# Patient Record
Sex: MALE | Race: WHITE | NOT HISPANIC OR LATINO | Employment: OTHER | URBAN - METROPOLITAN AREA
[De-identification: names, ages, dates, MRNs, and addresses within clinical notes are randomized per-mention and may not be internally consistent; named-entity substitution may affect disease eponyms.]

---

## 2019-12-30 ENCOUNTER — RECORDS - HEALTHEAST (OUTPATIENT)
Dept: LAB | Facility: CLINIC | Age: 84
End: 2019-12-30

## 2019-12-30 LAB
ALBUMIN SERPL-MCNC: 3 G/DL (ref 3.5–5)
ANION GAP SERPL CALCULATED.3IONS-SCNC: 6 MMOL/L (ref 5–18)
BUN SERPL-MCNC: 64 MG/DL (ref 8–28)
CALCIUM SERPL-MCNC: 9.3 MG/DL (ref 8.5–10.5)
CHLORIDE BLD-SCNC: 110 MMOL/L (ref 98–107)
CO2 SERPL-SCNC: 28 MMOL/L (ref 22–31)
CREAT SERPL-MCNC: 1.61 MG/DL (ref 0.7–1.3)
ERYTHROCYTE [DISTWIDTH] IN BLOOD BY AUTOMATED COUNT: 13.6 % (ref 11–14.5)
GFR SERPL CREATININE-BSD FRML MDRD: 40 ML/MIN/1.73M2
GLUCOSE BLD-MCNC: 78 MG/DL (ref 70–125)
HCT VFR BLD AUTO: 29.7 % (ref 40–54)
HGB BLD-MCNC: 9.1 G/DL (ref 14–18)
MCH RBC QN AUTO: 30.7 PG (ref 27–34)
MCHC RBC AUTO-ENTMCNC: 30.6 G/DL (ref 32–36)
MCV RBC AUTO: 100 FL (ref 80–100)
PLATELET # BLD AUTO: 192 THOU/UL (ref 140–440)
PMV BLD AUTO: 11.7 FL (ref 8.5–12.5)
POTASSIUM BLD-SCNC: 4.6 MMOL/L (ref 3.5–5)
RBC # BLD AUTO: 2.96 MILL/UL (ref 4.4–6.2)
SODIUM SERPL-SCNC: 144 MMOL/L (ref 136–145)
WBC: 6.7 THOU/UL (ref 4–11)

## 2020-01-06 ENCOUNTER — RECORDS - HEALTHEAST (OUTPATIENT)
Dept: LAB | Facility: CLINIC | Age: 85
End: 2020-01-06

## 2020-01-06 LAB
ANION GAP SERPL CALCULATED.3IONS-SCNC: 7 MMOL/L (ref 5–18)
BUN SERPL-MCNC: 62 MG/DL (ref 8–28)
CALCIUM SERPL-MCNC: 8.7 MG/DL (ref 8.5–10.5)
CHLORIDE BLD-SCNC: 109 MMOL/L (ref 98–107)
CO2 SERPL-SCNC: 25 MMOL/L (ref 22–31)
CREAT SERPL-MCNC: 1.56 MG/DL (ref 0.7–1.3)
GFR SERPL CREATININE-BSD FRML MDRD: 42 ML/MIN/1.73M2
GLUCOSE BLD-MCNC: 77 MG/DL (ref 70–125)
HGB BLD-MCNC: 9.5 G/DL (ref 14–18)
POTASSIUM BLD-SCNC: 4.5 MMOL/L (ref 3.5–5)
SODIUM SERPL-SCNC: 141 MMOL/L (ref 136–145)

## 2020-01-10 ENCOUNTER — RECORDS - HEALTHEAST (OUTPATIENT)
Dept: LAB | Facility: CLINIC | Age: 85
End: 2020-01-10

## 2020-01-13 LAB
ALBUMIN SERPL-MCNC: 3.5 G/DL (ref 3.5–5)
ANION GAP SERPL CALCULATED.3IONS-SCNC: 11 MMOL/L (ref 5–18)
BUN SERPL-MCNC: 66 MG/DL (ref 8–28)
CALCIUM SERPL-MCNC: 9.5 MG/DL (ref 8.5–10.5)
CHLORIDE BLD-SCNC: 109 MMOL/L (ref 98–107)
CO2 SERPL-SCNC: 25 MMOL/L (ref 22–31)
CREAT SERPL-MCNC: 1.71 MG/DL (ref 0.7–1.3)
ERYTHROCYTE [DISTWIDTH] IN BLOOD BY AUTOMATED COUNT: 14.2 % (ref 11–14.5)
GFR SERPL CREATININE-BSD FRML MDRD: 38 ML/MIN/1.73M2
GLUCOSE BLD-MCNC: 139 MG/DL (ref 70–125)
HCT VFR BLD AUTO: 34 % (ref 40–54)
HGB BLD-MCNC: 10.2 G/DL (ref 14–18)
MCH RBC QN AUTO: 30.5 PG (ref 27–34)
MCHC RBC AUTO-ENTMCNC: 30 G/DL (ref 32–36)
MCV RBC AUTO: 102 FL (ref 80–100)
PLATELET # BLD AUTO: 171 THOU/UL (ref 140–440)
PMV BLD AUTO: 11.7 FL (ref 8.5–12.5)
POTASSIUM BLD-SCNC: 4.5 MMOL/L (ref 3.5–5)
RBC # BLD AUTO: 3.34 MILL/UL (ref 4.4–6.2)
SODIUM SERPL-SCNC: 145 MMOL/L (ref 136–145)
WBC: 6.5 THOU/UL (ref 4–11)

## 2020-01-16 ENCOUNTER — RECORDS - HEALTHEAST (OUTPATIENT)
Dept: LAB | Facility: CLINIC | Age: 85
End: 2020-01-16

## 2020-01-16 LAB
FOLATE SERPL-MCNC: >20 NG/ML
TSH SERPL DL<=0.005 MIU/L-ACNC: 2.56 UIU/ML (ref 0.3–5)
VIT B12 SERPL-MCNC: 1043 PG/ML (ref 213–816)

## 2020-02-03 PROBLEM — E53.8 B12 DEFICIENCY: Status: ACTIVE | Noted: 2017-10-18

## 2020-02-03 PROBLEM — I25.10 CORONARY ATHEROSCLEROSIS: Status: ACTIVE | Noted: 2018-07-24

## 2020-02-03 NOTE — PROGRESS NOTES
[facility]  PRIMARY CARE PROVIDER AND CLINIC:  Trinidad Cobian, SYBIL CNP, [address] / [place] [address]  Chief Complaint   Patient presents with     Establish Care     [facility] Medical Record Number:  [id number]  Place of Service where encounter took place:  [facility] ([facility]) [[id number]]    HPI:    HPI information obtained from: facility chart records, facility staff, patient report and [facility] chart review.     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 He was previously living at home with his spouse where he had multiple falls, increased weakness and anxiety. His spouse had to call 911 to help him up and he was taken to [facility] ER for further evaluation. Work up was unremarkable and he was started on mirtazapine for anxietyattacks which he was previously on lorazepam. [facility] hospitalization 12/17-12/20/19. He then went to [facility] from 12/20-1/22/20. Mirtazapine was increased to 30 mg daily and anxiety noted to have improved. At discharge, he was ambulating 300 feet with FWW but had a fear of falling. Due to increased care needs for him and his spouse, it was recommended that they move to assisted living thus moved to the [facility].        Mr. Salvador was visited today while in his apartment, sitting in the wheelchair eating lunch. Wife is present of the visit as well, she asked that he be visited after he has eaten his lunch. Returned thirty minutes later and he was finishing up lunch but did not appear to eat much. He denies pain. States that he does not wish to go to the dining room for meals due to not wanting to ambulate or use the wheelchair. He has not walked with a walker  "for 2 weeks due to fear of falling. He becomes anxious and lower extremities become tremulous/shaky and he \"panics\" that lasts several seconds. This has happened on six different occasions since moving to the facility. He declined PT/OT services. The rubber door threads seem to trigger the attacks as the wheels on the chair have to go over the tread and he fears falling out head first. He is a \"slow\" eater, has not been eating well and has lost 12 pounds over the past several weeks, but gained some back while at the TCU. Sleeping well. Having regular bowel movements. Does not feel that he is completely emptying his bladder but states per past provider \"has a prostate of a 30 year old.\"    Spoke with daughter today who reports that she feels her dad is depressed. Described that the shades are drawn today and he appears highly anxious. Both of her parents generally kept to themselves and always had the shades drawn at their previous home which her dad has told her because the light bothers his eyes. She is concerned that her parents may not be able to live together in the future as they are irritable toward one another. She would like to see her dad use the walker as he was before. She too fears that he will fall out of his wheelchair during a panic episode. Spoke about the benefit of PT/OT which both of her parents have declined.      Functional Status at TCU Discharge  Mobility: Ambulating with a front-wheeled walker with minimal assistance up to 300 feet. Independent with transfers.   Balance: Tinetti Balance Assessment 18/28 (24-28 = low fall risk; 19-23 = medium fall risk; 0-18 = high fall risk)  ADLs: Upper Extremity Dressing: independent  Lower Extremity Dressing: independent  Toileting: independent  Hygiene and Grooming: moderate assistance with bathing  Kitchen: moderate assistance with meal preparation   Cognition: [employer] Mental Status ([facility]) 25/30 (27-30 within normal limits)    CODE " STATUS/ADVANCE DIRECTIVES DISCUSSION:   DNR / DNI  Patient's living condition: lives with spouse  ALLERGIES: Patient has no allergy information on record.  PAST MEDICAL HISTORY:  has a past medical history of CAD (coronary artery disease), Cataract, HTN (hypertension), and Nocturia.  PAST SURGICAL HISTORY:   has a past surgical history that includes cataract iol, rt/lt.  FAMILY HISTORY: family history includes Cataracts in his father.  SOCIAL HISTORY:   reports that he has never smoked. He has never used smokeless tobacco. He reports previous alcohol use.    Post Discharge Medication Reconciliation Status: discharge medications reconciled and changed, per note/orders (see AVS)    Current Outpatient Medications   Medication Sig Dispense Refill     allopurinol (ZYLOPRIM) 100 MG tablet Take 1 tablet (100 mg) by mouth daily       aspirin (ASA) 81 MG EC tablet Take 1 tablet (81 mg) by mouth daily       atorvastatin (LIPITOR) 40 MG tablet Take 1 tablet (40 mg) by mouth daily       citalopram (CELEXA) 10 MG tablet Take 1 tablet (10 mg) by mouth daily       cyanocobalamin (VITAMIN B-12) 500 MCG tablet Take 1 tablet (500 mcg) by mouth daily       losartan (COZAAR) 25 MG tablet Take 1 tablet (25 mg) by mouth daily       mirtazapine (REMERON) 15 MG tablet Take 1 tablet (15 mg) by mouth At Bedtime       nitroGLYcerin (NITROSTAT) 0.4 MG sublingual tablet For chest pain place 1 tablet under the tongue every 5 minutes for 3 doses. If symptoms persist 5 minutes after 1st dose call 911.       polyethylene glycol (MIRALAX/GLYCOLAX) packet Take 8.5 g by mouth daily as needed for constipation       triamcinolone (KENALOG) 0.1 % external ointment Apply topically daily as needed for irritation       vitamin D3 (CHOLECALCIFEROL) 2000 units (50 mcg) tablet Take 1 tablet (2,000 Units) by mouth daily       ROS:  10 point ROS of systems including Constitutional, Eyes, Respiratory, Cardiovascular, Gastroenterology, Genitourinary,  Integumentary, Musculoskeletal, Psychiatric were all negative except for pertinent positives noted in my HPI.    Vitals:  There were no vitals taken for this visit.  Exam:  GENERAL APPEARANCE:  Alert, in no distress, pleasant, cooperative, oriented x 4  EYES: no discharge or mattering on lids or lashes noted  ENT:  moist mucous membranes, hearing acuity intact  NECK: supple, symmetrical  RESP: no respiratory distress, Lung sounds clear, patient is on room air  CV:  rate and rhythm regular, no murmur. Edema none in bilateral lower extremities. VASCULAR: warm extremities without open areas.  ABDOMEN: normal bowel sounds, soft, nontender.  M/S:   Gait and station: wheelchair bound due to fear of falling, no tenderness or swelling of the joints; able to move all extremities   SKIN:  Inspection and palpation of skin and subcutaneous tissue: skin warm, dry and intact without rashes  NEURO: no facial asymmetry, no speech deficits and able to follow directions, moves all extremities symmetrically  PSYCH:  insight and judgement intact, memory intact, affect and mood flat    Lab/Diagnostic data:  Reviewed in care everywhere    ASSESSMENT/PLAN:  Uncontrolled Anxiety  Panic attacks  Due to fear of falling. Was started on mirtazapine during hospitalization which was increased to 30 mg at TCU and per the discharge summary was helpful. Since moving to the assisted living he has had 6 episodes of panic attack all while in the wheelchair and related to the fear of falling out of the wheelchair. The attacks are described as the lower legs starting to shake for several seconds. He was using a walker and ambulating up to 300 feet but he has used the wheelchair since three days prior to TCU discharge due to fear. Was previously using lorazepam which will be avoided as that likely contributed to the initial falls.   --will decrease mirtazapine to 15 mg at HS to get more sedative effect to target anxiety. If still anxious in 1-2 weeks,  will decrease dose further to 7.5 mg.   --start citalopram 10 mg daily and will increase up to 20 mg per day as tolerated.     Depression  Very flat affect. Shades drawn in the apartment but after chart review of him and spouse, there were SW visits to their previous home, the house was dark with all the shades drawn as well. He appears slightly paranoid today, doesn't want to leave apartment to meet other residents but this may also be due to uncontrolled anxiety. Hopeful that this will improve with the medication changes as noted above.   --citalopram and mirtazapine as noted above.     Chronic ischemic heart disease  Hypertension  Hyperlipidema  EF of 45%, stent placed in 1994. Previously on amlodipine but was stopped due to pedal edema and he was started on losartan this past summer 2019 which he appears to have tolerated well. Blood pressure is controlled today at 124/78 and HR 83.   --continue with losartan 25 mg daily  --asa 81 mg daily  --atorvastatin 40 mg daily  --Continue to monitor blood pressure and heart rate, adjust medications as needed.     CKD Stage 3  Baseline creatinine of 1.6-1.8. per chart review, he saw a nephrologist in 2017 but doesn't appear to have returned for the recommended 6 month follow up.   --Avoid nephrotoxic medications. Recheck BMP on next lab day.     Anemia  May be due to poor food intake. Baseline HGB appears to be around 10.  --continue with B12 supplement daily  --recheck HGB on next lab day    Weight loss  Was 125 lb which decreased to 113 while at the TCU then increased back to 118-120 lbs.   --encourage all meals and snacks.   --could see weight increase with mirtazapine.     Asthma  No wheezing on exam. He does not use an inhaler.     Gout  No s/sx of flare  --continue with allopurinol 100 mg daily    Gallstones  Hx of gallstone pancreatitis. No complaints of abdominal pain today.   --monitor for now. If develops abdominal pain, may need to have surgical intervention for  gallstones.     Total time spent with patient visit at the [facility] nursing facility was 65 minutes including patient visit, review of past records and phone call to patient contact. Greater than 50% of total time spent with counseling and coordinating care due to panic attacks, anxiety, depression, CKD III, need for follow up bloodwork.     Electronically signed by:  SYBIL Milton CNP

## 2020-02-04 ENCOUNTER — ASSISTED LIVING VISIT (OUTPATIENT)
Dept: GERIATRICS | Facility: CLINIC | Age: 85
End: 2020-02-04
Payer: MEDICARE

## 2020-02-04 DIAGNOSIS — I25.9 CHRONIC ISCHEMIC HEART DISEASE: ICD-10-CM

## 2020-02-04 DIAGNOSIS — J45.20 MILD INTERMITTENT ASTHMA WITHOUT COMPLICATION: ICD-10-CM

## 2020-02-04 DIAGNOSIS — N18.30 ANEMIA DUE TO STAGE 3 CHRONIC KIDNEY DISEASE (H): ICD-10-CM

## 2020-02-04 DIAGNOSIS — M10.9 GOUT, UNSPECIFIED CAUSE, UNSPECIFIED CHRONICITY, UNSPECIFIED SITE: ICD-10-CM

## 2020-02-04 DIAGNOSIS — R63.4 WEIGHT LOSS: ICD-10-CM

## 2020-02-04 DIAGNOSIS — F41.1 GENERALIZED ANXIETY DISORDER: ICD-10-CM

## 2020-02-04 DIAGNOSIS — I10 ESSENTIAL HYPERTENSION: ICD-10-CM

## 2020-02-04 DIAGNOSIS — F41.0 PANIC ATTACK: ICD-10-CM

## 2020-02-04 DIAGNOSIS — Z76.89 ENCOUNTER TO ESTABLISH CARE: Primary | ICD-10-CM

## 2020-02-04 DIAGNOSIS — K80.20 GALLSTONES: ICD-10-CM

## 2020-02-04 DIAGNOSIS — N18.30 CKD (CHRONIC KIDNEY DISEASE) STAGE 3, GFR 30-59 ML/MIN (H): ICD-10-CM

## 2020-02-04 DIAGNOSIS — E78.2 MIXED HYPERLIPIDEMIA: ICD-10-CM

## 2020-02-04 DIAGNOSIS — F33.1 MODERATE EPISODE OF RECURRENT MAJOR DEPRESSIVE DISORDER (H): ICD-10-CM

## 2020-02-04 DIAGNOSIS — D63.1 ANEMIA DUE TO STAGE 3 CHRONIC KIDNEY DISEASE (H): ICD-10-CM

## 2020-02-04 PROCEDURE — 99207 ZZC CDG-CODE INCORRECT PER BILLING BASED ON TIME: CPT | Performed by: NURSE PRACTITIONER

## 2020-02-04 NOTE — LETTER
2/4/2020        RE: Ben Salvador  [facility]  [address] [address] [place] [address]        [facility]  PRIMARY CARE PROVIDER AND CLINIC:  SYBIL Milton CNP, [address] [place] [address] / [place] [address]  Chief Complaint   Patient presents with     Establish Care     [facility] Medical Record Number:  [id number]  Place of Service where encounter took place:  [facility] ([facility]) [[id number]]    HPI:    HPI information obtained from: facility chart records, facility staff, patient report and [facility] chart review.     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 He was previously living at home with his spouse where he had multiple falls, increased weakness and anxiety. His spouse had to call 911 to help him up and he was taken to [facility] for further evaluation. Work up was unremarkable and he was started on mirtazapine for anxietyattacks which he was previously on lorazepam. [facility] hospitalization 12/17-12/20/19. He then went to [facility] from 12/20-1/22/20. Mirtazapine was increased to 30 mg daily and anxiety noted to have improved. At discharge, he was ambulating 300 feet with FWW but had a fear of falling. Due to increased care needs for him and his spouse, it was recommended that they move to assisted living thus moved to the [facility].        Mr. Salvador was visited today while in his apartment, sitting in the wheelchair eating lunch. Wife is present of the visit as well, she asked that he be visited after he has eaten his lunch. Returned thirty minutes later and he was finishing up lunch but did not appear to eat much. He denies pain. States that he does not wish to go to the  "dining room for meals due to not wanting to ambulate or use the wheelchair. He has not walked with a walker for 2 weeks due to fear of falling. He becomes anxious and lower extremities become tremulous/shaky and he \"panics\" that lasts several seconds. This has happened on six different occasions since moving to the facility. He declined PT/OT services. The rubber door threads seem to trigger the attacks as the wheels on the chair have to go over the tread and he fears falling out head first. He is a \"slow\" eater, has not been eating well and has lost 12 pounds over the past several weeks, but gained some back while at the TCU. Sleeping well. Having regular bowel movements. Does not feel that he is completely emptying his bladder but states per past provider \"has a prostate of a 30 year old.\"    Spoke with daughter today who reports that she feels her dad is depressed. Described that the shades are drawn today and he appears highly anxious. Both of her parents generally kept to themselves and always had the shades drawn at their previous home which her dad has told her because the light bothers his eyes. She is concerned that her parents may not be able to live together in the future as they are irritable toward one another. She would like to see her dad use the walker as he was before. She too fears that he will fall out of his wheelchair during a panic episode. Spoke about the benefit of PT/OT which both of her parents have declined.      Functional Status at TCU Discharge  Mobility: Ambulating with a front-wheeled walker with minimal assistance up to 300 feet. Independent with transfers.   Balance: Tinetti Balance Assessment 18/28 (24-28 = low fall risk; 19-23 = medium fall risk; 0-18 = high fall risk)  ADLs: Upper Extremity Dressing: independent  Lower Extremity Dressing: independent  Toileting: independent  Hygiene and Grooming: moderate assistance with bathing  Kitchen: moderate assistance with meal preparation "   Cognition: [employer] Mental Status ([facility]) 25/30 (27-30 within normal limits)    CODE STATUS/ADVANCE DIRECTIVES DISCUSSION:   DNR / DNI  Patient's living condition: lives with spouse  ALLERGIES: Patient has no allergy information on record.  PAST MEDICAL HISTORY:  has a past medical history of CAD (coronary artery disease), Cataract, HTN (hypertension), and Nocturia.  PAST SURGICAL HISTORY:   has a past surgical history that includes cataract iol, rt/lt.  FAMILY HISTORY: family history includes Cataracts in his father.  SOCIAL HISTORY:   reports that he has never smoked. He has never used smokeless tobacco. He reports previous alcohol use.    Post Discharge Medication Reconciliation Status: discharge medications reconciled and changed, per note/orders (see AVS)    Current Outpatient Medications   Medication Sig Dispense Refill     allopurinol (ZYLOPRIM) 100 MG tablet Take 1 tablet (100 mg) by mouth daily       aspirin (ASA) 81 MG EC tablet Take 1 tablet (81 mg) by mouth daily       atorvastatin (LIPITOR) 40 MG tablet Take 1 tablet (40 mg) by mouth daily       citalopram (CELEXA) 10 MG tablet Take 1 tablet (10 mg) by mouth daily       cyanocobalamin (VITAMIN B-12) 500 MCG tablet Take 1 tablet (500 mcg) by mouth daily       losartan (COZAAR) 25 MG tablet Take 1 tablet (25 mg) by mouth daily       mirtazapine (REMERON) 15 MG tablet Take 1 tablet (15 mg) by mouth At Bedtime       nitroGLYcerin (NITROSTAT) 0.4 MG sublingual tablet For chest pain place 1 tablet under the tongue every 5 minutes for 3 doses. If symptoms persist 5 minutes after 1st dose call 911.       polyethylene glycol (MIRALAX/GLYCOLAX) packet Take 8.5 g by mouth daily as needed for constipation       triamcinolone (KENALOG) 0.1 % external ointment Apply topically daily as needed for irritation       vitamin D3 (CHOLECALCIFEROL) 2000 units (50 mcg) tablet Take 1 tablet (2,000 Units) by mouth daily       ROS:  10 point ROS of systems  including Constitutional, Eyes, Respiratory, Cardiovascular, Gastroenterology, Genitourinary, Integumentary, Musculoskeletal, Psychiatric were all negative except for pertinent positives noted in my HPI.    Vitals:  There were no vitals taken for this visit.  Exam:  GENERAL APPEARANCE:  Alert, in no distress, pleasant, cooperative, oriented x 4  EYES: no discharge or mattering on lids or lashes noted  ENT:  moist mucous membranes, hearing acuity intact  NECK: supple, symmetrical  RESP: no respiratory distress, Lung sounds clear, patient is on room air  CV:  rate and rhythm regular, no murmur. Edema none in bilateral lower extremities. VASCULAR: warm extremities without open areas.  ABDOMEN: normal bowel sounds, soft, nontender.  M/S:   Gait and station: wheelchair bound due to fear of falling, no tenderness or swelling of the joints; able to move all extremities   SKIN:  Inspection and palpation of skin and subcutaneous tissue: skin warm, dry and intact without rashes  NEURO: no facial asymmetry, no speech deficits and able to follow directions, moves all extremities symmetrically  PSYCH:  insight and judgement intact, memory intact, affect and mood flat    Lab/Diagnostic data:  Reviewed in care everywhere    ASSESSMENT/PLAN:  Uncontrolled Anxiety  Panic attacks  Due to fear of falling. Was started on mirtazapine during hospitalization which was increased to 30 mg at TCU and per the discharge summary was helpful. Since moving to the assisted living he has had 6 episodes of panic attack all while in the wheelchair and related to the fear of falling out of the wheelchair. The attacks are described as the lower legs starting to shake for several seconds. He was using a walker and ambulating up to 300 feet but he has used the wheelchair since three days prior to TCU discharge due to fear. Was previously using lorazepam which will be avoided as that likely contributed to the initial falls.   --will decrease mirtazapine  to 15 mg at HS to get more sedative effect to target anxiety. If still anxious in 1-2 weeks, will decrease dose further to 7.5 mg.   --start citalopram 10 mg daily and will increase up to 20 mg per day as tolerated.     Depression  Very flat affect. Shades drawn in the apartment but after chart review of him and spouse, there were SW visits to their previous home, the house was dark with all the shades drawn as well. He appears slightly paranoid today, doesn't want to leave apartment to meet other residents but this may also be due to uncontrolled anxiety. Hopeful that this will improve with the medication changes as noted above.   --citalopram and mirtazapine as noted above.     Chronic ischemic heart disease  Hypertension  Hyperlipidema  EF of 45%, stent placed in 1994. Previously on amlodipine but was stopped due to pedal edema and he was started on losartan this past summer 2019 which he appears to have tolerated well. Blood pressure is controlled today at 124/78 and HR 83.   --continue with losartan 25 mg daily  --asa 81 mg daily  --atorvastatin 40 mg daily  --Continue to monitor blood pressure and heart rate, adjust medications as needed.     CKD Stage 3  Baseline creatinine of 1.6-1.8. per chart review, he saw a nephrologist in 2017 but doesn't appear to have returned for the recommended 6 month follow up.   --Avoid nephrotoxic medications. Recheck BMP on next lab day.     Anemia  May be due to poor food intake. Baseline HGB appears to be around 10.  --continue with B12 supplement daily  --recheck HGB on next lab day    Weight loss  Was 125 lb which decreased to 113 while at the TCU then increased back to 118-120 lbs.   --encourage all meals and snacks.   --could see weight increase with mirtazapine.     Asthma  No wheezing on exam. He does not use an inhaler.     Gout  No s/sx of flare  --continue with allopurinol 100 mg daily    Gallstones  Hx of gallstone pancreatitis. No complaints of abdominal pain  today.   --monitor for now. If develops abdominal pain, may need to have surgical intervention for gallstones.     Total time spent with patient visit at the skilled nursing facility was 65 minutes including patient visit, review of past records and phone call to patient contact. Greater than 50% of total time spent with counseling and coordinating care due to panic attacks, anxiety, depression, CKD III, need for follow up bloodwork.     Electronically signed by:  SYBIL Milton CNP                       Sincerely,        SYBIL Milton CNP

## 2020-02-06 RX ORDER — ATORVASTATIN CALCIUM 40 MG/1
40 TABLET, FILM COATED ORAL DAILY
COMMUNITY
Start: 2020-02-05 | End: 2020-02-16

## 2020-02-06 RX ORDER — LOSARTAN POTASSIUM 25 MG/1
25 TABLET ORAL DAILY
COMMUNITY
Start: 2020-02-05 | End: 2020-02-16

## 2020-02-06 RX ORDER — ALLOPURINOL 100 MG/1
100 TABLET ORAL DAILY
COMMUNITY
Start: 2020-02-05 | End: 2020-02-16

## 2020-02-06 RX ORDER — UREA 10 %
500 LOTION (ML) TOPICAL DAILY
COMMUNITY
Start: 2020-02-05 | End: 2020-02-16

## 2020-02-06 RX ORDER — CHOLECALCIFEROL (VITAMIN D3) 50 MCG
1 TABLET ORAL DAILY
COMMUNITY
Start: 2020-02-05 | End: 2020-03-03

## 2020-02-06 RX ORDER — NITROGLYCERIN 0.4 MG/1
TABLET SUBLINGUAL
COMMUNITY
Start: 2020-02-05 | End: 2021-01-28

## 2020-02-06 RX ORDER — CITALOPRAM HYDROBROMIDE 10 MG/1
10 TABLET ORAL DAILY
COMMUNITY
Start: 2020-02-05 | End: 2020-02-16

## 2020-02-06 RX ORDER — POLYETHYLENE GLYCOL 3350 17 G/17G
8.5 POWDER, FOR SOLUTION ORAL DAILY PRN
COMMUNITY
Start: 2020-02-05 | End: 2021-09-23

## 2020-02-06 RX ORDER — TRIAMCINOLONE ACETONIDE 1 MG/G
OINTMENT TOPICAL DAILY PRN
COMMUNITY
Start: 2020-02-05 | End: 2021-01-08

## 2020-02-06 RX ORDER — MIRTAZAPINE 15 MG/1
15 TABLET, FILM COATED ORAL AT BEDTIME
COMMUNITY
Start: 2020-02-05 | End: 2020-02-16

## 2020-02-14 DIAGNOSIS — I10 ESSENTIAL HYPERTENSION: ICD-10-CM

## 2020-02-14 DIAGNOSIS — R63.4 WEIGHT LOSS: ICD-10-CM

## 2020-02-14 DIAGNOSIS — F41.1 GENERALIZED ANXIETY DISORDER: Primary | ICD-10-CM

## 2020-02-14 DIAGNOSIS — M10.9 GOUT, UNSPECIFIED CAUSE, UNSPECIFIED CHRONICITY, UNSPECIFIED SITE: ICD-10-CM

## 2020-02-16 RX ORDER — ALLOPURINOL 100 MG/1
TABLET ORAL
Qty: 30 TABLET | Status: SHIPPED | OUTPATIENT
Start: 2020-02-16 | End: 2021-03-10

## 2020-02-16 RX ORDER — ATORVASTATIN CALCIUM 40 MG/1
TABLET, FILM COATED ORAL
Qty: 30 TABLET | Status: SHIPPED | OUTPATIENT
Start: 2020-02-16 | End: 2021-03-10

## 2020-02-16 RX ORDER — LOSARTAN POTASSIUM 25 MG/1
TABLET ORAL
Qty: 30 TABLET | Status: SHIPPED | OUTPATIENT
Start: 2020-02-16 | End: 2020-10-21

## 2020-02-16 RX ORDER — CITALOPRAM HYDROBROMIDE 10 MG/1
TABLET ORAL
Qty: 30 TABLET | Status: SHIPPED | OUTPATIENT
Start: 2020-02-16 | End: 2020-03-14

## 2020-02-16 RX ORDER — MIRTAZAPINE 15 MG/1
TABLET, FILM COATED ORAL
Qty: 30 TABLET | Status: SHIPPED | OUTPATIENT
Start: 2020-02-16 | End: 2020-04-02

## 2020-02-16 RX ORDER — MULTIVIT-MIN/FA/LYCOPEN/LUTEIN .4-300-25
TABLET ORAL
Qty: 30 TABLET | Status: SHIPPED | OUTPATIENT
Start: 2020-02-16 | End: 2021-03-10

## 2020-02-16 RX ORDER — CHOLECALCIFEROL (VITAMIN D3) 25 MCG
TABLET ORAL
Qty: 30 TABLET | Status: SHIPPED | OUTPATIENT
Start: 2020-02-16 | End: 2021-03-10

## 2020-02-16 RX ORDER — ASPIRIN 81 MG
TABLET,CHEWABLE ORAL
Qty: 30 TABLET | Status: SHIPPED | OUTPATIENT
Start: 2020-02-16 | End: 2021-03-10

## 2020-02-16 RX ORDER — UREA 10 %
LOTION (ML) TOPICAL
Qty: 30 TABLET | Status: SHIPPED | OUTPATIENT
Start: 2020-02-16 | End: 2021-02-05

## 2020-02-18 ENCOUNTER — ASSISTED LIVING VISIT (OUTPATIENT)
Dept: GERIATRICS | Facility: CLINIC | Age: 85
End: 2020-02-18
Payer: MEDICARE

## 2020-02-18 DIAGNOSIS — D51.9 ANEMIA DUE TO VITAMIN B12 DEFICIENCY, UNSPECIFIED B12 DEFICIENCY TYPE: ICD-10-CM

## 2020-02-18 DIAGNOSIS — M62.81 GENERALIZED MUSCLE WEAKNESS: ICD-10-CM

## 2020-02-18 DIAGNOSIS — R63.4 WEIGHT LOSS: ICD-10-CM

## 2020-02-18 DIAGNOSIS — I10 ESSENTIAL HYPERTENSION: ICD-10-CM

## 2020-02-18 DIAGNOSIS — I25.9 CHRONIC ISCHEMIC HEART DISEASE: ICD-10-CM

## 2020-02-18 DIAGNOSIS — R29.6 FALLS FREQUENTLY: ICD-10-CM

## 2020-02-18 DIAGNOSIS — N18.30 CKD (CHRONIC KIDNEY DISEASE) STAGE 3, GFR 30-59 ML/MIN (H): ICD-10-CM

## 2020-02-18 DIAGNOSIS — F33.1 MODERATE EPISODE OF RECURRENT MAJOR DEPRESSIVE DISORDER (H): ICD-10-CM

## 2020-02-18 DIAGNOSIS — F41.1 GENERALIZED ANXIETY DISORDER: Primary | ICD-10-CM

## 2020-02-18 DIAGNOSIS — F41.0 PANIC ATTACK: ICD-10-CM

## 2020-02-18 NOTE — LETTER
"    2/18/2020        RE: Ben Salvador  [facility]  [address]  [facility] [address]        Ben Salvador is a 89 year old male seen February 18, 2020 at [facility] where he was admitted this month from home.   Pt is seen in his apartment, up to  with his wife present.    His biggest concern is \"I still have anxiety and panic attacks\"  States during evening cares this week the MARIA GUADALUPE was rushing him while despite his attempts to slow the pace, and he developed uncontrolled shaking of his feet and lower extremities that lasted about 20 seconds. This is typical for his panic attacks.      By chart review, patient had a [facility] admission in December 2019 for recurrent falls and worsened anxiety.   He had been taking lorazepam at home for the anxiety, worsening the falls, and this was tapered off.  He transitioned to [facility] where his anxiety, panic attacks and fear of falling impaired his ability to progress with therapies.    Mirtazapine started and he was followed by the Psychologist.  By the end of his month-long stay he was able to ambulate 300' with FWW and min assist, independent with transfers.   Since AL admission he has not tried walking and has declined [facility] PHYSICAL THERAPY / OCCUPATIONAL THERAPY, retains disabling fear of falling.             Pt has been primary caregiver for his wife aMriela, but not eating well, losing weight and getting weaker.   After hospitalization their family determined they need more support and have moved them to [place] for permanent placement.       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  They have a son Torin and daughter Dixie.     Non " smoker    ROS:  Tinetti 18/28    SLUMS 25/30  Continued poor appetite and weight loss; weight 113-120 lbs.    Keeps shades drawn, does not go out of apartment.     EXAM: up to WC, NAD  BP (!) 144/78   Pulse 77   Temp 96.8  F (36  C)   SpO2 98%    Neck supple without adenopathy  Lungs clear bilaterally with fair air movement  Heart RRR s1s2  Abd soft, NT, no distention, +BS  Ext without edema  Neuro: accurate historian, LE weakness  Psych: affect and content depressed    LAB 1/7-1/16/20:  TSH 2.56  B12 1043   Folate >20  Albumin 3.5  hgb 10.2  , plts 171  Na 145, K 4.5  CO2 25 glucose 77 BUN/Cr 62/1.56  GFR 42      IMP/PLAN:   (F41.1) Generalized anxiety disorder   (F41.0) Panic attack  (F33.1) Moderate episode of recurrent major depressive disorder (H)  Comment: medications adjusted just earlier this week, so will not change again today.   Plan: mirtazapine 15 mg/HS and citalopram 10 mg/day     He has made attempts to control his environment and prevent panic, and he will talk with DON about MARIA GUADALUPE concerns    (I10) Essential hypertension  (N18.3) CKD (chronic kidney disease) stage 3, GFR 30-59 ml/min (H)  Comment: GFR 42, bp control is okay.     Plan: losartan 25 mg/day, follow bps and BMP       (I25.9) Chronic ischemic heart disease  Comment: h/o MI, last EF 45% with inferior and inferolateral hypokinesis  Plan: remains on daily ASA and statin for secondary prevention.        (R63.4) Weight loss  Comment: poor appetite  Plan: some improvement in AL, continue mirtazapine with hopes to boost appetite.   Would be helpful if he and Mariela would go to DR for meals, but they have declined to date.       (R29.6) Falls frequently  (M62.81) Generalized muscle weakness  Comment: currently only using WC and needs assist for all ADLs     Plan: talked with patient about reconsidering [facility] PHYSICAL THERAPY   He is still not interested.      Continue vit D, dietary calcium       (D51.9) Anemia due to vitamin B12  deficiency, unspecified B12 deficiency type  Comment: macrocytic anemia   Plan: continue B12 replacement        Yessenia Coffman MD       Sincerely,        Yessenia Coffman MD

## 2020-02-20 ENCOUNTER — DOCUMENTATION ONLY (OUTPATIENT)
Dept: OTHER | Facility: CLINIC | Age: 85
End: 2020-02-20

## 2020-02-22 VITALS
TEMPERATURE: 96.8 F | SYSTOLIC BLOOD PRESSURE: 144 MMHG | DIASTOLIC BLOOD PRESSURE: 78 MMHG | OXYGEN SATURATION: 98 % | HEART RATE: 77 BPM

## 2020-02-22 NOTE — PROGRESS NOTES
"Ben Salvador is a 89 year old male seen February 18, 2020 at [facility] where he was admitted this month from home.   Pt is seen in his apartment, up to  with his wife present.    His biggest concern is \"I still have anxiety and panic attacks\"  States during evening cares this week the MARIA GUADALUPE was rushing him while despite his attempts to slow the pace, and he developed uncontrolled shaking of his feet and lower extremities that lasted about 20 seconds. This is typical for his panic attacks.      By chart review, patient had a [facility] admission in December 2019 for recurrent falls and worsened anxiety.   He had been taking lorazepam at home for the anxiety, worsening the falls, and this was tapered off.  He transitioned to [facility] where his anxiety, panic attacks and fear of falling impaired his ability to progress with therapies.    Mirtazapine started and he was followed by the Psychologist.  By the end of his month-long stay he was able to ambulate 300' with FWW and min assist, independent with transfers.   Since AL admission he has not tried walking and has declined [facility] PHYSICAL THERAPY / OCCUPATIONAL THERAPY, retains disabling fear of falling.             Pt has been primary caregiver for his wife Mariela, but not eating well, losing weight and getting weaker.   After hospitalization their family determined they need more support and have moved them to AL for permanent placement.       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  They have a son Torin and daughter Dixie.     Non smoker    ROS:  Tinetti 18/28    SLUMS 25/30  Continued poor appetite and weight loss; weight 113-120 lbs.    Keeps " shades drawn, does not go out of apartment.     EXAM: up to WC, NAD  BP (!) 144/78   Pulse 77   Temp 96.8  F (36  C)   SpO2 98%    Neck supple without adenopathy  Lungs clear bilaterally with fair air movement  Heart RRR s1s2  Abd soft, NT, no distention, +BS  Ext without edema  Neuro: accurate historian, LE mor  Psych: affect and content depressed    LAB 1/7-1/16/20:  TSH 2.56  B12 1043   Folate >20  Albumin 3.5  hgb 10.2  , plts 171  Na 145, K 4.5  CO2 25 glucose 77 BUN/Cr 62/1.56  GFR 42      IMP/PLAN:   (F41.1) Generalized anxiety disorder   (F41.0) Panic attack  (F33.1) Moderate episode of recurrent major depressive disorder (H)  Comment: medications adjusted just earlier this week, so will not change again today.   Plan: mirtazapine 15 mg/HS and citalopram 10 mg/day     He has made attempts to control his environment and prevent panic, and he will talk with DON about MARIA GUADALUPE concerns    (I10) Essential hypertension  (N18.3) CKD (chronic kidney disease) stage 3, GFR 30-59 ml/min (H)  Comment: GFR 42, bp control is okay.     Plan: losartan 25 mg/day, follow bps and BMP       (I25.9) Chronic ischemic heart disease  Comment: h/o MI, last EF 45% with inferior and inferolateral hypokinesis  Plan: remains on daily ASA and statin for secondary prevention.        (R63.4) Weight loss  Comment: poor appetite  Plan: some improvement in AL, continue mirtazapine with hopes to boost appetite.   Would be helpful if he and Mariela would go to DR for meals, but they have declined to date.       (R29.6) Falls frequently  (M62.81) Generalized muscle weakness  Comment: currently only using WC and needs assist for all ADLs     Plan: talked with patient about reconsidering [facility] PHYSICAL THERAPY   He is still not interested.      Continue vit D, dietary calcium       (D51.9) Anemia due to vitamin B12 deficiency, unspecified B12 deficiency type  Comment: macrocytic anemia   Plan: continue B12 replacement        Yessenia Coffman  MD

## 2020-03-03 ENCOUNTER — ASSISTED LIVING VISIT (OUTPATIENT)
Dept: GERIATRICS | Facility: CLINIC | Age: 85
End: 2020-03-03
Payer: MEDICARE

## 2020-03-03 DIAGNOSIS — F41.1 GENERALIZED ANXIETY DISORDER: Primary | ICD-10-CM

## 2020-03-03 NOTE — LETTER
3/3/2020        RE: Ben Salvador  [facility]  [address] [facility] [address]        [place] [facility]  [place] Medical Record Number:  [id number]  Place of Service where encounter took place:  [facility] ([facility]) [[id number]]  Chief Complaint   Patient presents with     RECHECK       HPI:    Ben Salvador  is a 89 year old (1/14/1928), who is being seen today for an episodic care visit.  HPI information obtained from: facility chart records, facility staff and patient report     Mr. Salvador was visited today while in his room, sitting in the wheelchair. Reports that he has not had improvement in his anxiety. He is sleeping well at night. He has not been out of his room since he moved to the facility 6 weeks ago. He had loose stools 2 weeks ago for 2-3 days but that has since resolved. Currently receives all meals in his room.       Past Medical and Surgical History reviewed in Epic today.    MEDICATIONS:  Current Outpatient Medications   Medication Sig Dispense Refill     allopurinol (ZYLOPRIM) 100 MG tablet TAKE 1 TABLET BY MOUTH ONCE DAILY 30 tablet PRN     ASPIRIN LOW DOSE 81 MG chewable tablet CHEW AND SWALLOW ONE TABLET BY MOUTH ONCE DAILY 30 tablet PRN     atorvastatin (LIPITOR) 40 MG tablet TAKE 1 TABLET BY MOUTH ONCE DAILY 30 tablet PRN     citalopram (CELEXA) 10 MG tablet TAKE 1 TABLET BY MOUTH ONCE DAILY (DX: ANXIETY) 30 tablet PRN     cyanocobalamin (VITAMIN B-12) 500 MCG tablet TAKE 1 TABLET BY MOUTH ONCE DAILY 30 tablet PRN     losartan (COZAAR) 25 MG tablet TAKE 1 TABLET BY MOUTH ONCE DAILY 30 tablet PRN     mirtazapine (REMERON) 15 MG tablet TAKE 1 TABLET BY MOUTH AT BEDTIME (DX: ANXIETY) 30 tablet PRN     Multiple Vitamins-Minerals (CEROVITE SENIOR) TABS TAKE 1 TABLET BY MOUTH ONCE DAILY 30 tablet PRN     nitroGLYcerin (NITROSTAT) 0.4 MG sublingual tablet For chest pain place 1 tablet under the tongue every 5 minutes for 3 doses. If symptoms persist 5 minutes  after 1st dose call 911.       polyethylene glycol (MIRALAX/GLYCOLAX) packet Take 8.5 g by mouth daily as needed for constipation       triamcinolone (KENALOG) 0.1 % external ointment Apply topically daily as needed for irritation       VITAMIN D3 25 MCG (1000 UT) tablet TAKE TWO TABLETS (2000 UNITS) BY MOUTH ONCE DAILY NOTE DOSAGE/STRENGTH 30 tablet PRN     vitamin D3 (CHOLECALCIFEROL) 2000 units (50 mcg) tablet Take 1 tablet (2,000 Units) by mouth daily       REVIEW OF SYSTEMS:  4 point ROS including Respiratory, CV, GI and , other than that noted in the HPI,  is negative    Objective:  Exam:  GENERAL APPEARANCE:  Alert, in no distress, pleasant, cooperative, oriented x 4  EYES: no discharge or mattering on lids or lashes noted  ENT:  moist mucous membranes, hearing acuity intact  NECK: supple, symmetrical  RESP: no respiratory distress,  patient is on room air  CV:   Edema none in bilateral lower extremities.  M/S:   Gait and station: wheelchair bound due to fear of falling, no tenderness or swelling of the joints; able to move all extremities   NEURO: no facial asymmetry, no speech deficits and able to follow directions, moves all extremities symmetrically  PSYCH:  insight and judgement intact, memory intact, affect and mood flat/anxious    Labs:   Reviewed in care everywhere    ASSESSMENT/PLAN:  (F41.1) Generalized anxiety disorder  (primary encounter diagnosis)  Comment: does not feel his anxiety has improved although he appears more comfortable. He has not been out of his apartment since moving into the facility so there may be some underlying paranoia.   Plan: increase citalopram to 15 mg daily. Will increase to goal of 20 mg daily.   --continue with mirtazapine 15 mg at HS. Could consider decreasing to 7.5 mg at HS for a more sedative effect if needed.     Electronically signed by:  SYBIL Milton CNP               Sincerely,        SYBIL Milton CNP

## 2020-03-03 NOTE — PROGRESS NOTES
[place] [facility]  [place] Medical Record Number:  [id number]  Place of Service where encounter took place:  [facility] ([facility]) [[id number]]  Chief Complaint   Patient presents with     RECHECK       HPI:    Ben Salvador  is a 89 year old (1/14/1928), who is being seen today for an episodic care visit.  HPI information obtained from: facility chart records, facility staff and patient report     Mr. Salvador was visited today while in his room, sitting in the wheelchair. Reports that he has not had improvement in his anxiety. He is sleeping well at night. He has not been out of his room since he moved to the facility 6 weeks ago. He had loose stools 2 weeks ago for 2-3 days but that has since resolved. Currently receives all meals in his room.       Past Medical and Surgical History reviewed in Epic today.    MEDICATIONS:  Current Outpatient Medications   Medication Sig Dispense Refill     allopurinol (ZYLOPRIM) 100 MG tablet TAKE 1 TABLET BY MOUTH ONCE DAILY 30 tablet PRN     ASPIRIN LOW DOSE 81 MG chewable tablet CHEW AND SWALLOW ONE TABLET BY MOUTH ONCE DAILY 30 tablet PRN     atorvastatin (LIPITOR) 40 MG tablet TAKE 1 TABLET BY MOUTH ONCE DAILY 30 tablet PRN     citalopram (CELEXA) 10 MG tablet TAKE 1 TABLET BY MOUTH ONCE DAILY (DX: ANXIETY) 30 tablet PRN     cyanocobalamin (VITAMIN B-12) 500 MCG tablet TAKE 1 TABLET BY MOUTH ONCE DAILY 30 tablet PRN     losartan (COZAAR) 25 MG tablet TAKE 1 TABLET BY MOUTH ONCE DAILY 30 tablet PRN     mirtazapine (REMERON) 15 MG tablet TAKE 1 TABLET BY MOUTH AT BEDTIME (DX: ANXIETY) 30 tablet PRN     Multiple Vitamins-Minerals (CEROVITE SENIOR) TABS TAKE 1 TABLET BY MOUTH ONCE DAILY 30 tablet PRN     nitroGLYcerin (NITROSTAT) 0.4 MG sublingual tablet For chest pain place 1 tablet under the tongue every 5 minutes for 3 doses. If symptoms persist 5 minutes after 1st dose call 911.       polyethylene glycol (MIRALAX/GLYCOLAX) packet Take 8.5 g by mouth daily as  needed for constipation       triamcinolone (KENALOG) 0.1 % external ointment Apply topically daily as needed for irritation       VITAMIN D3 25 MCG (1000 UT) tablet TAKE TWO TABLETS (2000 UNITS) BY MOUTH ONCE DAILY NOTE DOSAGE/STRENGTH 30 tablet PRN     vitamin D3 (CHOLECALCIFEROL) 2000 units (50 mcg) tablet Take 1 tablet (2,000 Units) by mouth daily       REVIEW OF SYSTEMS:  4 point ROS including Respiratory, CV, GI and , other than that noted in the HPI,  is negative    Objective:  Exam:  GENERAL APPEARANCE:  Alert, in no distress, pleasant, cooperative, oriented x 4  EYES: no discharge or mattering on lids or lashes noted  ENT:  moist mucous membranes, hearing acuity intact  NECK: supple, symmetrical  RESP: no respiratory distress,  patient is on room air  CV:   Edema none in bilateral lower extremities.  M/S:   Gait and station: wheelchair bound due to fear of falling, no tenderness or swelling of the joints; able to move all extremities   NEURO: no facial asymmetry, no speech deficits and able to follow directions, moves all extremities symmetrically  PSYCH:  insight and judgement intact, memory intact, affect and mood flat/anxious    Labs:   Reviewed in care everywhere    ASSESSMENT/PLAN:  (F41.1) Generalized anxiety disorder  (primary encounter diagnosis)  Comment: does not feel his anxiety has improved although he appears more comfortable. He has not been out of his apartment since moving into the facility so there may be some underlying paranoia.   Plan: increase citalopram to 15 mg daily. Will increase to goal of 20 mg daily.   --continue with mirtazapine 15 mg at HS. Could consider decreasing to 7.5 mg at HS for a more sedative effect if needed.     Electronically signed by:  SYBIL Milton CNP

## 2020-03-05 ENCOUNTER — RECORDS - HEALTHEAST (OUTPATIENT)
Dept: LAB | Facility: CLINIC | Age: 85
End: 2020-03-05

## 2020-03-05 ENCOUNTER — TRANSFERRED RECORDS (OUTPATIENT)
Dept: HEALTH INFORMATION MANAGEMENT | Facility: CLINIC | Age: 85
End: 2020-03-05

## 2020-03-05 LAB
ANION GAP SERPL CALCULATED.3IONS-SCNC: 7 MMOL/L (ref 5–18)
ANION GAP SERPL CALCULATED.3IONS-SCNC: 7 MMOL/L (ref 5–18)
BUN SERPL-MCNC: 47 MG/DL (ref 8–28)
BUN SERPL-MCNC: 47 MG/DL (ref 8–28)
CALCIUM SERPL-MCNC: 9.6 MG/DL (ref 8.5–10.5)
CALCIUM SERPL-MCNC: 9.6 MG/DL (ref 8.5–10.5)
CHLORIDE BLD-SCNC: 107 MMOL/L (ref 98–107)
CHLORIDE SERPLBLD-SCNC: 107 MMOL/L (ref 98–107)
CO2 SERPL-SCNC: 28 MMOL/L (ref 22–31)
CO2 SERPL-SCNC: 28 MMOL/L (ref 22–31)
CREAT SERPL-MCNC: 1.8 MG/DL (ref 0.7–1.3)
CREAT SERPL-MCNC: 1.8 MG/DL (ref 0.7–1.3)
GFR SERPL CREATININE-BSD FRML MDRD: 35 ML/MIN/1.73M2
GFR SERPL CREATININE-BSD FRML MDRD: 35 ML/MIN/1.73M2
GLUCOSE BLD-MCNC: 82 MG/DL (ref 70–125)
GLUCOSE SERPL-MCNC: 82 MG/DL (ref 70–125)
HEMOGLOBIN: 10 G/DL (ref 14–18)
HGB BLD-MCNC: 10 G/DL (ref 14–18)
POTASSIUM BLD-SCNC: 4.5 MMOL/L (ref 3.5–5)
POTASSIUM SERPL-SCNC: 4.5 MMOL/L (ref 3.5–5)
SODIUM SERPL-SCNC: 142 MMOL/L (ref 136–145)
SODIUM SERPL-SCNC: 142 MMOL/L (ref 136–145)

## 2020-03-06 LAB — 25(OH)D3 SERPL-MCNC: 46.1 NG/ML (ref 30–80)

## 2020-03-13 DIAGNOSIS — F41.1 GENERALIZED ANXIETY DISORDER: ICD-10-CM

## 2020-03-14 RX ORDER — CITALOPRAM HYDROBROMIDE 10 MG/1
TABLET ORAL
Qty: 45 TABLET | Status: SHIPPED | OUTPATIENT
Start: 2020-03-14 | End: 2020-04-02

## 2020-03-17 ENCOUNTER — TELEPHONE (OUTPATIENT)
Dept: GERIATRICS | Facility: CLINIC | Age: 85
End: 2020-03-17

## 2020-03-17 NOTE — TELEPHONE ENCOUNTER
Prior Authorization Retail Medication Request    Medication/Dose: PRIOR AUTH REQUEST - HYDROXYZINE 25MG TAB  ICD code (if different than what is on RX):  N/A  Previously Tried and Failed:  N/A  Rationale:  N/A    Insurance Name:  ADVANCE PCS  Insurance ID:  [id number]  Insurance Tele#: [phone]    GRP - [id number]  PCN - [id number]  BIN - [id number]     Pharmacy Information (if different than what is on RX)  Name:  [facility]  Phone:  [phone]

## 2020-03-17 NOTE — TELEPHONE ENCOUNTER
PA Initiation    Medication: PRIOR AUTH REQUEST - HYDROXYZINE 25MG TAB  Insurance Company: [employer] - Phone [phone] Fax [phone]  Pharmacy Filling the Rx: [facility] - [address]  Filling Pharmacy Phone: [phone]  Filling Pharmacy Fax:    Start Date: 3/17/2020    12.5 mg bid prn for only 7 days ONLY Per Nicole    ANXIETY Per Nicole

## 2020-03-18 NOTE — TELEPHONE ENCOUNTER
Prior Authorization Approval    Authorization Effective Date: 12/18/2019  Authorization Expiration Date: 3/17/2021  Medication: PRIOR AUTH REQUEST - HYDROXYZINE 25MG TAB - APPROVED  Approved Dose/Quantity:   Reference #:     Insurance Company: [employer] - Phone [phone] Fax [phone]  Expected CoPay:       CoPay Card Available: No    Foundation Assistance Needed:    Which Pharmacy is filling the prescription (Not needed for infusion/clinic administered): [facility] - [place], [place] - [address] [place] [address]  Pharmacy Notified: Yes  Patient Notified: Yes

## 2020-04-01 ENCOUNTER — VIRTUAL VISIT (OUTPATIENT)
Dept: GERIATRICS | Facility: CLINIC | Age: 85
End: 2020-04-01
Payer: MEDICARE

## 2020-04-01 VITALS
TEMPERATURE: 97.7 F | BODY MASS INDEX: 21.79 KG/M2 | SYSTOLIC BLOOD PRESSURE: 147 MMHG | HEART RATE: 73 BPM | RESPIRATION RATE: 18 BRPM | HEIGHT: 60 IN | DIASTOLIC BLOOD PRESSURE: 75 MMHG | WEIGHT: 111 LBS

## 2020-04-01 DIAGNOSIS — F33.1 MODERATE EPISODE OF RECURRENT MAJOR DEPRESSIVE DISORDER (H): ICD-10-CM

## 2020-04-01 DIAGNOSIS — M62.81 GENERALIZED MUSCLE WEAKNESS: Primary | ICD-10-CM

## 2020-04-01 DIAGNOSIS — F41.1 GENERALIZED ANXIETY DISORDER: ICD-10-CM

## 2020-04-01 ASSESSMENT — MIFFLIN-ST. JEOR: SCORE: 953.37

## 2020-04-01 NOTE — PROGRESS NOTES
"[facility] [facility] E-VISIT   Ben Salvador is being evaluated via a billable video visit due to the restrictions of the Covid-19 pandemic.   The patient has been notified of following: \"This video visit will be conducted via a call between you and your provider. We have found that certain health care needs can be provided without the need for an in-person physical exam.  This service lets us provide the care you need with a video conversation. If during the course of the call the provider feels a video visit is not appropriate, you will not be charged for this service.\"   The provider has received verbal consent for a Video Visit from the patient or first contact? Yes  Video Start Time: 3:00  [place] Medical Record Number:  [id number]  Place of Location at the time of visit: [facility]  Chief Complaint   Patient presents with     Nursing Home Acute     HPI:  Ben Salvador  is a 89 year old (1/14/1928), who is being seen today for an E-visit.  HPI information obtained from: facility chart records, facility staff, patient report and [facility] chart review.     Resident was visited virtually today. He reports that he has had \"23 panic attacks\" in the past week where his legs begin to involuntarily tremor/shake. There is no trigger to this but he has noted that it can be as simple as a care attendant touching his legs while doing night time cares. Hydroxyzine was used PRN the past two weeks and was ineffective. He has been sleeping well. Appetite is fair.     He has been using the wheelchair for mobility since moving into the facility in January. He has not agreed to therapy in the past due to not wanting to go outside of his apartment in which he has not left since moving into the facility three months ago. He does however, agree to having home care PT come do sessions only in the apartment.     Past Medical and Surgical History reviewed in Epic today.  MEDICATIONS:    Current Outpatient " "Medications   Medication Sig Dispense Refill     allopurinol (ZYLOPRIM) 100 MG tablet TAKE 1 TABLET BY MOUTH ONCE DAILY 30 tablet PRN     ASPIRIN LOW DOSE 81 MG chewable tablet CHEW AND SWALLOW ONE TABLET BY MOUTH ONCE DAILY 30 tablet PRN     atorvastatin (LIPITOR) 40 MG tablet TAKE 1 TABLET BY MOUTH ONCE DAILY 30 tablet PRN     citalopram (CELEXA) 20 MG tablet Take 1 tablet (20 mg) by mouth daily       cyanocobalamin (VITAMIN B-12) 500 MCG tablet TAKE 1 TABLET BY MOUTH ONCE DAILY 30 tablet PRN     losartan (COZAAR) 25 MG tablet TAKE 1 TABLET BY MOUTH ONCE DAILY 30 tablet PRN     mirtazapine (REMERON) 7.5 MG tablet Take 1 tablet (7.5 mg) by mouth At Bedtime       Multiple Vitamins-Minerals (CEROVITE SENIOR) TABS TAKE 1 TABLET BY MOUTH ONCE DAILY 30 tablet PRN     nitroGLYcerin (NITROSTAT) 0.4 MG sublingual tablet For chest pain place 1 tablet under the tongue every 5 minutes for 3 doses. If symptoms persist 5 minutes after 1st dose call 911.       polyethylene glycol (MIRALAX/GLYCOLAX) packet Take 8.5 g by mouth daily as needed for constipation       traZODone (DESYREL) 50 MG tablet Take 12.5 mg BID a.m and HS. May have 12.5 mg daily PRN as well       triamcinolone (KENALOG) 0.1 % external ointment Apply topically daily as needed for irritation       VITAMIN D3 25 MCG (1000 UT) tablet TAKE TWO TABLETS (2000 UNITS) BY MOUTH ONCE DAILY  30 tablet PRN     REVIEW OF SYSTEMS: 4 point ROS including Respiratory, CV, GI and , other than that noted in the HPI,  is negative  Objective: BP (!) 147/75   Pulse 73   Temp 97.7  F (36.5  C)   Resp 18   Ht 1.448 m (4' 9\")   Wt 50.3 kg (111 lb)   BMI 24.02 kg/m     E-visit exam done given COVID-19 precautions.   GENERAL APPEARANCE:  Alert, in no distress, pleasant, cooperative, oriented x 4  EYES: no discharge or mattering on lids or lashes noted  ENT:  moist mucous membranes, hearing acuity intact  RESP: no respiratory distress,  patient is on room air  CV:   Edema none in " bilateral lower extremities.  M/S:   Gait and station: wheelchair bound due to fear of falling, able to move all extremities   NEURO: no facial asymmetry, no speech deficits and able to follow directions, moves all extremities symmetrically  PSYCH:  insight and judgement intact, memory intact, affect and mood flat/anxious    Labs:   CBC RESULTS:   Recent Labs   Lab Test 03/05/20   HGB 10.0*       Last Basic Metabolic Panel:  Recent Labs   Lab Test 03/05/20      POTASSIUM 4.5   CHLORIDE 107   LUZ 9.6   CO2 28   BUN 47*   CR 1.80*   GLC 82     ASSESSMENT/PLAN:  Generalized anxiety disorder  Depression  Generalized weakness  Continues to have periods of anxiety where his legs involuntarily shake despite increasing citalopram, decreasing mirtazapine for more of a sedative effect, and using hydroxyzine PRN. Patient would like different medication to help symptoms which the medial team is happy to try using trazadone but dont believe that medications will be able to completely control his symptoms. Question if he has lost muscle strength given that he has been wheelchair bound for the past three months and has not been out of his apartment since moving in. Would like to have therapy assess to determine strength in the lower extremities and possibly gain insight if there could potentially be an underlying neurological condition or if this is solely psychological.   --increase citalopram to 20 mg daily  --mirtazapine 7.5 mg at HS  --trazadone 12.5 mg BID and once daily PRN.   --PT for strengthening.      Electronically signed by:  SYBIL Milton CNP     Video-Visit Details  Type of service:  Video Visit  Video End Time (time video stopped): 3:12  Distant Location (provider location):  [facility]

## 2020-04-01 NOTE — LETTER
"    4/1/2020        RE: Ben Salvador  [facility]  [address] [facility] [address]        [facility] E-VISIT   Ben Salvador is being evaluated via a billable video visit due to the restrictions of the Covid-19 pandemic.   The patient has been notified of following: \"This video visit will be conducted via a call between you and your provider. We have found that certain health care needs can be provided without the need for an in-person physical exam.  This service lets us provide the care you need with a video conversation. If during the course of the call the provider feels a video visit is not appropriate, you will not be charged for this service.\"   The provider has received verbal consent for a Video Visit from the patient or first contact? Yes  Video Start Time: 3:00  [place] Medical Record Number:  [id number]  Place of Location at the time of visit: [facility]  Chief Complaint   Patient presents with     Nursing Home Acute     HPI:  Ben Salvador  is a 89 year old (1/14/1928), who is being seen today for an E-visit.  HPI information obtained from: facility chart records, facility staff, patient report and [place] Epic chart review.     Resident was visited virtually today. He reports that he has had \"23 panic attacks\" in the past week where his legs begin to involuntarily tremor/shake. There is no trigger to this but he has noted that it can be as simple as a care attendant touching his legs while doing night time cares. Hydroxyzine was used PRN the past two weeks and was ineffective. He has been sleeping well. Appetite is fair.     He has been using the wheelchair for mobility since moving into the facility in January. He has not agreed to therapy in the past due to not wanting to go outside of his apartment in which he has not left since moving into the facility three months ago. He does however, agree to having home care PT come do sessions only in the " "apartment.     Past Medical and Surgical History reviewed in Epic today.  MEDICATIONS:    Current Outpatient Medications   Medication Sig Dispense Refill     allopurinol (ZYLOPRIM) 100 MG tablet TAKE 1 TABLET BY MOUTH ONCE DAILY 30 tablet PRN     ASPIRIN LOW DOSE 81 MG chewable tablet CHEW AND SWALLOW ONE TABLET BY MOUTH ONCE DAILY 30 tablet PRN     atorvastatin (LIPITOR) 40 MG tablet TAKE 1 TABLET BY MOUTH ONCE DAILY 30 tablet PRN     citalopram (CELEXA) 20 MG tablet Take 1 tablet (20 mg) by mouth daily       cyanocobalamin (VITAMIN B-12) 500 MCG tablet TAKE 1 TABLET BY MOUTH ONCE DAILY 30 tablet PRN     losartan (COZAAR) 25 MG tablet TAKE 1 TABLET BY MOUTH ONCE DAILY 30 tablet PRN     mirtazapine (REMERON) 7.5 MG tablet Take 1 tablet (7.5 mg) by mouth At Bedtime       Multiple Vitamins-Minerals (CEROVITE SENIOR) TABS TAKE 1 TABLET BY MOUTH ONCE DAILY 30 tablet PRN     nitroGLYcerin (NITROSTAT) 0.4 MG sublingual tablet For chest pain place 1 tablet under the tongue every 5 minutes for 3 doses. If symptoms persist 5 minutes after 1st dose call 911.       polyethylene glycol (MIRALAX/GLYCOLAX) packet Take 8.5 g by mouth daily as needed for constipation       traZODone (DESYREL) 50 MG tablet Take 12.5 mg BID a.m and HS. May have 12.5 mg daily PRN as well       triamcinolone (KENALOG) 0.1 % external ointment Apply topically daily as needed for irritation       VITAMIN D3 25 MCG (1000 UT) tablet TAKE TWO TABLETS (2000 UNITS) BY MOUTH ONCE DAILY ***NOTE DOSAGE/STRENGTH*** 30 tablet PRN     REVIEW OF SYSTEMS: 4 point ROS including Respiratory, CV, GI and , other than that noted in the HPI,  is negative  Objective: BP (!) 147/75   Pulse 73   Temp 97.7  F (36.5  C)   Resp 18   Ht 1.448 m (4' 9\")   Wt 50.3 kg (111 lb)   BMI 24.02 kg/m     E-visit exam done given COVID-19 precautions.   GENERAL APPEARANCE:  Alert, in no distress, pleasant, cooperative, oriented x 4  EYES: no discharge or mattering on lids or lashes " noted  ENT:  moist mucous membranes, hearing acuity intact  RESP: no respiratory distress,  patient is on room air  CV:   Edema none in bilateral lower extremities.  M/S:   Gait and station: wheelchair bound due to fear of falling, able to move all extremities   NEURO: no facial asymmetry, no speech deficits and able to follow directions, moves all extremities symmetrically  PSYCH:  insight and judgement intact, memory intact, affect and mood flat/anxious    Labs:   CBC RESULTS:   Recent Labs   Lab Test 03/05/20   HGB 10.0*       Last Basic Metabolic Panel:  Recent Labs   Lab Test 03/05/20      POTASSIUM 4.5   CHLORIDE 107   LUZ 9.6   CO2 28   BUN 47*   CR 1.80*   GLC 82     ASSESSMENT/PLAN:  Generalized anxiety disorder  Depression  Generalized weakness  Continues to have periods of anxiety where his legs involuntarily shake despite increasing citalopram, decreasing mirtazapine for more of a sedative effect, and using hydroxyzine PRN. Patient would like different medication to help symptoms which the medial team is happy to try using trazadone but dont believe that medications will be able to completely control his symptoms. Question if he has lost muscle strength given that he has been wheelchair bound for the past three months and has not been out of his apartment since moving in. Would like to have therapy assess to determine strength in the lower extremities and possibly gain insight if there could potentially be an underlying neurological condition or if this is solely psychological.   --increase citalopram to 20 mg daily  --mirtazapine 7.5 mg at HS  --trazadone 12.5 mg BID and once daily PRN.   --PT for strengthening.      Electronically signed by:  SYBIL Milton CNP     Video-Visit Details  Type of service:  Video Visit  Video End Time (time video stopped): 3:12  Distant Location (provider location):  [place] [facility]           Sincerely,        SYBIL Milton CNP

## 2020-04-01 NOTE — LETTER
"    4/1/2020        RE: Ben Salvador  [facility]  [address] [facility] [address]        [place] [facility] E-VISIT   Ben Salvador is being evaluated via a billable video visit due to the restrictions of the Covid-19 pandemic.   The patient has been notified of following: \"This video visit will be conducted via a call between you and your provider. We have found that certain health care needs can be provided without the need for an in-person physical exam.  This service lets us provide the care you need with a video conversation. If during the course of the call the provider feels a video visit is not appropriate, you will not be charged for this service.\"   The provider has received verbal consent for a Video Visit from the patient or first contact? Yes  Video Start Time: 3:00  [place] Medical Record Number:  [id number]  Place of Location at the time of visit: [facility]  Chief Complaint   Patient presents with     Nursing Home Acute     HPI:  Ben Salvador  is a 89 year old (1/14/1928), who is being seen today for an E-visit.  HPI information obtained from: facility chart records, facility staff, patient report and [place] Epic chart review.     Resident was visited virtually today. He reports that he has had \"23 panic attacks\" in the past week where his legs begin to involuntarily tremor/shake. There is no trigger to this but he has noted that it can be as simple as a care attendant touching his legs while doing night time cares. Hydroxyzine was used PRN the past two weeks and was ineffective. He has been sleeping well. Appetite is fair.     He has been using the wheelchair for mobility since moving into the facility in January. He has not agreed to therapy in the past due to not wanting to go outside of his apartment in which he has not left since moving into the facility three months ago. He does however, agree to having home care PT come do sessions only in the " "apartment.     Past Medical and Surgical History reviewed in Epic today.  MEDICATIONS:    Current Outpatient Medications   Medication Sig Dispense Refill     allopurinol (ZYLOPRIM) 100 MG tablet TAKE 1 TABLET BY MOUTH ONCE DAILY 30 tablet PRN     ASPIRIN LOW DOSE 81 MG chewable tablet CHEW AND SWALLOW ONE TABLET BY MOUTH ONCE DAILY 30 tablet PRN     atorvastatin (LIPITOR) 40 MG tablet TAKE 1 TABLET BY MOUTH ONCE DAILY 30 tablet PRN     citalopram (CELEXA) 20 MG tablet Take 1 tablet (20 mg) by mouth daily       cyanocobalamin (VITAMIN B-12) 500 MCG tablet TAKE 1 TABLET BY MOUTH ONCE DAILY 30 tablet PRN     losartan (COZAAR) 25 MG tablet TAKE 1 TABLET BY MOUTH ONCE DAILY 30 tablet PRN     mirtazapine (REMERON) 7.5 MG tablet Take 1 tablet (7.5 mg) by mouth At Bedtime       Multiple Vitamins-Minerals (CEROVITE SENIOR) TABS TAKE 1 TABLET BY MOUTH ONCE DAILY 30 tablet PRN     nitroGLYcerin (NITROSTAT) 0.4 MG sublingual tablet For chest pain place 1 tablet under the tongue every 5 minutes for 3 doses. If symptoms persist 5 minutes after 1st dose call 911.       polyethylene glycol (MIRALAX/GLYCOLAX) packet Take 8.5 g by mouth daily as needed for constipation       traZODone (DESYREL) 50 MG tablet Take 12.5 mg BID a.m and HS. May have 12.5 mg daily PRN as well       triamcinolone (KENALOG) 0.1 % external ointment Apply topically daily as needed for irritation       VITAMIN D3 25 MCG (1000 UT) tablet TAKE TWO TABLETS (2000 UNITS) BY MOUTH ONCE DAILY  30 tablet PRN     REVIEW OF SYSTEMS: 4 point ROS including Respiratory, CV, GI and , other than that noted in the HPI,  is negative  Objective: BP (!) 147/75   Pulse 73   Temp 97.7  F (36.5  C)   Resp 18   Ht 1.448 m (4' 9\")   Wt 50.3 kg (111 lb)   BMI 24.02 kg/m     E-visit exam done given COVID-19 precautions.   GENERAL APPEARANCE:  Alert, in no distress, pleasant, cooperative, oriented x 4  EYES: no discharge or mattering on lids or lashes noted  ENT:  moist mucous " membranes, hearing acuity intact  RESP: no respiratory distress,  patient is on room air  CV:   Edema none in bilateral lower extremities.  M/S:   Gait and station: wheelchair bound due to fear of falling, able to move all extremities   NEURO: no facial asymmetry, no speech deficits and able to follow directions, moves all extremities symmetrically  PSYCH:  insight and judgement intact, memory intact, affect and mood flat/anxious    Labs:   CBC RESULTS:   Recent Labs   Lab Test 03/05/20   HGB 10.0*       Last Basic Metabolic Panel:  Recent Labs   Lab Test 03/05/20      POTASSIUM 4.5   CHLORIDE 107   LUZ 9.6   CO2 28   BUN 47*   CR 1.80*   GLC 82     ASSESSMENT/PLAN:  Generalized anxiety disorder  Depression  Generalized weakness  Continues to have periods of anxiety where his legs involuntarily shake despite increasing citalopram, decreasing mirtazapine for more of a sedative effect, and using hydroxyzine PRN. Patient would like different medication to help symptoms which the medial team is happy to try using trazadone but dont believe that medications will be able to completely control his symptoms. Question if he has lost muscle strength given that he has been wheelchair bound for the past three months and has not been out of his apartment since moving in. Would like to have therapy assess to determine strength in the lower extremities and possibly gain insight if there could potentially be an underlying neurological condition or if this is solely psychological.   --increase citalopram to 20 mg daily  --mirtazapine 7.5 mg at HS  --trazadone 12.5 mg BID and once daily PRN.   --PT for strengthening.      Electronically signed by:  SYBIL Milton CNP     Video-Visit Details  Type of service:  Video Visit  Video End Time (time video stopped): 3:12  Distant Location (provider location):  [place] [facility]           Sincerely,        SYBIL Milton CNP

## 2020-04-02 RX ORDER — MIRTAZAPINE 7.5 MG/1
7.5 TABLET, FILM COATED ORAL AT BEDTIME
COMMUNITY
Start: 2020-04-02 | End: 2020-04-15

## 2020-04-02 RX ORDER — TRAZODONE HYDROCHLORIDE 50 MG/1
TABLET, FILM COATED ORAL
COMMUNITY
Start: 2020-04-02 | End: 2020-04-15

## 2020-04-02 RX ORDER — CITALOPRAM HYDROBROMIDE 20 MG/1
20 TABLET ORAL DAILY
COMMUNITY
Start: 2020-04-02 | End: 2020-04-15

## 2020-04-08 ASSESSMENT — MIFFLIN-ST. JEOR: SCORE: 953.37

## 2020-04-15 ENCOUNTER — TELEPHONE (OUTPATIENT)
Dept: GERIATRICS | Facility: CLINIC | Age: 85
End: 2020-04-15

## 2020-04-15 DIAGNOSIS — F41.1 GENERALIZED ANXIETY DISORDER: ICD-10-CM

## 2020-04-15 RX ORDER — TRAZODONE HYDROCHLORIDE 50 MG/1
TABLET, FILM COATED ORAL
Qty: 25 TABLET | Status: SHIPPED | OUTPATIENT
Start: 2020-04-15 | End: 2021-05-01

## 2020-04-15 NOTE — TELEPHONE ENCOUNTER
What is the correct dose that pt is to be taking on a daily basis. Per facility it is suppose to be 25mg twice daily and 25mg daily as needed, not 12.5mg twice daily and daily as needed. Please do send us a new script if dose is suppose to be 25mg. Thank you

## 2020-04-16 ENCOUNTER — VIRTUAL VISIT (OUTPATIENT)
Dept: GERIATRICS | Facility: CLINIC | Age: 85
End: 2020-04-16
Payer: MEDICARE

## 2020-04-16 VITALS
BODY MASS INDEX: 21.79 KG/M2 | SYSTOLIC BLOOD PRESSURE: 166 MMHG | TEMPERATURE: 96.4 F | HEART RATE: 66 BPM | RESPIRATION RATE: 18 BRPM | WEIGHT: 111 LBS | HEIGHT: 60 IN | DIASTOLIC BLOOD PRESSURE: 75 MMHG

## 2020-04-16 DIAGNOSIS — L89.622 PRESSURE INJURY OF LEFT HEEL, STAGE 2 (H): ICD-10-CM

## 2020-04-16 DIAGNOSIS — F41.9 ANXIETY: ICD-10-CM

## 2020-04-16 DIAGNOSIS — R25.3 MUSCLE TWITCHING: Primary | ICD-10-CM

## 2020-04-16 ASSESSMENT — MIFFLIN-ST. JEOR: SCORE: 953.37

## 2020-04-16 NOTE — PROGRESS NOTES
"[facility]   Ben Salvador is being evaluated via a billable video visit due to the restrictions of the Covid-19 pandemic.   The patient has been notified of following:  \"This video visit will be conducted via a call between you and your provider. We have found that certain health care needs can be provided without the need for an in-person physical exam.  This service lets us provide the care you need with a video conversation. If during the course of the call the provider feels a video visit is not appropriate, you will not be charged for this service.\"   The provider has received verbal consent for a Video Visit from the patient or first contact? Yes  Video Start Time: 2:00  [place] Medical Record Number:  [id number]  Place of Location at the time of visit: [facility]  Chief Complaint   Patient presents with     RECHECK     HPI:  Ben Salvador  is a 89 year old (1/14/1928), who is being seen today for a visit.  HPI information obtained from: facility chart records, facility staff, patient report and [facility] chart review.     Mr. Salvador was visited virtually today with the facility RN present. He states that he has not noted a difference to anxiety since starting and increasing the trazadone. He describes yesterday as being \"the worst day of attacks\", with 8 episodes of the lower extremities shaking. He has been working with PT which he does not believe is helping with the anxiety; in fact, had an attack during their session yesterday.     Spoke with homecare nurse this week whom has witnessed these attacks and describes them as violent shaking.     Past Medical and Surgical History reviewed in Epic today.  MEDICATIONS:  Current Outpatient Medications   Medication Sig Dispense Refill     allopurinol (ZYLOPRIM) 100 MG tablet TAKE 1 TABLET BY MOUTH ONCE DAILY 30 tablet PRN     ASPIRIN LOW DOSE 81 MG chewable tablet CHEW AND SWALLOW ONE TABLET BY MOUTH ONCE DAILY 30 tablet PRN     " "atorvastatin (LIPITOR) 40 MG tablet TAKE 1 TABLET BY MOUTH ONCE DAILY 30 tablet PRN     citalopram (CELEXA) 20 MG tablet TAKE 1 TABLET BY MOUTH ONCE DAILY 28 tablet PRN     cyanocobalamin (VITAMIN B-12) 500 MCG tablet TAKE 1 TABLET BY MOUTH ONCE DAILY 30 tablet PRN     losartan (COZAAR) 25 MG tablet TAKE 1 TABLET BY MOUTH ONCE DAILY 30 tablet PRN     mirtazapine (REMERON) 7.5 MG tablet TAKE 1 TABLET BY MOUTH AT BEDTIME 31 tablet PRN     Multiple Vitamins-Minerals (CEROVITE SENIOR) TABS TAKE 1 TABLET BY MOUTH ONCE DAILY 30 tablet PRN     nitroGLYcerin (NITROSTAT) 0.4 MG sublingual tablet For chest pain place 1 tablet under the tongue every 5 minutes for 3 doses. If symptoms persist 5 minutes after 1st dose call 911.       polyethylene glycol (MIRALAX/GLYCOLAX) packet Take 8.5 g by mouth daily as needed for constipation       traZODone (DESYREL) 50 MG tablet Take 1/2 (25 mg) BY MOUTH TWICE DAILY;& ONCE DAILY AS NEEDED FOR ANXIETY 25 tablet PRN     triamcinolone (KENALOG) 0.1 % external ointment Apply topically daily as needed for irritation       VITAMIN D3 25 MCG (1000 UT) tablet TAKE TWO TABLETS (2000 UNITS) BY MOUTH ONCE DAILY  30 tablet PRN     REVIEW OF SYSTEMS: 4 point ROS including Respiratory, CV, GI and , other than that noted in the HPI,  is negative  Objective: BP (!) 166/75   Pulse 66   Temp 96.4  F (35.8  C)   Resp 18   Ht 1.448 m (4' 9\")   Wt 50.3 kg (111 lb)   BMI 24.02 kg/m    Limited visit exam done given COVID-19 precautions.   GENERAL APPEARANCE:  Alert, in no distress, pleasant, cooperative, oriented x 4  EYES: no discharge or mattering on lids or lashes noted  ENT:  hearing acuity intact  RESP: no respiratory distress,  patient is on room air  CV:   Edema none in bilateral lower extremities.  M/S:   Gait and station: wheelchair bound due to fear of falling, able to move all extremities   NEURO: no facial asymmetry, no speech deficits and able to follow directions, moves all extremities " symmetrically  PSYCH:  insight and judgement intact, memory intact, affect and mood flat/anxious    Labs:   reviewed    ASSESSMENT/PLAN:  Lower extremity shaking   Described by homecare as violent shaking of lower extremities during episodes. Have tried multiple medications to control anxiety but nothing has helped so wonder now if there us an underlying neurological condition rather than the anxiety causing the tremors. Spoke with collaborating physician, Dr. Coffman and we would like to trial low dose sinemet to see if that will help with the tremors. Recommended to establish care with neurology after the current COVID pandemic, but resident has a fear of leaving his apartment which he has not stepped out side of since January. Therefore, hoping that there can be a virtual visit completed with neurology.  --trial of sinemet 10/100 mg BID.   --will check in with patient in two weeks and will adjust the dose if needed.     Anxiety  He does appear more calm, less worried look on his face during our visit today so wonder if trazadone is helping to curb some of the anxiety.   --continue with trazadone 25 mg BID and once daily PRN  --citalopram 20 mg daily  --mirtazapine 7.5 mg daily    Stage II pressure sore  To the left heel. Currently being treated by home care.   --continue treatment per home care.     Electronically signed by:  SYBIL Milton CNP     Video-Visit Details  Type of service:  Video Visit  Video End Time (time video stopped): 2:11  Distant Location (provider location):  [facility]

## 2020-04-16 NOTE — LETTER
"    4/16/2020        RE: Ben Salvador  [facility]  [address] [address] [facility] [address]        [place] [facility]   Ben Salvador is being evaluated via a billable video visit due to the restrictions of the Covid-19 pandemic.   The patient has been notified of following:  \"This video visit will be conducted via a call between you and your provider. We have found that certain health care needs can be provided without the need for an in-person physical exam.  This service lets us provide the care you need with a video conversation. If during the course of the call the provider feels a video visit is not appropriate, you will not be charged for this service.\"   The provider has received verbal consent for a Video Visit from the patient or first contact? Yes  Video Start Time: 2:00  [place] Medical Record Number:  [id number]  Place of Location at the time of visit: [facility]  Chief Complaint   Patient presents with     RECHECK     HPI:  Ben Salvador  is a 89 year old (1/14/1928), who is being seen today for a visit.  HPI information obtained from: facility chart records, facility staff, patient report and [facility] chart review.     Mr. Salvador was visited virtually today with the facility RN present. He states that he has not noted a difference to anxiety since starting and increasing the trazadone. He describes yesterday as being \"the worst day of attacks\", with 8 episodes of the lower extremities shaking. He has been working with PT which he does not believe is helping with the anxiety; in fact, had an attack during their session yesterday.     Spoke with homecare nurse this week whom has witnessed these attacks and describes them as violent shaking.     Past Medical and Surgical History reviewed in Epic today.  MEDICATIONS:  Current Outpatient Medications   Medication Sig Dispense Refill     allopurinol (ZYLOPRIM) 100 MG tablet TAKE 1 TABLET BY MOUTH ONCE DAILY 30 tablet " "PRN     ASPIRIN LOW DOSE 81 MG chewable tablet CHEW AND SWALLOW ONE TABLET BY MOUTH ONCE DAILY 30 tablet PRN     atorvastatin (LIPITOR) 40 MG tablet TAKE 1 TABLET BY MOUTH ONCE DAILY 30 tablet PRN     citalopram (CELEXA) 20 MG tablet TAKE 1 TABLET BY MOUTH ONCE DAILY 28 tablet PRN     cyanocobalamin (VITAMIN B-12) 500 MCG tablet TAKE 1 TABLET BY MOUTH ONCE DAILY 30 tablet PRN     losartan (COZAAR) 25 MG tablet TAKE 1 TABLET BY MOUTH ONCE DAILY 30 tablet PRN     mirtazapine (REMERON) 7.5 MG tablet TAKE 1 TABLET BY MOUTH AT BEDTIME 31 tablet PRN     Multiple Vitamins-Minerals (CEROVITE SENIOR) TABS TAKE 1 TABLET BY MOUTH ONCE DAILY 30 tablet PRN     nitroGLYcerin (NITROSTAT) 0.4 MG sublingual tablet For chest pain place 1 tablet under the tongue every 5 minutes for 3 doses. If symptoms persist 5 minutes after 1st dose call 911.       polyethylene glycol (MIRALAX/GLYCOLAX) packet Take 8.5 g by mouth daily as needed for constipation       traZODone (DESYREL) 50 MG tablet Take 1/2 (25 mg) BY MOUTH TWICE DAILY;& ONCE DAILY AS NEEDED FOR ANXIETY 25 tablet PRN     triamcinolone (KENALOG) 0.1 % external ointment Apply topically daily as needed for irritation       VITAMIN D3 25 MCG (1000 UT) tablet TAKE TWO TABLETS (2000 UNITS) BY MOUTH ONCE DAILY  30 tablet PRN     REVIEW OF SYSTEMS: 4 point ROS including Respiratory, CV, GI and , other than that noted in the HPI,  is negative  Objective: BP (!) 166/75   Pulse 66   Temp 96.4  F (35.8  C)   Resp 18   Ht 1.448 m (4' 9\")   Wt 50.3 kg (111 lb)   BMI 24.02 kg/m    Limited visit exam done given COVID-19 precautions.   GENERAL APPEARANCE:  Alert, in no distress, pleasant, cooperative, oriented x 4  EYES: no discharge or mattering on lids or lashes noted  ENT:  hearing acuity intact  RESP: no respiratory distress,  patient is on room air  CV:   Edema none in bilateral lower extremities.  M/S:   Gait and station: wheelchair bound due to fear of falling, able to move all " extremities   NEURO: no facial asymmetry, no speech deficits and able to follow directions, moves all extremities symmetrically  PSYCH:  insight and judgement intact, memory intact, affect and mood flat/anxious    Labs:   reviewed    ASSESSMENT/PLAN:  Lower extremity shaking   Described by homecare as violent shaking of lower extremities during episodes. Have tried multiple medications to control anxiety but nothing has helped so wonder now if there us an underlying neurological condition rather than the anxiety causing the tremors. Spoke with collaborating physician, Dr. Coffman and we would like to trial low dose sinemet to see if that will help with the tremors. Recommended to establish care with neurology after the current COVID pandemic, but resident has a fear of leaving his apartment which he has not stepped out side of since January. Therefore, hoping that there can be a virtual visit completed with neurology.  --trial of sinemet 10/100 mg BID.   --will check in with patient in two weeks and will adjust the dose if needed.     Anxiety  He does appear more calm, less worried look on his face during our visit today so wonder if trazadone is helping to curb some of the anxiety.   --continue with trazadone 25 mg BID and once daily PRN  --citalopram 20 mg daily  --mirtazapine 7.5 mg daily    Stage II pressure sore  To the left heel. Currently being treated by home care.   --continue treatment per home care.     Electronically signed by:  SYBIL Milton CNP     Video-Visit Details  Type of service:  Video Visit  Video End Time (time video stopped): 2:11  Distant Location (provider location):  [place] [facility]           Sincerely,        SYBIL Milton CNP

## 2020-04-29 VITALS
DIASTOLIC BLOOD PRESSURE: 75 MMHG | WEIGHT: 111 LBS | HEIGHT: 60 IN | HEART RATE: 66 BPM | BODY MASS INDEX: 21.79 KG/M2 | SYSTOLIC BLOOD PRESSURE: 166 MMHG | TEMPERATURE: 96.4 F

## 2020-04-29 ASSESSMENT — MIFFLIN-ST. JEOR: SCORE: 953.37

## 2020-04-29 NOTE — PROGRESS NOTES
"[facility]   Ben Salvador is being evaluated via a billable video visit due to the restrictions of the Covid-19 pandemic.   The patient has been notified of following:  \"This video visit will be conducted via a call between you and your provider. We have found that certain health care needs can be provided without the need for an in-person physical exam.  This service lets us provide the care you need with a video conversation. If during the course of the call the provider feels a video visit is not appropriate, you will not be charged for this service.\"   The provider has received verbal consent for a Video Visit from the patient or first contact? Yes  Patient  or facility staff would like the video invitation sent by: N/A   Video Start Time: 3:35 PM  [place] Medical Record Number:  [id number]  Place of Location at the time of visit: [facility]  Chief Complaint   Patient presents with     RECHECK     MD visit, tremors and spasms     HPI:  Ben Salvador  is a 89 year old (1/14/1928), who is being seen today for a visit.  HPI information obtained from: patient report and [facility] chart review.  He is seen at the [facility] where he has resided for 3 months (admit 2/2020) seen to follow up LE tremors and anxiety.  Pt was started on Sinemet 2 weeks ago, and pt reports this has been helpful.  \"I still have the episodes, but not nearly as frequently or as severe.\"   He does not want change in meds today.          By chart review, patient had a [facility] admission in December 2019 for recurrent falls and worsened anxiety.   He had been taking lorazepam at home for the anxiety, worsening the falls, and this was tapered off.  He transitioned to [facility] where his anxiety, panic attacks and fear of falling impaired his ability to progress with therapies.    Mirtazapine started and he was followed by the Psychologist.  By the end of his " month-long stay he was able to ambulate 300' with FWW and min assist, independent with transfers.   Since [place] admission he has not tried walking and has declined [facility] PHYSICAL THERAPY / OCCUPATIONAL THERAPY, retains disabling fear of falling.             Pt has been primary caregiver for his wife Mariela, but not eating well, losing weight and getting weaker.   After hospitalization their family determined they need more support and have moved them to [place] for permanent placement.   Neither has left the apartment since they moved in, but family notes they were similarly isolative at home.       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  They have a son Torin and daughter Dixie.     Non smoker    MEDICATIONS:  Current Outpatient Medications   Medication Sig Dispense Refill     allopurinol (ZYLOPRIM) 100 MG tablet TAKE 1 TABLET BY MOUTH ONCE DAILY 30 tablet PRN     ASPIRIN LOW DOSE 81 MG chewable tablet CHEW AND SWALLOW ONE TABLET BY MOUTH ONCE DAILY 30 tablet PRN     atorvastatin (LIPITOR) 40 MG tablet TAKE 1 TABLET BY MOUTH ONCE DAILY 30 tablet PRN     citalopram (CELEXA) 20 MG tablet TAKE 1 TABLET BY MOUTH ONCE DAILY 28 tablet PRN     cyanocobalamin (VITAMIN B-12) 500 MCG tablet TAKE 1 TABLET BY MOUTH ONCE DAILY 30 tablet PRN     losartan (COZAAR) 25 MG tablet TAKE 1 TABLET BY MOUTH ONCE DAILY 30 tablet PRN     mirtazapine (REMERON) 7.5 MG tablet TAKE 1 TABLET BY MOUTH AT BEDTIME 31 tablet PRN     Multiple Vitamins-Minerals (CEROVITE SENIOR) TABS TAKE 1 TABLET BY MOUTH ONCE DAILY 30 tablet PRN     nitroGLYcerin (NITROSTAT) 0.4 MG sublingual tablet For chest pain place 1 tablet under the tongue every 5 minutes for 3 doses. If symptoms  "persist 5 minutes after 1st dose call 911.       polyethylene glycol (MIRALAX/GLYCOLAX) packet Take 8.5 g by mouth daily as needed for constipation       traZODone (DESYREL) 50 MG tablet Take 1/2 (25 mg) BY MOUTH TWICE DAILY;& ONCE DAILY AS NEEDED FOR ANXIETY 25 tablet PRN     triamcinolone (KENALOG) 0.1 % external ointment Apply topically daily as needed for irritation       VITAMIN D3 25 MCG (1000 UT) tablet TAKE TWO TABLETS (2000 UNITS) BY MOUTH ONCE DAILY  30 tablet PRN     REVIEW OF SYSTEMS: 4 point ROS including Respiratory, CV, GI and , other than that noted in the HPI,  is negative    Objective: BP (!) 166/75   Pulse 66   Temp 96.4  F (35.8  C)   Ht 1.448 m (4' 9\")   Wt 50.3 kg (111 lb)   BMI 24.02 kg/m    Limited visit exam done given COVID-19 precautions.   GENERAL APPEARANCE:  Alert, in no distress   Answers questions appropriately, better spirits than at last visit.   Up to WC, does not ambulate but able to transfer on his own.      Recent Labs   Lab Test 03/05/20   HGB 10.0*     Most Recent 3 BMP's:  Recent Labs   Lab Test 03/05/20      POTASSIUM 4.5   CHLORIDE 107   CO2 28   BUN 47*   CR 1.80*   ANIONGAP 7   LUZ 9.6   GLC 82       ASSESSMENT/PLAN:  (G20) Parkinsonism, unspecified Parkinsonism type (H)    Comment: LE tremors, freezing  Plan: Sinemet 10/100 daily has been helpful; consider increasing to bid if patient agreeable.     (F41.1) Generalized anxiety disorder   (F41.0) Panic attack  (F33.1) Moderate episode of recurrent major depressive disorder (H)  Comment: ongoing, mood better today  Plan: mirtazapine 15 mg/HS and citalopram 10 mg/day       (I10) Essential hypertension  (N18.3) CKD (chronic kidney disease) stage 3, GFR 30-59 ml/min (H)  Comment: GFR 42, bp control is okay.     Plan: losartan 25 mg/day, follow bps and BMP       (I25.9) Chronic ischemic heart disease  Comment: h/o MI, last EF 45% with inferior and inferolateral hypokinesis  Plan: remains on daily ASA and statin " for secondary prevention.        (R63.4) Weight loss  Comment: preceded admission  Plan: some improvement in [place], continue mirtazapine with hopes to boost appetite.      (R29.6) Falls frequently  (M62.81) Generalized muscle weakness  Comment: currently only using WC and needs assist for all ADLs     Plan: he remains uninterested in [facility] PH[facility]ERAP[facility]     Continue vit D, dietary calcium       (D51.9) Anemia due to vitamin B12 deficiency, unspecified B12 deficiency type  Comment: macrocytic anemia   Plan: continue B12 replacement     Electronically signed by:  Yessenia Coffman MD     Video-Visit Details  Type of service:  Video Visit  Video End Time (time video stopped): 3:50 pm  Distant Location (provider location):  [facility]

## 2020-05-01 ENCOUNTER — VIRTUAL VISIT (OUTPATIENT)
Dept: GERIATRICS | Facility: CLINIC | Age: 85
End: 2020-05-01
Payer: MEDICARE

## 2020-05-01 DIAGNOSIS — M62.81 GENERALIZED MUSCLE WEAKNESS: ICD-10-CM

## 2020-05-01 DIAGNOSIS — G20.C PARKINSONISM, UNSPECIFIED PARKINSONISM TYPE (H): Primary | ICD-10-CM

## 2020-05-01 DIAGNOSIS — R29.6 FALLS FREQUENTLY: ICD-10-CM

## 2020-05-01 DIAGNOSIS — R63.4 WEIGHT LOSS: ICD-10-CM

## 2020-05-01 DIAGNOSIS — F41.1 GENERALIZED ANXIETY DISORDER: ICD-10-CM

## 2020-05-01 DIAGNOSIS — I10 ESSENTIAL HYPERTENSION: ICD-10-CM

## 2020-08-11 ENCOUNTER — ASSISTED LIVING VISIT (OUTPATIENT)
Dept: GERIATRICS | Facility: CLINIC | Age: 85
End: 2020-08-11
Payer: MEDICARE

## 2020-08-11 VITALS
WEIGHT: 111 LBS | BODY MASS INDEX: 21.79 KG/M2 | RESPIRATION RATE: 18 BRPM | HEART RATE: 66 BPM | OXYGEN SATURATION: 95 % | TEMPERATURE: 97.6 F | DIASTOLIC BLOOD PRESSURE: 75 MMHG | SYSTOLIC BLOOD PRESSURE: 166 MMHG | HEIGHT: 60 IN

## 2020-08-11 DIAGNOSIS — E44.0 MODERATE PROTEIN MALNUTRITION (H): ICD-10-CM

## 2020-08-11 DIAGNOSIS — D51.9 ANEMIA DUE TO VITAMIN B12 DEFICIENCY, UNSPECIFIED B12 DEFICIENCY TYPE: ICD-10-CM

## 2020-08-11 DIAGNOSIS — I10 ESSENTIAL HYPERTENSION: ICD-10-CM

## 2020-08-11 DIAGNOSIS — F33.1 MODERATE EPISODE OF RECURRENT MAJOR DEPRESSIVE DISORDER (H): ICD-10-CM

## 2020-08-11 DIAGNOSIS — N18.30 CKD (CHRONIC KIDNEY DISEASE) STAGE 3, GFR 30-59 ML/MIN (H): ICD-10-CM

## 2020-08-11 DIAGNOSIS — I25.9 CHRONIC ISCHEMIC HEART DISEASE: ICD-10-CM

## 2020-08-11 DIAGNOSIS — G20.C PARKINSONISM, UNSPECIFIED PARKINSONISM TYPE (H): Primary | ICD-10-CM

## 2020-08-11 DIAGNOSIS — F41.1 GENERALIZED ANXIETY DISORDER: ICD-10-CM

## 2020-08-11 NOTE — LETTER
"    8/11/2020        RE: Ben Salvador  [facility]  [address] [address]        [facility]  [place] Medical Record Number: [id number]  Place of Service where encounter took place: [facility] ([facility] [[id number]]  Chief Complaint   Patient presents with     RECHECK       HPI:    Ben Salvador is a 89 year old (1/14/1928), who is being seen today for an episodic care visit. HPI information obtained from: facility chart records, facility staff, patient report, [facility] chart review and Care Everywhere [facility] chart review.    Mr. Salvador was visited today while in his room sitting in the wheelchair. He feels well and is happy that he no longer has the episodes of bilateral legs shaking. Denies anxiety; declines tapering off the morning trazadone, \"if it's not broken, why fix it?\" he has been sleeping well, often going to bed around 10 p.m and walking around 8. He has not been eating well due to food at the facility but also decreased appetite. He reports that he broke a tooth a couple of weeks ago while chewing meet. Denies pain or discomfort; declines going to see a dentist for evaluation.     Past Medical and Surgical History reviewed in Epic today.    MEDICATIONS:  Current Outpatient Medications   Medication Sig Dispense Refill     allopurinol (ZYLOPRIM) 100 MG tablet TAKE 1 TABLET BY MOUTH ONCE DAILY 30 tablet PRN     ASPIRIN LOW DOSE 81 MG chewable tablet CHEW AND SWALLOW ONE TABLET BY MOUTH ONCE DAILY 30 tablet PRN     atorvastatin (LIPITOR) 40 MG tablet TAKE 1 TABLET BY MOUTH ONCE DAILY 30 tablet PRN     carbidopa-levodopa (SINEMET)  MG tablet TAKE 1 TABLET BY MOUTH TWICE DAILY IN THE MORNING & IN THE EVENING 56 tablet PRN     citalopram (CELEXA) 20 MG tablet TAKE 1 TABLET BY MOUTH ONCE DAILY 28 tablet PRN     cyanocobalamin (VITAMIN B-12) 500 MCG tablet TAKE 1 TABLET BY MOUTH ONCE DAILY 30 tablet PRN     losartan (COZAAR) 25 MG tablet TAKE 1 TABLET " "BY MOUTH ONCE DAILY 30 tablet PRN     mirtazapine (REMERON) 7.5 MG tablet TAKE 1 TABLET BY MOUTH AT BEDTIME 31 tablet PRN     Multiple Vitamins-Minerals (CEROVITE SENIOR) TABS TAKE 1 TABLET BY MOUTH ONCE DAILY 30 tablet PRN     nitroGLYcerin (NITROSTAT) 0.4 MG sublingual tablet For chest pain place 1 tablet under the tongue every 5 minutes for 3 doses. If symptoms persist 5 minutes after 1st dose call 911.       polyethylene glycol (MIRALAX/GLYCOLAX) packet Take 8.5 g by mouth daily as needed for constipation       traZODone (DESYREL) 50 MG tablet Take 1/2 (25 mg) BY MOUTH TWICE DAILY;& ONCE DAILY AS NEEDED FOR ANXIETY 25 tablet PRN     triamcinolone (KENALOG) 0.1 % external ointment Apply topically daily as needed for irritation       VITAMIN D3 25 MCG (1000 UT) tablet TAKE TWO TABLETS (2000 UNITS) BY MOUTH ONCE DAILY  30 tablet PRN     REVIEW OF SYSTEMS:  4 point ROS including Respiratory, CV, GI and , other than that noted in the HPI,  is negative    Objective:  BP (!) 166/75   Pulse 66   Temp 97.6  F (36.4  C)   Resp 18   Ht 1.448 m (4' 9\")   Wt 50.3 kg (111 lb)   SpO2 95%   BMI 24.02 kg/m    Exam:  GENERAL APPEARANCE:  Alert, in no distress, frail  EYES: no discharge or mattering on lids or lashes noted  ENT:  moist mucous membranes, hearing acuity intact  RESP: no respiratory distress,  patient is on room air  CV:   Edema none in bilateral lower extremities.  M/S:   Gait and station: wheelchair bound, able to move all extremities   NEURO: no facial asymmetry, no speech deficits and able to follow directions, moves all extremities symmetrically  PSYCH:  insight and judgement intact, memory intact, affect and mood flat.    Labs:   CBC RESULTS:   Recent Labs   Lab Test 03/05/20   HGB 10.0*       Last Basic Metabolic Panel:  Recent Labs   Lab Test 03/05/20      POTASSIUM 4.5   CHLORIDE 107   LUZ 9.6   CO2 28   BUN 47*   CR 1.80*   GLC 82       ASSESSMENT/PLAN:  (G20) Parkinsonism, unspecified " Parkinsonism type (H)    Comment: LE tremors and freezing episodes have subsided after starting the carvidopa.   Plan: Sinemet 10/100 BID.   --increase as needed.      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 Would like to taper of the trazadone but he declines at this time. Has component of paranoia and has not left his apartment since he moved into the facility in February.    Plan: mirtazapine 7.5 mg/HS   --citalopram 20 mg/day     --trazadone 25 mg BID     (I10) Essential hypertension  (N18.3) CKD (chronic kidney disease) stage 3, GFR 30-59 ml/min (H)  Comment: last cr of 1.8. no BP for review today.    Plan: losartan 25 mg/day  --CMP on next lab day.        (I25.9) Chronic ischemic heart disease  Comment: h/o MI, last EF 45% with inferior and inferolateral hypokinesis  Plan: remains on daily ASA and statin for secondary prevention.         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 Unable to obtain weight as Temo will not leave room to use the wheelchair scale. Will ask that staff bring in a scale to assist with obtaining a weight but unsure if he will be able to stand safely to step on the scale.   Plan:   --continue mirtazapine for appetite stimulation  --boost supplement one time per day.       (D51.9) Anemia due to vitamin B12 deficiency, unspecified B12 deficiency type  Comment: macrocytic anemia   Plan: continue B12 replacement  --recheck CBC on next lab day. Concern for decrease given malnutrition.    Updated daughter, Alexandrea no concerns of need for monitoring weight and nutrition. Also the need for dental follow up for broken tooth. Will connect with facility staff to get a care conference set up to discuss needs for Temo and Mariela.    Electronically signed by:  SYBIL Milton CNP       Sincerely,        SYBIL Milton CNP

## 2020-08-11 NOTE — PROGRESS NOTES
"[facility]  [place] Medical Record Number: [id number]  [facility] [[id number][facility]  Chief Complaint   Patient presents with     RECHECK       HPI:    Ben Salvador is a 89 year old (1/14/1928), who is being seen today for an episodic care visit. HPI information obtained from: facility chart records, facility staff, patient report, [facility] chart review and Care Everywhere [facility] chart review.    Mr. Salvador was visited today while in his room sitting in the wheelchair. He feels well and is happy that he no longer has the episodes of bilateral legs shaking. Denies anxiety; declines tapering off the morning trazadone, \"if it's not broken, why fix it?\" he has been sleeping well, often going to bed around 10 p.m and walking around 8. He has not been eating well due to food at the facility but also decreased appetite. He reports that he broke a tooth a couple of weeks ago while chewing meet. Denies pain or discomfort; declines going to see a dentist for evaluation.     Past Medical and Surgical History reviewed in Epic today.    MEDICATIONS:  Current Outpatient Medications   Medication Sig Dispense Refill     allopurinol (ZYLOPRIM) 100 MG tablet TAKE 1 TABLET BY MOUTH ONCE DAILY 30 tablet PRN     ASPIRIN LOW DOSE 81 MG chewable tablet CHEW AND SWALLOW ONE TABLET BY MOUTH ONCE DAILY 30 tablet PRN     atorvastatin (LIPITOR) 40 MG tablet TAKE 1 TABLET BY MOUTH ONCE DAILY 30 tablet PRN     carbidopa-levodopa (SINEMET)  MG tablet TAKE 1 TABLET BY MOUTH TWICE DAILY IN THE MORNING & IN THE EVENING 56 tablet PRN     citalopram (CELEXA) 20 MG tablet TAKE 1 TABLET BY MOUTH ONCE DAILY 28 tablet PRN     cyanocobalamin (VITAMIN B-12) 500 MCG tablet TAKE 1 TABLET BY MOUTH ONCE DAILY 30 tablet PRN     losartan (COZAAR) 25 MG tablet TAKE 1 TABLET BY MOUTH ONCE DAILY 30 tablet PRN     mirtazapine (REMERON) 7.5 MG tablet TAKE 1 TABLET BY MOUTH AT BEDTIME 31 " "tablet PRN     Multiple Vitamins-Minerals (CEROVITE SENIOR) TABS TAKE 1 TABLET BY MOUTH ONCE DAILY 30 tablet PRN     nitroGLYcerin (NITROSTAT) 0.4 MG sublingual tablet For chest pain place 1 tablet under the tongue every 5 minutes for 3 doses. If symptoms persist 5 minutes after 1st dose call 911.       polyethylene glycol (MIRALAX/GLYCOLAX) packet Take 8.5 g by mouth daily as needed for constipation       traZODone (DESYREL) 50 MG tablet Take 1/2 (25 mg) BY MOUTH TWICE DAILY;& ONCE DAILY AS NEEDED FOR ANXIETY 25 tablet PRN     triamcinolone (KENALOG) 0.1 % external ointment Apply topically daily as needed for irritation       VITAMIN D3 25 MCG (1000 UT) tablet TAKE TWO TABLETS (2000 UNITS) BY MOUTH ONCE DAILY  30 tablet PRN     REVIEW OF SYSTEMS:  4 point ROS including Respiratory, CV, GI and , other than that noted in the HPI,  is negative    Objective:  BP (!) 166/75   Pulse 66   Temp 97.6  F (36.4  C)   Resp 18   Ht 1.448 m (4' 9\")   Wt 50.3 kg (111 lb)   SpO2 95%   BMI 24.02 kg/m    Exam:  GENERAL APPEARANCE:  Alert, in no distress, frail  EYES: no discharge or mattering on lids or lashes noted  ENT:  moist mucous membranes, hearing acuity intact  RESP: no respiratory distress,  patient is on room air  CV:   Edema none in bilateral lower extremities.  M/S:   Gait and station: wheelchair bound, able to move all extremities   NEURO: no facial asymmetry, no speech deficits and able to follow directions, moves all extremities symmetrically  PSYCH:  insight and judgement intact, memory intact, affect and mood flat.    Labs:   CBC RESULTS:   Recent Labs   Lab Test 03/05/20   HGB 10.0*       Last Basic Metabolic Panel:  Recent Labs   Lab Test 03/05/20      POTASSIUM 4.5   CHLORIDE 107   LUZ 9.6   CO2 28   BUN 47*   CR 1.80*   GLC 82       ASSESSMENT/PLAN:  (G20) Parkinsonism, unspecified Parkinsonism type (H)    Comment: LE tremors and freezing episodes have subsided after starting the carvidopa. "   Plan: Sinemet 10/100 BID.   --increase as needed.      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 Would like to taper of the trazadone but he declines at this time. Has component of paranoia and has not left his apartment since he moved into the facility in February.    Plan: mirtazapine 7.5 mg/HS   --citalopram 20 mg/day     --trazadone 25 mg BID     (I10) Essential hypertension  (N18.3) CKD (chronic kidney disease) stage 3, GFR 30-59 ml/min (H)  Comment: last cr of 1.8. no BP for review today.    Plan: losartan 25 mg/day  --CMP on next lab day.        (I25.9) Chronic ischemic heart disease  Comment: h/o MI, last EF 45% with inferior and inferolateral hypokinesis  Plan: remains on daily ASA and statin for secondary prevention.         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 Unable to obtain weight as Temo will not leave room to use the wheelchair scale. Will ask that staff bring in a scale to assist with obtaining a weight but unsure if he will be able to stand safely to step on the scale.   Plan:   --continue mirtazapine for appetite stimulation  --boost supplement one time per day.       (D51.9) Anemia due to vitamin B12 deficiency, unspecified B12 deficiency type  Comment: macrocytic anemia   Plan: continue B12 replacement  --recheck CBC on next lab day. Concern for decrease given malnutrition.    Updated daughter, Alexandrea no concerns of need for monitoring weight and nutrition. Also the need for dental follow up for broken tooth. Will connect with facility staff to get a care conference set up to discuss needs for Temo and Mariela.    Electronically signed by:  SYBIL Milton CNP

## 2020-08-11 NOTE — LETTER
"    8/11/2020        RE: Ben Salvador  [facility]  [address] [facility] [address]        [facility]  [place] Medical Record Number: [id number]  Place of Service where encounter took place: [facility] ([facility] [[id number]]  Chief Complaint   Patient presents with     RECHECK       HPI:    Ben Salvador is a 89 year old (1/14/1928), who is being seen today for an episodic care visit. HPI information obtained from: facility chart records, facility staff, patient report, [facility] chart review and [facility] Everywhere [facility] chart review.    Mr. Salvador was visited today while in his room sitting in the wheelchair. He feels well and is happy that he no longer has the episodes of bilateral legs shaking. Denies anxiety; declines tapering off the morning trazadone, \"if it's not broken, why fix it?\" he has been sleeping well, often going to bed around 10 p.m and walking around 8. He has not been eating well due to food at the facility but also decreased appetite. He reports that he broke a tooth a couple of weeks ago while chewing meet. Denies pain or discomfort; declines going to see a dentist for evaluation.     Past Medical and Surgical History reviewed in Epic today.    MEDICATIONS:  Current Outpatient Medications   Medication Sig Dispense Refill     allopurinol (ZYLOPRIM) 100 MG tablet TAKE 1 TABLET BY MOUTH ONCE DAILY 30 tablet PRN     ASPIRIN LOW DOSE 81 MG chewable tablet CHEW AND SWALLOW ONE TABLET BY MOUTH ONCE DAILY 30 tablet PRN     atorvastatin (LIPITOR) 40 MG tablet TAKE 1 TABLET BY MOUTH ONCE DAILY 30 tablet PRN     carbidopa-levodopa (SINEMET)  MG tablet TAKE 1 TABLET BY MOUTH TWICE DAILY IN THE MORNING & IN THE EVENING 56 tablet PRN     citalopram (CELEXA) 20 MG tablet TAKE 1 TABLET BY MOUTH ONCE DAILY 28 tablet PRN     cyanocobalamin (VITAMIN B-12) 500 MCG tablet TAKE 1 TABLET BY MOUTH ONCE DAILY 30 tablet PRN     losartan (COZAAR) 25 MG tablet TAKE 1 TABLET " "BY MOUTH ONCE DAILY 30 tablet PRN     mirtazapine (REMERON) 7.5 MG tablet TAKE 1 TABLET BY MOUTH AT BEDTIME 31 tablet PRN     Multiple Vitamins-Minerals (CEROVITE SENIOR) TABS TAKE 1 TABLET BY MOUTH ONCE DAILY 30 tablet PRN     nitroGLYcerin (NITROSTAT) 0.4 MG sublingual tablet For chest pain place 1 tablet under the tongue every 5 minutes for 3 doses. If symptoms persist 5 minutes after 1st dose call 911.       polyethylene glycol (MIRALAX/GLYCOLAX) packet Take 8.5 g by mouth daily as needed for constipation       traZODone (DESYREL) 50 MG tablet Take 1/2 (25 mg) BY MOUTH TWICE DAILY;& ONCE DAILY AS NEEDED FOR ANXIETY 25 tablet PRN     triamcinolone (KENALOG) 0.1 % external ointment Apply topically daily as needed for irritation       VITAMIN D3 25 MCG (1000 UT) tablet TAKE TWO TABLETS (2000 UNITS) BY MOUTH ONCE DAILY ***NOTE DOSAGE/STRENGTH*** 30 tablet PRN     REVIEW OF SYSTEMS:  4 point ROS including Respiratory, CV, GI and , other than that noted in the HPI,  is negative    Objective:  BP (!) 166/75   Pulse 66   Temp 97.6  F (36.4  C)   Resp 18   Ht 1.448 m (4' 9\")   Wt 50.3 kg (111 lb)   SpO2 95%   BMI 24.02 kg/m    Exam:  GENERAL APPEARANCE:  Alert, in no distress, frail  EYES: no discharge or mattering on lids or lashes noted  ENT:  moist mucous membranes, hearing acuity intact  RESP: no respiratory distress,  patient is on room air  CV:   Edema none in bilateral lower extremities.  M/S:   Gait and station: wheelchair bound, able to move all extremities   NEURO: no facial asymmetry, no speech deficits and able to follow directions, moves all extremities symmetrically  PSYCH:  insight and judgement intact, memory intact, affect and mood flat.    Labs:   CBC RESULTS:   Recent Labs   Lab Test 03/05/20   HGB 10.0*       Last Basic Metabolic Panel:  Recent Labs   Lab Test 03/05/20      POTASSIUM 4.5   CHLORIDE 107   LUZ 9.6   CO2 28   BUN 47*   CR 1.80*   GLC 82       ASSESSMENT/PLAN:  (G20) " Parkinsonism, unspecified Parkinsonism type (H)    Comment: LE tremors and freezing episodes have subsided after starting the carvidopa.   Plan: Sinemet 10/100 BID.   --increase as needed.      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 Would like to taper of the trazadone but he declines at this time. Has component of paranoia and has not left his apartment since he moved into the facility in February.    Plan: mirtazapine 7.5 mg/HS   --citalopram 20 mg/day     --trazadone 25 mg BID     (I10) Essential hypertension  (N18.3) CKD (chronic kidney disease) stage 3, GFR 30-59 ml/min (H)  Comment: last cr of 1.8. no BP for review today.    Plan: losartan 25 mg/day  --CMP on next lab day.        (I25.9) Chronic ischemic heart disease  Comment: h/o MI, last EF 45% with inferior and inferolateral hypokinesis  Plan: remains on daily ASA and statin for secondary prevention.         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 Unable to obtain weight as Temo will not leave room to use the wheelchair scale. Will ask that staff bring in a scale to assist with obtaining a weight but unsure if he will be able to stand safely to step on the scale.   Plan:   --continue mirtazapine for appetite stimulation  --boost supplement one time per day.       (D51.9) Anemia due to vitamin B12 deficiency, unspecified B12 deficiency type  Comment: macrocytic anemia   Plan: continue B12 replacement  --recheck CBC on next lab day. Concern for decrease given malnutrition.    Updated daughter, Alexandrea no concerns of need for monitoring weight and nutrition. Also the need for dental follow up for broken tooth. Will connect with facility staff to get a care conference set up to discuss needs for Temo and Mariela.    Electronically signed by:  SYBIL Milton CNP       Sincerely,        Trinidad Cobian,  APRN CNP

## 2020-08-15 ENCOUNTER — RECORDS - HEALTHEAST (OUTPATIENT)
Dept: LAB | Facility: CLINIC | Age: 85
End: 2020-08-15

## 2020-08-17 ENCOUNTER — TRANSFERRED RECORDS (OUTPATIENT)
Dept: HEALTH INFORMATION MANAGEMENT | Facility: CLINIC | Age: 85
End: 2020-08-17

## 2020-08-17 LAB
ALBUMIN SERPL-MCNC: 3.2 G/DL (ref 3.5–5)
ALBUMIN SERPL-MCNC: 3.2 G/DL (ref 3.5–5)
ALP SERPL-CCNC: 122 U/L (ref 45–120)
ALP SERPL-CCNC: 122 U/L (ref 45–120)
ALT SERPL W P-5'-P-CCNC: <9 U/L (ref 0–45)
ALT SERPL-CCNC: <9 U/L (ref 0–45)
ANION GAP SERPL CALCULATED.3IONS-SCNC: 8 MMOL/L (ref 5–18)
ANION GAP SERPL CALCULATED.3IONS-SCNC: 8 MMOL/L (ref 5–18)
AST SERPL W P-5'-P-CCNC: 15 U/L (ref 0–40)
AST SERPL-CCNC: 15 U/L (ref 0–40)
BILIRUB SERPL-MCNC: 0.3 MG/DL (ref 0–1)
BILIRUB SERPL-MCNC: 0.3 MG/DL (ref 0–1)
BUN SERPL-MCNC: 57 MG/DL (ref 8–28)
BUN SERPL-MCNC: 57 MG/DL (ref 8–28)
CALCIUM SERPL-MCNC: 8.6 MG/DL (ref 8.5–10.5)
CALCIUM SERPL-MCNC: 8.6 MG/DL (ref 8.5–10.5)
CHLORIDE BLD-SCNC: 110 MMOL/L (ref 98–107)
CHLORIDE SERPLBLD-SCNC: 110 MMOL/L (ref 98–107)
CO2 SERPL-SCNC: 23 MMOL/L (ref 22–31)
CO2 SERPL-SCNC: 23 MMOL/L (ref 22–31)
CREAT SERPL-MCNC: 1.93 MG/DL (ref 0.7–1.3)
CREAT SERPL-MCNC: 1.93 MG/DL (ref 0.7–1.3)
ERYTHROCYTE [DISTWIDTH] IN BLOOD BY AUTOMATED COUNT: 14.8 % (ref 11–14.5)
ERYTHROCYTE [DISTWIDTH] IN BLOOD BY AUTOMATED COUNT: 14.8 % (ref 11–14.5)
GFR SERPL CREATININE-BSD FRML MDRD: 33 ML/MIN/1.73M2
GFR SERPL CREATININE-BSD FRML MDRD: 33 ML/MIN/1.73M2
GLUCOSE BLD-MCNC: 139 MG/DL (ref 70–125)
GLUCOSE SERPL-MCNC: 139 MG/DL (ref 70–125)
HCT VFR BLD AUTO: 30.4 % (ref 40–54)
HCT VFR BLD AUTO: 30.4 % (ref 40–54)
HEMOGLOBIN: 9.6 G/DL (ref 14–18)
HGB BLD-MCNC: 9.6 G/DL (ref 14–18)
MCH RBC QN AUTO: 30.2 PG (ref 27–34)
MCH RBC QN AUTO: 30.2 PG (ref 27–34)
MCHC RBC AUTO-ENTMCNC: 31.6 G/DL (ref 32–36)
MCHC RBC AUTO-ENTMCNC: 31.6 G/DL (ref 32–36)
MCV RBC AUTO: 96 FL (ref 80–100)
MCV RBC AUTO: 96 FL (ref 80–100)
PLATELET # BLD AUTO: 164 THOU/UL (ref 140–440)
PLATELET # BLD AUTO: 164 THOU/UL (ref 140–440)
PMV BLD AUTO: 11.3 FL (ref 8.5–12.5)
POTASSIUM BLD-SCNC: 5.4 MMOL/L (ref 3.5–5)
POTASSIUM SERPL-SCNC: 5.4 MMOL/L (ref 3.5–5)
PROT SERPL-MCNC: 5.8 G/DL (ref 6–8)
PROT SERPL-MCNC: 5.8 G/DL (ref 6–8)
RBC # BLD AUTO: 3.18 MILL/UL (ref 4.4–6.2)
RBC # BLD AUTO: 3.18 MILL/UL (ref 4.4–6.2)
SODIUM SERPL-SCNC: 141 MMOL/L (ref 136–145)
SODIUM SERPL-SCNC: 141 MMOL/L (ref 136–145)
WBC # BLD AUTO: 6.5 THOU/UL (ref 4–11)
WBC: 6.5 THOU/UL (ref 4–11)

## 2020-10-21 ENCOUNTER — ASSISTED LIVING VISIT (OUTPATIENT)
Dept: GERIATRICS | Facility: CLINIC | Age: 85
End: 2020-10-21
Payer: MEDICARE

## 2020-10-21 VITALS
BODY MASS INDEX: 21.79 KG/M2 | HEART RATE: 66 BPM | HEIGHT: 60 IN | WEIGHT: 111 LBS | OXYGEN SATURATION: 96 % | SYSTOLIC BLOOD PRESSURE: 166 MMHG | RESPIRATION RATE: 18 BRPM | DIASTOLIC BLOOD PRESSURE: 75 MMHG | TEMPERATURE: 97.4 F

## 2020-10-21 DIAGNOSIS — D51.9 ANEMIA DUE TO VITAMIN B12 DEFICIENCY, UNSPECIFIED B12 DEFICIENCY TYPE: ICD-10-CM

## 2020-10-21 DIAGNOSIS — G20.C PARKINSONISM, UNSPECIFIED PARKINSONISM TYPE (H): Primary | ICD-10-CM

## 2020-10-21 DIAGNOSIS — N18.32 STAGE 3B CHRONIC KIDNEY DISEASE (H): ICD-10-CM

## 2020-10-21 DIAGNOSIS — I10 ESSENTIAL HYPERTENSION: ICD-10-CM

## 2020-10-21 DIAGNOSIS — F33.1 MODERATE EPISODE OF RECURRENT MAJOR DEPRESSIVE DISORDER (H): ICD-10-CM

## 2020-10-21 DIAGNOSIS — E44.0 MODERATE PROTEIN MALNUTRITION (H): ICD-10-CM

## 2020-10-21 DIAGNOSIS — I25.9 CHRONIC ISCHEMIC HEART DISEASE: ICD-10-CM

## 2020-10-21 DIAGNOSIS — F41.1 GENERALIZED ANXIETY DISORDER: ICD-10-CM

## 2020-10-21 ASSESSMENT — MIFFLIN-ST. JEOR: SCORE: 953.37

## 2020-10-21 NOTE — PROGRESS NOTES
"[facility]  [place] Medical Record Number: [id number]  [facility] [[id number][facility]  Chief Complaint   Patient presents with     RECHECK     Wellness Visit       HPI:    Ben Salvador is a 89 year old (1/14/1928), who is being seen today for an episodic care visit. HPI information obtained from: facility chart records, facility staff, patient report and [facility] chart review.     Mr. Salvador was visited today while in his room sitting in the wheelchair. States that he has been doing well since starting the carvidopa in May. He recently had one episodes of his legs shaking last week but that is the first time since May. It was not as severe as the ones he was having prior. He is sleeping well. He does not eat well as he does not favor the food at the facility. There is no weight for review today but he looks slimmer. Daughter was going to bring in a scale from home to get a weight. Temo recently got a hair cut in the salon here at the assisted living. This is the first time that he left his apartment since he moved in in February 2020. When asked how he did, \"great.\" denied anxiety when he was out of apartment. Asked if he would start going to the dining room when it opens next month and he said, \"well, I don't know about that.\"     Annual Wellness Visit    Are you in the first 12 months of your Medicare Part B coverage?  No    Physical Health:    In general, how would you rate your overall physical health? good    Outside of work, how many days during the week do you exercise?none    Outside of work, approximately how many minutes a day do you exercise?not applicable    If you drink alcohol do you typically have >3 drinks per day or >7 drinks per week? Not Applicable    Do you usually eat at least 4 servings of fruit and vegetables a day, include whole grains & fiber and avoid regularly eating high fat or \"junk\" foods? No.     Do you have any " problems taking medications regularly? No    Do you have any side effects from medications? none    Needs assistance for the following daily activities: transportation, shopping, preparing meals, housework, bathing, laundry and money management    Which of the following safety concerns are present in your home?  none identified     Hearing impairment: No    In the past 6 months, have you been bothered by leaking of urine? no    Mental Health:    In general, how would you rate your overall mental or emotional health? good      PHQ-2 Score:       PHQ-2 ( 1999 [employer]) 10/23/2020   Q1: Little interest or pleasure in doing things 0   Q2: Feeling down, depressed or hopeless 0   PHQ-2 Score 0          Do you feel safe in your environment? Yes    Have you ever done Advance Care Planning? (For example, a Health Directive, POLST, or a discussion with a medical provider or your loved ones about your wishes)? Yes, advance care planning is on file.    Fall risk:  Fallen 2 or more times in the past year?: Yes  Any fall with injury in the past year?: No    Cognitive Screening: SLUMS 21/30    Do you have sleep apnea, excessive snoring or daytime drowsiness?: no    Current providers sharing in care for this patient include:   Patient Care Team:  Trinidad Cobian APRN CNP as PCP - General (Nurse Practitioner - Gerontology)  Trinidad Cobian APRN CNP as Assigned PCP  Yessenia Coffman MD as MD (Internal Medicine)  Cristine De Anda as Nursing Assistant (Gerontology)    SYBIL Milton CNP    Past Medical and Surgical History reviewed in Epic today.    MEDICATIONS:  Current Outpatient Medications   Medication Sig Dispense Refill     allopurinol (ZYLOPRIM) 100 MG tablet TAKE 1 TABLET BY MOUTH ONCE DAILY 30 tablet PRN     ASPIRIN LOW DOSE 81 MG chewable tablet CHEW AND SWALLOW ONE TABLET BY MOUTH ONCE DAILY 30 tablet PRN     atorvastatin (LIPITOR) 40 MG tablet TAKE 1 TABLET BY MOUTH ONCE DAILY 30 tablet PRN      "carbidopa-levodopa (SINEMET)  MG tablet TAKE 1 TABLET BY MOUTH TWICE DAILY IN THE MORNING & IN THE EVENING 56 tablet PRN     citalopram (CELEXA) 20 MG tablet TAKE 1 TABLET BY MOUTH ONCE DAILY 28 tablet PRN     cyanocobalamin (VITAMIN B-12) 500 MCG tablet TAKE 1 TABLET BY MOUTH ONCE DAILY 30 tablet PRN     mirtazapine (REMERON) 7.5 MG tablet TAKE 1 TABLET BY MOUTH AT BEDTIME 31 tablet PRN     Multiple Vitamins-Minerals (CEROVITE SENIOR) TABS TAKE 1 TABLET BY MOUTH ONCE DAILY 30 tablet PRN     nitroGLYcerin (NITROSTAT) 0.4 MG sublingual tablet For chest pain place 1 tablet under the tongue every 5 minutes for 3 doses. If symptoms persist 5 minutes after 1st dose call 911.       polyethylene glycol (MIRALAX/GLYCOLAX) packet Take 8.5 g by mouth daily as needed for constipation       traZODone (DESYREL) 50 MG tablet Take 1/2 (25 mg) BY MOUTH TWICE DAILY;& ONCE DAILY AS NEEDED FOR ANXIETY 25 tablet PRN     triamcinolone (KENALOG) 0.1 % external ointment Apply topically daily as needed for irritation       VITAMIN D3 25 MCG (1000 UT) tablet TAKE TWO TABLETS (2000 UNITS) BY MOUTH ONCE DAILY  30 tablet PRN     REVIEW OF SYSTEMS:  4 point ROS including Respiratory, CV, GI and , other than that noted in the HPI,  is negative    Objective:  BP (!) 166/75   Pulse 66   Temp 97.4  F (36.3  C)   Resp 18   Ht 1.448 m (4' 9\")   Wt 50.3 kg (111 lb)   SpO2 96%   BMI 24.02 kg/m    Exam:  GENERAL APPEARANCE:  Alert, in no distress  EYES: no discharge or mattering on lids or lashes noted  ENT:  moist mucous membranes, hearing acuity intact  RESP: no respiratory distress, lungs are clear, patient is on room air  CV:  regular rate and rhythm, Edema none in bilateral lower extremities.  M/S:   Gait and station: wheelchair bound, able to move all extremities   NEURO: no facial asymmetry, no speech deficits and able to follow directions, moves all extremities symmetrically  PSYCH:  insight and judgement intact, memory intact, " affect and mood normal    Labs:   CBC RESULTS:   Recent Labs   Lab Test 08/17/20 03/05/20   WBC 6.5  --    RBC 3.18*  --    HGB 9.6* 10.0*   HCT 30.4*  --    MCV 96  --    MCH 30.2  --    MCHC 31.6*  --    RDW 14.8*  --      --        Last Basic Metabolic Panel:  Recent Labs   Lab Test 08/17/20 03/05/20    142   POTASSIUM 5.4* 4.5   CHLORIDE 110* 107   LUZ 8.6 9.6   CO2 23 28   BUN 57* 47*   CR 1.93* 1.80*   * 82       Liver Function Studies -   Recent Labs   Lab Test 08/17/20   PROTTOTAL 5.8*   ALBUMIN 3.2*   BILITOTAL 0.3   ALKPHOS 122*   AST 15   ALT <9       ASSESSMENT/PLAN:  (G20) Parkinsonism, unspecified Parkinsonism type (H)    Comment: LE tremors and freezing episodes have subsided after starting the carvidopa.   Plan: Sinemet 10/100 BID.   --increase as needed.      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 Has component of paranoia and has not left his apartment since he moved into the facility in February with the exception of getting his hair cut.   Plan: mirtazapine 7.5 mg/HS   --citalopram 20 mg/day     --trazadone 25 mg BID     (I10) Essential hypertension  (N18.3) CKD (chronic kidney disease) stage 3, GFR 30-59 ml/min (H)  Comment: last cr of 1.8 which is baseline. BP soft today 110's so will discontinue losartan.  Plan: discontinue losartan  --recheck BMP on 10/26     (I25.9) Chronic ischemic heart disease  Comment: h/o MI, last EF 45% with inferior and inferolateral hypokinesis. euvolemic on exam today.   Plan: remains on daily ASA and statin for secondary prevention.         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 Unable to obtain weight as Temo will not leave room to use the wheelchair scale. Daughter was to bring in scale for weight but not present in room today.   Plan:   --continue mirtazapine for appetite  stimulation  --boost supplement one time per day.       (D51.9) Anemia due to vitamin B12 deficiency, unspecified B12 deficiency type  Comment: macrocytic anemia. HGB   Plan: continue B12 replacement  --recheck HGB on 10/26      Electronically signed by:  SYBIL Milton CNP

## 2020-10-21 NOTE — LETTER
"    10/21/2020        RE: Ben Salvador  C/o Torin Salvador  [address] [facility] [address]        [facility]  [place] Medical Record Number: [id number]  Place of Service where encounter took place: [facility] ([facility] [[id number]]  Chief Complaint   Patient presents with     RECHECK     Wellness Visit       HPI:    Ben Salvador is a 89 year old (1/14/1928), who is being seen today for an episodic care visit. HPI information obtained from: facility chart records, facility staff, patient report and [facility] chart review.     Mr. Salvador was visited today while in his room sitting in the wheelchair. States that he has been doing well since starting the carvidopa in May. He recently had one episodes of his legs shaking last week but that is the first time since May. It was not as severe as the ones he was having prior. He is sleeping well. He does not eat well as he does not favor the food at the facility. There is no weight for review today but he looks slimmer. Daughter was going to bring in a scale from home to get a weight. Temo recently got a hair cut in the salon here at the assisted living. This is the first time that he left his apartment since he moved in in February 2020. When asked how he did, \"great.\" denied anxiety when he was out of apartment. Asked if he would start going to the dining room when it opens next month and he said, \"well, I don't know about that.\"     Annual Wellness Visit    Are you in the first 12 months of your Medicare Part B coverage?  No    Physical Health:    In general, how would you rate your overall physical health? good    Outside of work, how many days during the week do you exercise?none    Outside of work, approximately how many minutes a day do you exercise?not applicable    If you drink alcohol do you typically have >3 drinks per day or >7 drinks per week? Not Applicable    Do you usually eat at least 4 servings of fruit and vegetables a " "day, include whole grains & fiber and avoid regularly eating high fat or \"junk\" foods? No.     Do you have any problems taking medications regularly? No    Do you have any side effects from medications? none    Needs assistance for the following daily activities: transportation, shopping, preparing meals, housework, bathing, laundry and money management    Which of the following safety concerns are present in your home?  none identified     Hearing impairment: No    In the past 6 months, have you been bothered by leaking of urine? no    Mental Health:    In general, how would you rate your overall mental or emotional health? good      PHQ-2 Score:       PHQ-2 ( 1999 Pfizer) 10/23/2020   Q1: Little interest or pleasure in doing things 0   Q2: Feeling down, depressed or hopeless 0   PHQ-2 Score 0          Do you feel safe in your environment? Yes    Have you ever done Advance Care Planning? (For example, a Health Directive, POLST, or a discussion with a medical provider or your loved ones about your wishes)? Yes, advance care planning is on file.    Fall risk:  Fallen 2 or more times in the past year?: Yes  Any fall with injury in the past year?: No    Cognitive Screening: SLUMS 21/30    Do you have sleep apnea, excessive snoring or daytime drowsiness?: no    Current providers sharing in care for this patient include:   Patient Care Team:  Trinidad Cobian APRN CNP as PCP - General (Nurse Practitioner - Gerontology)  Trinidad Cobian APRN CNP as Assigned PCP  Yessenia Coffman MD as MD (Internal Medicine)  Cristine De Anda as Nursing Assistant (Gerontology)    SYBIL Milton CNP    Past Medical and Surgical History reviewed in Epic today.    MEDICATIONS:  Current Outpatient Medications   Medication Sig Dispense Refill     allopurinol (ZYLOPRIM) 100 MG tablet TAKE 1 TABLET BY MOUTH ONCE DAILY 30 tablet PRN     ASPIRIN LOW DOSE 81 MG chewable tablet CHEW AND SWALLOW ONE TABLET BY MOUTH ONCE DAILY 30 " "tablet PRN     atorvastatin (LIPITOR) 40 MG tablet TAKE 1 TABLET BY MOUTH ONCE DAILY 30 tablet PRN     carbidopa-levodopa (SINEMET)  MG tablet TAKE 1 TABLET BY MOUTH TWICE DAILY IN THE MORNING & IN THE EVENING 56 tablet PRN     citalopram (CELEXA) 20 MG tablet TAKE 1 TABLET BY MOUTH ONCE DAILY 28 tablet PRN     cyanocobalamin (VITAMIN B-12) 500 MCG tablet TAKE 1 TABLET BY MOUTH ONCE DAILY 30 tablet PRN     mirtazapine (REMERON) 7.5 MG tablet TAKE 1 TABLET BY MOUTH AT BEDTIME 31 tablet PRN     Multiple Vitamins-Minerals (CEROVITE SENIOR) TABS TAKE 1 TABLET BY MOUTH ONCE DAILY 30 tablet PRN     nitroGLYcerin (NITROSTAT) 0.4 MG sublingual tablet For chest pain place 1 tablet under the tongue every 5 minutes for 3 doses. If symptoms persist 5 minutes after 1st dose call 911.       polyethylene glycol (MIRALAX/GLYCOLAX) packet Take 8.5 g by mouth daily as needed for constipation       traZODone (DESYREL) 50 MG tablet Take 1/2 (25 mg) BY MOUTH TWICE DAILY;& ONCE DAILY AS NEEDED FOR ANXIETY 25 tablet PRN     triamcinolone (KENALOG) 0.1 % external ointment Apply topically daily as needed for irritation       VITAMIN D3 25 MCG (1000 UT) tablet TAKE TWO TABLETS (2000 UNITS) BY MOUTH ONCE DAILY  30 tablet PRN     REVIEW OF SYSTEMS:  4 point ROS including Respiratory, CV, GI and , other than that noted in the HPI,  is negative    Objective:  BP (!) 166/75   Pulse 66   Temp 97.4  F (36.3  C)   Resp 18   Ht 1.448 m (4' 9\")   Wt 50.3 kg (111 lb)   SpO2 96%   BMI 24.02 kg/m    Exam:  GENERAL APPEARANCE:  Alert, in no distress  EYES: no discharge or mattering on lids or lashes noted  ENT:  moist mucous membranes, hearing acuity intact  RESP: no respiratory distress, lungs are clear, patient is on room air  CV:  regular rate and rhythm, Edema none in bilateral lower extremities.  M/S:   Gait and station: wheelchair bound, able to move all extremities   NEURO: no facial asymmetry, no speech deficits and able to follow " directions, moves all extremities symmetrically  PSYCH:  insight and judgement intact, memory intact, affect and mood normal    Labs:   CBC RESULTS:   Recent Labs   Lab Test 08/17/20 03/05/20   WBC 6.5  --    RBC 3.18*  --    HGB 9.6* 10.0*   HCT 30.4*  --    MCV 96  --    MCH 30.2  --    MCHC 31.6*  --    RDW 14.8*  --      --        Last Basic Metabolic Panel:  Recent Labs   Lab Test 08/17/20 03/05/20    142   POTASSIUM 5.4* 4.5   CHLORIDE 110* 107   LUZ 8.6 9.6   CO2 23 28   BUN 57* 47*   CR 1.93* 1.80*   * 82       Liver Function Studies -   Recent Labs   Lab Test 08/17/20   PROTTOTAL 5.8*   ALBUMIN 3.2*   BILITOTAL 0.3   ALKPHOS 122*   AST 15   ALT <9       ASSESSMENT/PLAN:  (G20) Parkinsonism, unspecified Parkinsonism type (H)    Comment: LE tremors and freezing episodes have subsided after starting the carvidopa.   Plan: Sinemet 10/100 BID.   --increase as needed.      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 Has component of paranoia and has not left his apartment since he moved into the facility in February with the exception of getting his hair cut.   Plan: mirtazapine 7.5 mg/HS   --citalopram 20 mg/day     --trazadone 25 mg BID     (I10) Essential hypertension  (N18.3) CKD (chronic kidney disease) stage 3, GFR 30-59 ml/min (H)  Comment: last cr of 1.8 which is baseline. BP soft today 110's so will discontinue losartan.  Plan: discontinue losartan  --recheck BMP on 10/26     (I25.9) Chronic ischemic heart disease  Comment: h/o MI, last EF 45% with inferior and inferolateral hypokinesis. euvolemic on exam today.   Plan: remains on daily ASA and statin for secondary prevention.         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 Unable to obtain weight as Temo will not leave room to use the wheelchair scale. Daughter was to bring in scale for  weight but not present in room today.   Plan:   --continue mirtazapine for appetite stimulation  --boost supplement one time per day.       (D51.9) Anemia due to vitamin B12 deficiency, unspecified B12 deficiency type  Comment: macrocytic anemia. HGB   Plan: continue B12 replacement  --recheck HGB on 10/26      Electronically signed by:  SYBIL Milton CNP         Sincerely,        SYBIL Milton CNP

## 2020-10-24 ENCOUNTER — RECORDS - HEALTHEAST (OUTPATIENT)
Dept: LAB | Facility: CLINIC | Age: 85
End: 2020-10-24

## 2020-10-26 ENCOUNTER — TRANSFERRED RECORDS (OUTPATIENT)
Dept: HEALTH INFORMATION MANAGEMENT | Facility: CLINIC | Age: 85
End: 2020-10-26

## 2020-10-26 LAB
ANION GAP SERPL CALCULATED.3IONS-SCNC: 4 MMOL/L (ref 5–18)
ANION GAP SERPL CALCULATED.3IONS-SCNC: 4 MMOL/L (ref 5–18)
BUN SERPL-MCNC: 56 MG/DL (ref 8–28)
BUN SERPL-MCNC: 56 MG/DL (ref 8–28)
CALCIUM SERPL-MCNC: 8.9 MG/DL (ref 8.5–10.5)
CALCIUM SERPL-MCNC: 8.9 MG/DL (ref 8.5–10.5)
CHLORIDE BLD-SCNC: 108 MMOL/L (ref 98–107)
CHLORIDE SERPLBLD-SCNC: 108 MMOL/L (ref 98–107)
CO2 SERPL-SCNC: 29 MMOL/L (ref 22–31)
CO2 SERPL-SCNC: 29 MMOL/L (ref 22–31)
CREAT SERPL-MCNC: 1.91 MG/DL (ref 0.7–1.3)
CREAT SERPL-MCNC: 1.91 MG/DL (ref 0.7–1.3)
GFR SERPL CREATININE-BSD FRML MDRD: 33 ML/MIN/1.73M2
GFR SERPL CREATININE-BSD FRML MDRD: 33 ML/MIN/1.73M2
GLUCOSE BLD-MCNC: 123 MG/DL (ref 70–125)
GLUCOSE SERPL-MCNC: 123 MG/DL (ref 70–125)
HEMOGLOBIN: 10.2 G/DL (ref 14–18)
HGB BLD-MCNC: 10.2 G/DL (ref 14–18)
POTASSIUM BLD-SCNC: 5.5 MMOL/L (ref 3.5–5)
POTASSIUM SERPL-SCNC: 5.5 MMOL/L (ref 3.5–5)
SODIUM SERPL-SCNC: 141 MMOL/L (ref 136–145)
SODIUM SERPL-SCNC: 141 MMOL/L (ref 136–145)

## 2021-01-08 DIAGNOSIS — R21 RASH: Primary | ICD-10-CM

## 2021-01-08 RX ORDER — TRIAMCINOLONE ACETONIDE 1 MG/G
OINTMENT TOPICAL
Qty: 80 G | Refills: 97 | Status: SHIPPED | OUTPATIENT
Start: 2021-01-08 | End: 2021-05-13

## 2021-01-13 ENCOUNTER — ASSISTED LIVING VISIT (OUTPATIENT)
Dept: GERIATRICS | Facility: CLINIC | Age: 86
End: 2021-01-13
Payer: MEDICARE

## 2021-01-13 VITALS — TEMPERATURE: 97.8 F | OXYGEN SATURATION: 97 %

## 2021-01-13 DIAGNOSIS — I25.9 CHRONIC ISCHEMIC HEART DISEASE: ICD-10-CM

## 2021-01-13 DIAGNOSIS — F41.1 GENERALIZED ANXIETY DISORDER: ICD-10-CM

## 2021-01-13 DIAGNOSIS — D51.9 ANEMIA DUE TO VITAMIN B12 DEFICIENCY, UNSPECIFIED B12 DEFICIENCY TYPE: ICD-10-CM

## 2021-01-13 DIAGNOSIS — R63.4 WEIGHT LOSS: ICD-10-CM

## 2021-01-13 DIAGNOSIS — G20.C PARKINSONISM, UNSPECIFIED PARKINSONISM TYPE (H): Primary | ICD-10-CM

## 2021-01-13 DIAGNOSIS — F41.0 PANIC ATTACK: ICD-10-CM

## 2021-01-13 DIAGNOSIS — N18.32 STAGE 3B CHRONIC KIDNEY DISEASE (H): ICD-10-CM

## 2021-01-13 NOTE — LETTER
1/13/2021        RE: Ben Salvador  C/o Torin Salvador  [address] [facility] [address]        Bne Salvador is a 89 year old male seen January 13, 2021 at [facility] where he has resided for one year (admit 2/2020) seen to follow up anxiety and Parkinsonism.     Pt is seen in his apartment, up to  with his wife present. He reports feeling okay, but still very little appetite and does not find the AL food appealing.    No longer ambulatory, shaking episodes have resolved completely on low dose Sinemet.    Has been out of the apartment for a hair cut, but not otherwise      By chart review, patient had a [facility] admission in December 2019 for recurrent falls and worsened anxiety.   He had been taking lorazepam at home for the anxiety, worsening the falls, and this was tapered off.  He transitioned to [facility] where his anxiety, panic attacks and fear of falling impaired his ability to progress with therapies.    Mirtazapine started and he was followed by the Psychologist.  By the end of his month-long stay he was able to ambulate 300' with FWW and min assist, independent with transfers.   Since AL admission he has not tried walking and has declined [facility] PHYSICAL THERAPY / OCCUPATIONAL THERAPY, retains disabling fear of falling.             Pt has been primary caregiver for his wife Mariela, but not eating well, losing weight and getting weaker.   After hospitalization their family determined they need more support and have moved them to AL for permanent placement.       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  They previously lived in their home of 66 years, in [place].  They have a son Torin and daughter Dixie.     Non  smoker    ROS:  Tinetti 18/28    SLUMS 25/30  Continued poor appetite and weight loss; weight 111 lbs.      EXAM: up to WC, NAD  Temp 97.8  F (36.6  C)   SpO2 97%    VS at today's visit:  /72  HR 72  O2sat 99% on room air  Neck supple without adenopathy  Lungs clear bilaterally with fair air movement  Heart RRR s1s2 @70  Abd soft, NT, no distention, +BS  Ext without edema  Neuro: accurate historian, LE weakness  Psych: affect okay    Last Comprehensive Metabolic Panel:  Sodium   Date Value Ref Range Status   10/26/2020 141 136 - 145 mmol/L Final     Potassium   Date Value Ref Range Status   10/26/2020 5.5 (A) 3.5 - 5.0 mmol/L Final     Chloride   Date Value Ref Range Status   10/26/2020 108 (A) 98 - 107 mmol/L Final     Carbon Dioxide   Date Value Ref Range Status   10/26/2020 29 22 - 31 mmol/L Final     Anion Gap   Date Value Ref Range Status   10/26/2020 4 (A) 5 - 18 mmol/L Final     Glucose   Date Value Ref Range Status   10/26/2020 123 (A) 70 - 125 mg/dL Final     Urea Nitrogen   Date Value Ref Range Status   10/26/2020 56 (A) 8 - 28 mg/dL Final     Creatinine   Date Value Ref Range Status   10/26/2020 1.91 (A) 0.70 - 1.30 mg/dL Final     GFR Estimate   Date Value Ref Range Status   10/26/2020 33 (A) >60 ml/min/1.73m2 Final     Calcium   Date Value Ref Range Status   10/26/2020 8.9 8.5 - 10.5 mg/dL Final     Lab Results   Component Value Date    ALBUMIN 3.2 08/17/2020     Lab Results   Component Value Date    HGB 10.2 10/26/2020      MCV 96 08/17/2020          IMP/PLAN:    (G20) Parkinsonism, unspecified Parkinsonism type (H)    Comment: LE tremors, freezing resolved on low dose Sinemet  Plan: Sinemet 10/100 bid.      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  Plan: mirtazapine 7.5 mg/HS, trazodone 25 mg bid and citalopram 20 mg/day         (I10) Essential hypertension  (N18.32) Stage 3b chronic kidney disease  Comment: GFR 33, good bp control    Plan: no Rx currently, follow bps and BMP     Renal dosing of all medications     (I25.9) Chronic ischemic heart disease  Comment: h/o MI, last EF 45% with inferior and inferolateral hypokinesis  Plan: daily ASA and atorvastatin 40 mg/day for secondary prevention.         (R63.4) Weight loss  Comment: preceded admission and has continued; albumin 3.2, thin and frail  Plan: some improvement in AL, continue mirtazapine with hopes to boost appetite.    Nutritional supplements prn     (M62.81) Generalized muscle weakness  Comment: not ambulatory and needs assist for all ADLs     Plan: he remains uninterested in [facility] PHYSICAL THERAPY     Continue vit D, dietary calcium        (D51.9) Anemia due to vitamin B12 deficiency, unspecified B12 deficiency type  Comment: macrocytic anemia   Plan: continue B12 replacement     Yessenia Coffman MD         Sincerely,        Yessenia Coffman MD

## 2021-01-24 NOTE — PROGRESS NOTES
Ben Salvador is a 89 year old male seen January 13, 2021 at [facility] where he has resided for one year (admit 2/2020) seen to follow up anxiety and Parkinsonism.     Pt is seen in his apartment, up to WC with his wife present. He reports feeling okay, but still very little appetite and does not find the AL food appealing.    No longer ambulatory, shaking episodes have resolved completely on low dose Sinemet.    Has been out of the apartment for a hair cut, but not otherwise      By chart review, patient had a [facility] admission in December 2019 for recurrent falls and worsened anxiety.   He had been taking lorazepam at home for the anxiety, worsening the falls, and this was tapered off.  He transitioned to [facility] where his anxiety, panic attacks and fear of falling impaired his ability to progress with therapies.    Mirtazapine started and he was followed by the Psychologist.  By the end of his month-long stay he was able to ambulate 300' with FWW and min assist, independent with transfers.   Since AL admission he has not tried walking and has declined [facility] PHYSICAL THERAPY / OCCUPATIONAL THERAPY, retains disabling fear of falling.             Pt has been primary caregiver for his wife Mariela, but not eating well, losing weight and getting weaker.   After hospitalization their family determined they need more support and have moved them to AL for permanent placement.       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  They previously lived in their home of 66 years, in [place].  They have a son Torin and daughter Dixie.     Non smoker    ROS:  Tinetti 18/28    SLUMS 25/30  Continued poor appetite and weight loss; weight 111 lbs.       EXAM: up to WC, NAD  Temp 97.8  F (36.6  C)   SpO2 97%    VS at today's visit:  /72  HR 72  O2sat 99% on room air  Neck supple without adenopathy  Lungs clear bilaterally with fair air movement  Heart RRR s1s2 @70  Abd soft, NT, no distention, +BS  Ext without edema  Neuro: accurate historian, LE weakness  Psych: affect okay    Last Comprehensive Metabolic Panel:  Sodium   Date Value Ref Range Status   10/26/2020 141 136 - 145 mmol/L Final     Potassium   Date Value Ref Range Status   10/26/2020 5.5 (A) 3.5 - 5.0 mmol/L Final     Chloride   Date Value Ref Range Status   10/26/2020 108 (A) 98 - 107 mmol/L Final     Carbon Dioxide   Date Value Ref Range Status   10/26/2020 29 22 - 31 mmol/L Final     Anion Gap   Date Value Ref Range Status   10/26/2020 4 (A) 5 - 18 mmol/L Final     Glucose   Date Value Ref Range Status   10/26/2020 123 (A) 70 - 125 mg/dL Final     Urea Nitrogen   Date Value Ref Range Status   10/26/2020 56 (A) 8 - 28 mg/dL Final     Creatinine   Date Value Ref Range Status   10/26/2020 1.91 (A) 0.70 - 1.30 mg/dL Final     GFR Estimate   Date Value Ref Range Status   10/26/2020 33 (A) >60 ml/min/1.73m2 Final     Calcium   Date Value Ref Range Status   10/26/2020 8.9 8.5 - 10.5 mg/dL Final     Lab Results   Component Value Date    ALBUMIN 3.2 08/17/2020     Lab Results   Component Value Date    HGB 10.2 10/26/2020      MCV 96 08/17/2020          IMP/PLAN:    (G20) Parkinsonism, unspecified Parkinsonism type (H)    Comment: LE tremors, freezing resolved on low dose Sinemet  Plan: Sinemet 10/100 bid.      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  Plan: mirtazapine 7.5 mg/HS, trazodone 25 mg bid and citalopram 20 mg/day        (I10) Essential hypertension  (N18.32) Stage 3b chronic kidney disease  Comment: GFR 33, good bp  control    Plan: no Rx currently, follow bps and BMP     Renal dosing of all medications     (I25.9) Chronic ischemic heart disease  Comment: h/o MI, last EF 45% with inferior and inferolateral hypokinesis  Plan: daily ASA and atorvastatin 40 mg/day for secondary prevention.         (R63.4) Weight loss  Comment: preceded admission and has continued; albumin 3.2, thin and frail  Plan: some improvement in AL, continue mirtazapine with hopes to boost appetite.    Nutritional supplements prn     (M62.81) Generalized muscle weakness  Comment: not ambulatory and needs assist for all ADLs     Plan: he remains uninterested in [facility] PHYSICAL THERAPY     Continue vit D, dietary calcium        (D51.9) Anemia due to vitamin B12 deficiency, unspecified B12 deficiency type  Comment: macrocytic anemia   Plan: continue B12 replacement     Yessenia Coffman MD

## 2021-01-28 DIAGNOSIS — I25.9 CHRONIC ISCHEMIC HEART DISEASE: Primary | ICD-10-CM

## 2021-01-28 RX ORDER — NITROGLYCERIN 0.4 MG/1
TABLET SUBLINGUAL
Qty: 25 TABLET | Refills: 97 | Status: SHIPPED | OUTPATIENT
Start: 2021-01-28

## 2021-02-01 NOTE — PROGRESS NOTES
"[place] [facility]  [place] Medical Record Number: [id number]  [facility][id number]  Chief Complaint   Patient presents with     RECHECK       HPI:    Ben Salvador is a 89 year old (1/14/1928), who is being seen today for an episodic care visit. HPI information obtained from: facility chart records, facility staff, patient report and [facility] chart review.    Received a phone call from nursing on 1/27/21 that Temo had a painful toe that was red and warm, there were no open areas at that time. Doxycycline was ordered on 1/27, started on 1/29. Message on bridge from 1/30: \"RN noted a small amount of serosang/pus-like drainage and a small pin point open area at the base of the nail. Cleansed with Dermal Wound Cleanser, applied bacitracin ointment, non-stick pad and wrapped with antonio and tape.\"    Temo was visited today while in his room eating breakfast. He report the pain is present in which he is taking the PRN tylenol which is helpful. He does not think the toe is getting better. Reports that it is currently wrapped and that it is painful when the shoe is placed on and off. We discussed using no shoe and just the slipper sock but he prefers to have his shoe on so he does not slip while toileting himself. He would like to have tylenol scheduled for the next week while the toe is healing.     Past Medical and Surgical History reviewed in Epic today.    MEDICATIONS:    Current Outpatient Medications   Medication Sig Dispense Refill     allopurinol (ZYLOPRIM) 100 MG tablet TAKE 1 TABLET BY MOUTH ONCE DAILY 30 tablet PRN     ASPIRIN LOW DOSE 81 MG chewable tablet CHEW AND SWALLOW ONE TABLET BY MOUTH ONCE DAILY 30 tablet PRN     atorvastatin (LIPITOR) 40 MG tablet TAKE 1 TABLET BY MOUTH ONCE DAILY 30 tablet PRN     carbidopa-levodopa (SINEMET)  MG tablet TAKE 1 TABLET BY MOUTH TWICE DAILY IN THE MORNING & IN THE EVENING 56 tablet PRN     " "citalopram (CELEXA) 20 MG tablet TAKE 1 TABLET BY MOUTH ONCE DAILY 28 tablet PRN     cyanocobalamin (VITAMIN B-12) 500 MCG tablet TAKE 1 TABLET BY MOUTH ONCE DAILY 30 tablet PRN     mirtazapine (REMERON) 7.5 MG tablet TAKE 1 TABLET BY MOUTH AT BEDTIME 31 tablet PRN     Multiple Vitamins-Minerals (CEROVITE SENIOR) TABS TAKE 1 TABLET BY MOUTH ONCE DAILY 30 tablet PRN     nitroGLYcerin (NITROSTAT) 0.4 MG sublingual tablet PLACE 1 TABLET UNDER TONGUE AT ONSET OF CHEST PAIN. MAY REPEAT EVERY 5 MINUTES AS NEEDED X 3 DOSES 25 tablet 97     polyethylene glycol (MIRALAX/GLYCOLAX) packet Take 8.5 g by mouth daily as needed for constipation       traZODone (DESYREL) 50 MG tablet Take 1/2 (25 mg) BY MOUTH TWICE DAILY;& ONCE DAILY AS NEEDED FOR ANXIETY 25 tablet PRN     triamcinolone (KENALOG) 0.1 % external ointment APPLY TOPICALLY TO LOWER EXTREMITY RASH TWICE DAILY AS NEEDED 80 g 97     VITAMIN D3 25 MCG (1000 UT) tablet TAKE TWO TABLETS (2000 UNITS) BY MOUTH ONCE DAILY  30 tablet PRN     REVIEW OF SYSTEMS:  4 point ROS including Respiratory, CV, GI and , other than that noted in the HPI,  is negative    Objective:  BP (!) 166/75   Pulse 56   Temp 96.2  F (35.7  C)   Resp 18   Ht 1.448 m (4' 9\")   Wt 50.3 kg (111 lb)   SpO2 97%   BMI 24.02 kg/m    Exam:  GENERAL APPEARANCE:  Alert, in no distress  EYES: no discharge or mattering on lids or lashes noted  ENT:  moist mucous membranes, hearing acuity intact  RESP: no respiratory distress, patient is on room air  CV: Edema none in bilateral lower extremities.  M/S:   Gait and station: wheelchair bound, able to move all extremities   NEURO: no facial asymmetry, no speech deficits and able to follow directions, moves all extremities symmetrically  SKIN:  Left toe with small open area at the base of toe nail. Scant amount of serousangenous drainage. Surrounding area is pink, no swelling or warmth.   PSYCH:  insight and judgement intact, memory intact, affect and mood " normal    Labs:   reviewed    ASSESSMENT/PLAN:  (L03.032) Cellulitis of toe of left foot  (primary encounter diagnosis)  Comment: started without an open area but did eventually open up and start draining pus like drainage. He was started on doxycycline given underlying CKD and that appears to be working as the surrounding area is no longer red, hot or with swelling. Now that he has the open area, would like to extend the course of abx to a 7 day course.  Plan:   --extend doxy course to 7 days which will be completed on 2/5.   --monitor the open area on the toe and if does not improve, may need to have home care eval and treat  --change tylenol to 650 mg BID and BID PRN x 7 days then return back to previous tylenol order of 650 mg q6h PRN thereafter  --to see podiatry when they come on site    (N18.32) Stage 3b chronic kidney disease  Comment: baseline creatinine of 1.8-1.9.   Plan: Avoid nephrotoxic medications. Recheck BMP on next lab day.     Electronically signed by:  SYBIL Milton CNP

## 2021-02-02 ENCOUNTER — ASSISTED LIVING VISIT (OUTPATIENT)
Dept: GERIATRICS | Facility: CLINIC | Age: 86
End: 2021-02-02
Payer: MEDICARE

## 2021-02-02 VITALS
WEIGHT: 111 LBS | HEIGHT: 60 IN | OXYGEN SATURATION: 97 % | BODY MASS INDEX: 21.79 KG/M2 | TEMPERATURE: 96.2 F | DIASTOLIC BLOOD PRESSURE: 75 MMHG | SYSTOLIC BLOOD PRESSURE: 166 MMHG | HEART RATE: 56 BPM | RESPIRATION RATE: 18 BRPM

## 2021-02-02 DIAGNOSIS — L03.032 CELLULITIS OF TOE OF LEFT FOOT: Primary | ICD-10-CM

## 2021-02-02 DIAGNOSIS — N18.32 STAGE 3B CHRONIC KIDNEY DISEASE (H): ICD-10-CM

## 2021-02-02 ASSESSMENT — MIFFLIN-ST. JEOR: SCORE: 948.37

## 2021-02-02 NOTE — LETTER
"    2/2/2021        RE: Ben Salvador  C/o Torin Salvador  [address] [facility] [address]        [place] [facility]  [place] Medical Record Number: [id number]  Place of Service where encounter took place: [facility] ([facility] [[id number]]  Chief Complaint   Patient presents with     RECHECK       HPI:    Ben Salvador is a 89 year old (1/14/1928), who is being seen today for an episodic care visit. HPI information obtained from: facility chart records, facility staff, patient report and [facility] chart review.    Received a phone call from nursing on 1/27/21 that Temo had a painful toe that was red and warm, there were no open areas at that time. Doxycycline was ordered on 1/27, started on 1/29. Message on bridge from 1/30: \"RN noted a small amount of serosang/pus-like drainage and a small pin point open area at the base of the nail. Cleansed with Dermal Wound Cleanser, applied bacitracin ointment, non-stick pad and wrapped with antonio and tape.\"    Temo was visited today while in his room eating breakfast. He report the pain is present in which he is taking the PRN tylenol which is helpful. He does not think the toe is getting better. Reports that it is currently wrapped and that it is painful when the shoe is placed on and off. We discussed using no shoe and just the slipper sock but he prefers to have his shoe on so he does not slip while toileting himself. He would like to have tylenol scheduled for the next week while the toe is healing.     Past Medical and Surgical History reviewed in Epic today.    MEDICATIONS:    Current Outpatient Medications   Medication Sig Dispense Refill     allopurinol (ZYLOPRIM) 100 MG tablet TAKE 1 TABLET BY MOUTH ONCE DAILY 30 tablet PRN     ASPIRIN LOW DOSE 81 MG chewable tablet CHEW AND SWALLOW ONE TABLET BY MOUTH ONCE DAILY 30 tablet PRN     atorvastatin (LIPITOR) 40 MG tablet TAKE 1 TABLET BY MOUTH ONCE DAILY 30 tablet PRN     carbidopa-levodopa " "(SINEMET)  MG tablet TAKE 1 TABLET BY MOUTH TWICE DAILY IN THE MORNING & IN THE EVENING 56 tablet PRN     citalopram (CELEXA) 20 MG tablet TAKE 1 TABLET BY MOUTH ONCE DAILY 28 tablet PRN     cyanocobalamin (VITAMIN B-12) 500 MCG tablet TAKE 1 TABLET BY MOUTH ONCE DAILY 30 tablet PRN     mirtazapine (REMERON) 7.5 MG tablet TAKE 1 TABLET BY MOUTH AT BEDTIME 31 tablet PRN     Multiple Vitamins-Minerals (CEROVITE SENIOR) TABS TAKE 1 TABLET BY MOUTH ONCE DAILY 30 tablet PRN     nitroGLYcerin (NITROSTAT) 0.4 MG sublingual tablet PLACE 1 TABLET UNDER TONGUE AT ONSET OF CHEST PAIN. MAY REPEAT EVERY 5 MINUTES AS NEEDED X 3 DOSES 25 tablet 97     polyethylene glycol (MIRALAX/GLYCOLAX) packet Take 8.5 g by mouth daily as needed for constipation       traZODone (DESYREL) 50 MG tablet Take 1/2 (25 mg) BY MOUTH TWICE DAILY;& ONCE DAILY AS NEEDED FOR ANXIETY 25 tablet PRN     triamcinolone (KENALOG) 0.1 % external ointment APPLY TOPICALLY TO LOWER EXTREMITY RASH TWICE DAILY AS NEEDED 80 g 97     VITAMIN D3 25 MCG (1000 UT) tablet TAKE TWO TABLETS (2000 UNITS) BY MOUTH ONCE DAILY  30 tablet PRN     REVIEW OF SYSTEMS:  4 point ROS including Respiratory, CV, GI and , other than that noted in the HPI,  is negative    Objective:  BP (!) 166/75   Pulse 56   Temp 96.2  F (35.7  C)   Resp 18   Ht 1.448 m (4' 9\")   Wt 50.3 kg (111 lb)   SpO2 97%   BMI 24.02 kg/m    Exam:  GENERAL APPEARANCE:  Alert, in no distress  EYES: no discharge or mattering on lids or lashes noted  ENT:  moist mucous membranes, hearing acuity intact  RESP: no respiratory distress, patient is on room air  CV: Edema none in bilateral lower extremities.  M/S:   Gait and station: wheelchair bound, able to move all extremities   NEURO: no facial asymmetry, no speech deficits and able to follow directions, moves all extremities symmetrically  SKIN:  Left toe with small open area at the base of toe nail. Scant amount of serousangenous drainage. Surrounding " area is pink, no swelling or warmth.   PSYCH:  insight and judgement intact, memory intact, affect and mood normal    Labs:   reviewed    ASSESSMENT/PLAN:  (L03.032) Cellulitis of toe of left foot  (primary encounter diagnosis)  Comment: started without an open area but did eventually open up and start draining pus like drainage. He was started on doxycycline given underlying CKD and that appears to be working as the surrounding area is no longer red, hot or with swelling. Now that he has the open area, would like to extend the course of abx to a 7 day course.  Plan:   --extend doxy course to 7 days which will be completed on 2/5.   --monitor the open area on the toe and if does not improve, may need to have home care eval and treat  --change tylenol to 650 mg BID and BID PRN x 7 days then return back to previous tylenol order of 650 mg q6h PRN thereafter  --to see podiatry when they come on site    (N18.32) Stage 3b chronic kidney disease  Comment: baseline creatinine of 1.8-1.9.   Plan: Avoid nephrotoxic medications. Recheck BMP on next lab day.     Electronically signed by:  SYBIL Milton CNP               Sincerely,        SYBIL Milton CNP

## 2021-02-03 ENCOUNTER — RECORDS - HEALTHEAST (OUTPATIENT)
Dept: LAB | Facility: CLINIC | Age: 86
End: 2021-02-03

## 2021-02-04 ENCOUNTER — TRANSFERRED RECORDS (OUTPATIENT)
Dept: HEALTH INFORMATION MANAGEMENT | Facility: CLINIC | Age: 86
End: 2021-02-04

## 2021-02-04 DIAGNOSIS — R63.4 WEIGHT LOSS: ICD-10-CM

## 2021-02-04 LAB
ANION GAP SERPL CALCULATED.3IONS-SCNC: 10 MMOL/L (ref 5–18)
ANION GAP SERPL CALCULATED.3IONS-SCNC: 10 MMOL/L (ref 5–18)
BUN SERPL-MCNC: 56 MG/DL (ref 8–28)
BUN SERPL-MCNC: 56 MG/DL (ref 8–28)
CALCIUM SERPL-MCNC: 9.2 MG/DL (ref 8.5–10.5)
CALCIUM SERPL-MCNC: 9.2 MG/DL (ref 8.5–10.5)
CHLORIDE BLD-SCNC: 106 MMOL/L (ref 98–107)
CHLORIDE SERPLBLD-SCNC: 106 MMOL/L (ref 98–107)
CO2 SERPL-SCNC: 26 MMOL/L (ref 22–31)
CO2 SERPL-SCNC: 26 MMOL/L (ref 22–31)
CREAT SERPL-MCNC: 1.94 MG/DL (ref 0.7–1.3)
CREAT SERPL-MCNC: 1.94 MG/DL (ref 0.7–1.3)
GFR SERPL CREATININE-BSD FRML MDRD: 32 ML/MIN/1.73M2
GFR SERPL CREATININE-BSD FRML MDRD: 32 ML/MIN/1.73M2
GLUCOSE BLD-MCNC: 103 MG/DL (ref 70–125)
GLUCOSE SERPL-MCNC: 103 MG/DL (ref 70–125)
POTASSIUM BLD-SCNC: 4.7 MMOL/L (ref 3.5–5)
POTASSIUM SERPL-SCNC: 4.7 MMOL/L (ref 3.5–5)
SODIUM SERPL-SCNC: 142 MMOL/L (ref 136–145)
SODIUM SERPL-SCNC: 142 MMOL/L (ref 136–145)

## 2021-02-05 RX ORDER — UREA 10 %
LOTION (ML) TOPICAL
Qty: 28 TABLET | Status: SHIPPED | OUTPATIENT
Start: 2021-02-05 | End: 2022-02-08

## 2021-02-08 NOTE — PROGRESS NOTES
[facility]  [place] Medical Record Number: [id number]  [facility] [[id number][facility]  Chief Complaint   Patient presents with     RECHECK       HPI:    Ben Salvador is a 89 year old (1/14/1928), who is being seen today for an episodic care visit. HPI information obtained from: facility chart records, facility staff, patient report and [facility] chart review.    Temo was visited today while in his room. He is just getting ready for the day. Reports that he no longer has pain in the toe and it has healed up. He slept well last night.     Past Medical and Surgical History reviewed in Epic today.    MEDICATIONS:    Current Outpatient Medications   Medication Sig Dispense Refill     allopurinol (ZYLOPRIM) 100 MG tablet TAKE 1 TABLET BY MOUTH ONCE DAILY 30 tablet PRN     ASPIRIN LOW DOSE 81 MG chewable tablet CHEW AND SWALLOW ONE TABLET BY MOUTH ONCE DAILY 30 tablet PRN     atorvastatin (LIPITOR) 40 MG tablet TAKE 1 TABLET BY MOUTH ONCE DAILY 30 tablet PRN     carbidopa-levodopa (SINEMET)  MG tablet TAKE 1 TABLET BY MOUTH TWICE DAILY IN THE MORNING & IN THE EVENING 56 tablet PRN     citalopram (CELEXA) 20 MG tablet TAKE 1 TABLET BY MOUTH ONCE DAILY 28 tablet PRN     cyanocobalamin (VITAMIN B-12) 500 MCG tablet TAKE 1 TABLET BY MOUTH ONCE DAILY 30 tablet PRN     mirtazapine (REMERON) 7.5 MG tablet TAKE 1 TABLET BY MOUTH AT BEDTIME 31 tablet PRN     Multiple Vitamins-Minerals (CEROVITE SENIOR) TABS TAKE 1 TABLET BY MOUTH ONCE DAILY 30 tablet PRN     nitroGLYcerin (NITROSTAT) 0.4 MG sublingual tablet PLACE 1 TABLET UNDER TONGUE AT ONSET OF CHEST PAIN. MAY REPEAT EVERY 5 MINUTES AS NEEDED X 3 DOSES 25 tablet 97     polyethylene glycol (MIRALAX/GLYCOLAX) packet Take 8.5 g by mouth daily as needed for constipation       traZODone (DESYREL) 50 MG tablet Take 1/2 (25 mg) BY MOUTH TWICE DAILY;& ONCE DAILY AS NEEDED FOR ANXIETY 25 tablet PRN      "triamcinolone (KENALOG) 0.1 % external ointment APPLY TOPICALLY TO LOWER EXTREMITY RASH TWICE DAILY AS NEEDED 80 g 97     VITAMIN D3 25 MCG (1000 UT) tablet TAKE TWO TABLETS (2000 UNITS) BY MOUTH ONCE DAILY  30 tablet PRN     REVIEW OF SYSTEMS:  4 point ROS including Respiratory, CV, GI and , other than that noted in the HPI,  is negative    Objective:  BP (!) 166/75   Pulse 56   Temp 97.8  F (36.6  C)   Resp 18   Ht 1.448 m (4' 9\")   Wt 50.3 kg (111 lb)   SpO2 98%   BMI 24.02 kg/m    Exam:  GENERAL APPEARANCE:  Alert, in no distress  EYES: no discharge or mattering on lids or lashes noted  ENT:  moist mucous membranes, hearing acuity intact  RESP: no respiratory distress, patient is on room air  CV: Edema none in bilateral lower extremities.  M/S:   Gait and station: wheelchair bound, able to move all extremities   NEURO: no facial asymmetry, no speech deficits and able to follow directions, moves all extremities symmetrically  SKIN: no longer open area on the toe. Toenails are long and appear to have fungal  PSYCH:  insight and judgement intact, memory intact, affect and mood normal    Labs:   CBC RESULTS:   Recent Labs   Lab Test 10/26/20 08/17/20   WBC  --  6.5   RBC  --  3.18*   HGB 10.2* 9.6*   HCT  --  30.4*   MCV  --  96   MCH  --  30.2   MCHC  --  31.6*   RDW  --  14.8*   PLT  --  164       Last Basic Metabolic Panel:  Recent Labs   Lab Test 02/04/21 10/26/20    141   POTASSIUM 4.7 5.5*   CHLORIDE 106 108*   LUZ 9.2 8.9   CO2 26 29   BUN 56* 56*   CR 1.94* 1.91*    123*       Liver Function Studies -   Recent Labs   Lab Test 08/17/20   PROTTOTAL 5.8*   ALBUMIN 3.2*   BILITOTAL 0.3   ALKPHOS 122*   AST 15   ALT <9       ASSESSMENT/PLAN:  (L03.032) Cellulitis of toe of left foot  (primary encounter diagnosis)  Comment: started without an open area but did eventually open up and start draining pus like drainage. He was started on doxycycline given underlying CKD and the open area has now " healed up and there is no further redness.   Plan:  --to see podiatry when they come on site    (N18.32) Stage 3b chronic kidney disease  Comment: baseline creatinine of 1.8-1.9 and was 1.94 on 2/4/21  Plan: Avoid nephrotoxic medications. Recheck BMP q6 months.     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 Has component of paranoia and has not left his apartment since he moved into the facility in February with the exception of getting his hair cut.   Plan: mirtazapine 7.5 mg/HS   --citalopram 20 mg/day     --trazadone 25 mg BID (have asked to decrease this but he would like to continue as is since he feels well on this regimen.     (G20) Parkinsonism, unspecified Parkinsonism type (H)    Comment: LE tremors and freezing episodes have subsided after starting the carvidopa.   Plan: Sinemet 10/100 BID.   --increase as needed.     Electronically signed by:  SYBIL Milton CNP

## 2021-02-09 VITALS
OXYGEN SATURATION: 98 % | BODY MASS INDEX: 21.79 KG/M2 | HEIGHT: 60 IN | DIASTOLIC BLOOD PRESSURE: 75 MMHG | WEIGHT: 111 LBS | TEMPERATURE: 97.8 F | HEART RATE: 56 BPM | RESPIRATION RATE: 18 BRPM | SYSTOLIC BLOOD PRESSURE: 166 MMHG

## 2021-02-09 ASSESSMENT — MIFFLIN-ST. JEOR: SCORE: 948.37

## 2021-02-10 ENCOUNTER — ASSISTED LIVING VISIT (OUTPATIENT)
Dept: GERIATRICS | Facility: CLINIC | Age: 86
End: 2021-02-10
Payer: MEDICARE

## 2021-02-10 DIAGNOSIS — N18.32 STAGE 3B CHRONIC KIDNEY DISEASE (H): ICD-10-CM

## 2021-02-10 DIAGNOSIS — G20.C PARKINSONISM, UNSPECIFIED PARKINSONISM TYPE (H): ICD-10-CM

## 2021-02-10 DIAGNOSIS — L03.032 CELLULITIS OF TOE OF LEFT FOOT: Primary | ICD-10-CM

## 2021-02-10 DIAGNOSIS — F33.1 MODERATE EPISODE OF RECURRENT MAJOR DEPRESSIVE DISORDER (H): ICD-10-CM

## 2021-02-10 DIAGNOSIS — F41.1 GENERALIZED ANXIETY DISORDER: ICD-10-CM

## 2021-02-10 NOTE — LETTER
2/10/2021        RE: Ben Salvador  C/o Torin Salvador  [address] [facility] [address]        [facility]  [place] Medical Record Number: [id number]  Place of Service where encounter took place: [facility] ([facility] [[id number]]  Chief Complaint   Patient presents with     RECHECK       HPI:    Ben Salvador is a 89 year old (1/14/1928), who is being seen today for an episodic care visit. HPI information obtained from: facility chart records, facility staff, patient report and [facility] chart review.    Temo was visited today while in his room. He is just getting ready for the day. Reports that he no longer has pain in the toe and it has healed up. He slept well last night.     Past Medical and Surgical History reviewed in Epic today.    MEDICATIONS:    Current Outpatient Medications   Medication Sig Dispense Refill     allopurinol (ZYLOPRIM) 100 MG tablet TAKE 1 TABLET BY MOUTH ONCE DAILY 30 tablet PRN     ASPIRIN LOW DOSE 81 MG chewable tablet CHEW AND SWALLOW ONE TABLET BY MOUTH ONCE DAILY 30 tablet PRN     atorvastatin (LIPITOR) 40 MG tablet TAKE 1 TABLET BY MOUTH ONCE DAILY 30 tablet PRN     carbidopa-levodopa (SINEMET)  MG tablet TAKE 1 TABLET BY MOUTH TWICE DAILY IN THE MORNING & IN THE EVENING 56 tablet PRN     citalopram (CELEXA) 20 MG tablet TAKE 1 TABLET BY MOUTH ONCE DAILY 28 tablet PRN     cyanocobalamin (VITAMIN B-12) 500 MCG tablet TAKE 1 TABLET BY MOUTH ONCE DAILY 30 tablet PRN     mirtazapine (REMERON) 7.5 MG tablet TAKE 1 TABLET BY MOUTH AT BEDTIME 31 tablet PRN     Multiple Vitamins-Minerals (CEROVITE SENIOR) TABS TAKE 1 TABLET BY MOUTH ONCE DAILY 30 tablet PRN     nitroGLYcerin (NITROSTAT) 0.4 MG sublingual tablet PLACE 1 TABLET UNDER TONGUE AT ONSET OF CHEST PAIN. MAY REPEAT EVERY 5 MINUTES AS NEEDED X 3 DOSES 25 tablet 97     polyethylene glycol (MIRALAX/GLYCOLAX) packet Take 8.5 g by mouth daily as needed for constipation       traZODone (DESYREL) 50  "MG tablet Take 1/2 (25 mg) BY MOUTH TWICE DAILY;& ONCE DAILY AS NEEDED FOR ANXIETY 25 tablet PRN     triamcinolone (KENALOG) 0.1 % external ointment APPLY TOPICALLY TO LOWER EXTREMITY RASH TWICE DAILY AS NEEDED 80 g 97     VITAMIN D3 25 MCG (1000 UT) tablet TAKE TWO TABLETS (2000 UNITS) BY MOUTH ONCE DAILY  30 tablet PRN     REVIEW OF SYSTEMS:  4 point ROS including Respiratory, CV, GI and , other than that noted in the HPI,  is negative    Objective:  BP (!) 166/75   Pulse 56   Temp 97.8  F (36.6  C)   Resp 18   Ht 1.448 m (4' 9\")   Wt 50.3 kg (111 lb)   SpO2 98%   BMI 24.02 kg/m    Exam:  GENERAL APPEARANCE:  Alert, in no distress  EYES: no discharge or mattering on lids or lashes noted  ENT:  moist mucous membranes, hearing acuity intact  RESP: no respiratory distress, patient is on room air  CV: Edema none in bilateral lower extremities.  M/S:   Gait and station: wheelchair bound, able to move all extremities   NEURO: no facial asymmetry, no speech deficits and able to follow directions, moves all extremities symmetrically  SKIN: no longer open area on the toe. Toenails are long and appear to have fungal  PSYCH:  insight and judgement intact, memory intact, affect and mood normal    Labs:   CBC RESULTS:   Recent Labs   Lab Test 10/26/20 08/17/20   WBC  --  6.5   RBC  --  3.18*   HGB 10.2* 9.6*   HCT  --  30.4*   MCV  --  96   MCH  --  30.2   MCHC  --  31.6*   RDW  --  14.8*   PLT  --  164       Last Basic Metabolic Panel:  Recent Labs   Lab Test 02/04/21 10/26/20    141   POTASSIUM 4.7 5.5*   CHLORIDE 106 108*   LUZ 9.2 8.9   CO2 26 29   BUN 56* 56*   CR 1.94* 1.91*    123*       Liver Function Studies -   Recent Labs   Lab Test 08/17/20   PROTTOTAL 5.8*   ALBUMIN 3.2*   BILITOTAL 0.3   ALKPHOS 122*   AST 15   ALT <9       ASSESSMENT/PLAN:  (L03.032) Cellulitis of toe of left foot  (primary encounter diagnosis)  Comment: started without an open area but did eventually open up and start " draining pus like drainage. He was started on doxycycline given underlying CKD and the open area has now healed up and there is no further redness.   Plan:  --to see podiatry when they come on site    (N18.32) Stage 3b chronic kidney disease  Comment: baseline creatinine of 1.8-1.9 and was 1.94 on 2/4/21  Plan: Avoid nephrotoxic medications. Recheck BMP q6 months.     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 Has component of paranoia and has not left his apartment since he moved into the facility in February with the exception of getting his hair cut.   Plan: mirtazapine 7.5 mg/HS   --citalopram 20 mg/day     --trazadone 25 mg BID (have asked to decrease this but he would like to continue as is since he feels well on this regimen.     (G20) Parkinsonism, unspecified Parkinsonism type (H)    Comment: LE tremors and freezing episodes have subsided after starting the carvidopa.   Plan: Sinemet 10/100 BID.   --increase as needed.     Electronically signed by:  SYBIL Milton CNP                 Sincerely,        SYBIL Milton CNP

## 2021-02-11 ENCOUNTER — RECORDS - HEALTHEAST (OUTPATIENT)
Dept: LAB | Facility: CLINIC | Age: 86
End: 2021-02-11

## 2021-02-11 LAB
SARS-COV-2 PCR COMMENT: NORMAL
SARS-COV-2 RNA SPEC QL NAA+PROBE: NEGATIVE
SARS-COV-2 VIRUS SPECIMEN SOURCE: NORMAL

## 2021-02-13 PROBLEM — F33.1 MODERATE EPISODE OF RECURRENT MAJOR DEPRESSIVE DISORDER (H): Status: ACTIVE | Noted: 2021-02-13

## 2021-03-09 DIAGNOSIS — I10 ESSENTIAL HYPERTENSION: ICD-10-CM

## 2021-03-09 DIAGNOSIS — M10.9 GOUT, UNSPECIFIED CAUSE, UNSPECIFIED CHRONICITY, UNSPECIFIED SITE: ICD-10-CM

## 2021-03-09 DIAGNOSIS — R63.4 WEIGHT LOSS: ICD-10-CM

## 2021-03-10 RX ORDER — MULTIVIT-MIN/FA/LYCOPEN/LUTEIN .4-300-25
TABLET ORAL
Qty: 28 TABLET | Refills: 97 | Status: SHIPPED | OUTPATIENT
Start: 2021-03-10 | End: 2022-04-11

## 2021-03-10 RX ORDER — ALLOPURINOL 100 MG/1
TABLET ORAL
Qty: 28 TABLET | Refills: 97 | Status: SHIPPED | OUTPATIENT
Start: 2021-03-10 | End: 2022-04-11

## 2021-03-10 RX ORDER — ATORVASTATIN CALCIUM 40 MG/1
TABLET, FILM COATED ORAL
Qty: 28 TABLET | Refills: 97 | Status: SHIPPED | OUTPATIENT
Start: 2021-03-10 | End: 2022-04-11

## 2021-03-10 RX ORDER — CHOLECALCIFEROL (VITAMIN D3) 25 MCG
TABLET ORAL
Qty: 56 TABLET | Refills: 97 | Status: SHIPPED | OUTPATIENT
Start: 2021-03-10 | End: 2022-04-11

## 2021-03-10 RX ORDER — ASPIRIN 81 MG
TABLET,CHEWABLE ORAL
Qty: 28 TABLET | Refills: 97 | Status: SHIPPED | OUTPATIENT
Start: 2021-03-10 | End: 2022-04-11

## 2021-03-17 ENCOUNTER — TELEPHONE (OUTPATIENT)
Dept: GERIATRICS | Facility: CLINIC | Age: 86
End: 2021-03-17

## 2021-03-17 NOTE — TELEPHONE ENCOUNTER
"[facility] BRIDGE COMMUNICATION DOCUMENTATION ENCOUNTER    Ben Salvador is a 89 year old  (1/14/1928), Nurse messaged [facility] via the \"Bridge\" today to report:   03/17/2021 1:54:18 PM - By: Pilar Bae       Temo wanted a picture sent of his left lower leg. he feels the triamcinolone oint. 0.1% that is put on daily isn't helping the thick gray patches of dead skin or the red areas where it has sloughed. Where should the picture be sent?    ASSESSMENT/PLAN  Sent to PCP to advise upon return from time away.       Please respond in this encounter.     Electronically signed by:   Daphney Lau RN  "

## 2021-03-22 VITALS — BODY MASS INDEX: 21.79 KG/M2 | TEMPERATURE: 97.5 F | HEIGHT: 60 IN | WEIGHT: 111 LBS

## 2021-03-22 ASSESSMENT — MIFFLIN-ST. JEOR: SCORE: 948.37

## 2021-03-22 NOTE — PROGRESS NOTES
"[facility]  [place] Medical Record Number: [id number]  Place of Service where encounter took place: [facility] [[id number]]  Chief Complaint   Patient presents with     RECHECK       HPI:    Ben Salvador is a 89 year old (1/14/1928), who is being seen today for an episodic care visit. HPI information obtained from: facility chart records, facility staff, patient report and [facility] chart review.    Temo was visited today while in his room just rising for the day. He is concerned with the appearance of the RLE as he has been using triamcinolone cream to the leg and \"it's not working\". There is not pain or discomfort to the leg. He reports that he has taken his HR intermittently and some readings have been in the 50s, he has been asymptomatic during those times. He denies chest pain or shortness of breath.     Past Medical and Surgical History reviewed in Epic today.    MEDICATIONS:    Current Outpatient Medications   Medication Sig Dispense Refill     allopurinol (ZYLOPRIM) 100 MG tablet TAKE 1 TABLET BY MOUTH ONCE DAILY 30 tablet PRN     ASPIRIN LOW DOSE 81 MG chewable tablet CHEW AND SWALLOW ONE TABLET BY MOUTH ONCE DAILY 30 tablet PRN     atorvastatin (LIPITOR) 40 MG tablet TAKE 1 TABLET BY MOUTH ONCE DAILY 30 tablet PRN     carbidopa-levodopa (SINEMET)  MG tablet TAKE 1 TABLET BY MOUTH TWICE DAILY IN THE MORNING & IN THE EVENING 56 tablet PRN     citalopram (CELEXA) 20 MG tablet TAKE 1 TABLET BY MOUTH ONCE DAILY 28 tablet PRN     cyanocobalamin (VITAMIN B-12) 500 MCG tablet TAKE 1 TABLET BY MOUTH ONCE DAILY 30 tablet PRN     mirtazapine (REMERON) 7.5 MG tablet TAKE 1 TABLET BY MOUTH AT BEDTIME 31 tablet PRN     Multiple Vitamins-Minerals (CEROVITE SENIOR) TABS TAKE 1 TABLET BY MOUTH ONCE DAILY 30 tablet PRN     nitroGLYcerin (NITROSTAT) 0.4 MG sublingual tablet PLACE 1 TABLET UNDER TONGUE AT ONSET OF CHEST PAIN. MAY REPEAT EVERY 5 MINUTES AS NEEDED X 3 DOSES 25 tablet " "97     polyethylene glycol (MIRALAX/GLYCOLAX) packet Take 8.5 g by mouth daily as needed for constipation       traZODone (DESYREL) 50 MG tablet Take 1/2 (25 mg) BY MOUTH TWICE DAILY;& ONCE DAILY AS NEEDED FOR ANXIETY 25 tablet PRN     triamcinolone (KENALOG) 0.1 % external ointment APPLY TOPICALLY TO LOWER EXTREMITY RASH TWICE DAILY AS NEEDED 80 g 97     VITAMIN D3 25 MCG (1000 UT) tablet TAKE TWO TABLETS (2000 UNITS) BY MOUTH ONCE DAILY  30 tablet PRN     REVIEW OF SYSTEMS:  4 point ROS including Respiratory, CV, GI and , other than that noted in the HPI,  is negative    Objective:  Temp 97.5  F (36.4  C)   Ht 1.448 m (4' 9\")   Wt 50.3 kg (111 lb)   BMI 24.02 kg/m    Exam:  GENERAL APPEARANCE:  Alert, in no distress  EYES: no discharge or mattering on lids or lashes noted  ENT:  moist mucous membranes, hearing acuity intact  RESP: no respiratory distress, patient is on room air  CV: Edema none in bilateral lower extremities.  M/S:   Gait and station: wheelchair bound, able to move all extremities   NEURO: no facial asymmetry, no speech deficits and able to follow directions, moves all extremities symmetrically  SKIN: RLE with moist, dead skin tissue, skin tear that is red and moist area the size of quarter dollar  PSYCH:  insight and judgement intact, memory intact, affect and mood normal    Labs:   CBC RESULTS:   Recent Labs   Lab Test 10/26/20 08/17/20   WBC  --  6.5   RBC  --  3.18*   HGB 10.2* 9.6*   HCT  --  30.4*   MCV  --  96   MCH  --  30.2   MCHC  --  31.6*   RDW  --  14.8*   PLT  --  164       Last Basic Metabolic Panel:  Recent Labs   Lab Test 02/04/21 10/26/20    141   POTASSIUM 4.7 5.5*   CHLORIDE 106 108*   LUZ 9.2 8.9   CO2 26 29   BUN 56* 56*   CR 1.94* 1.91*    123*       Liver Function Studies -   Recent Labs   Lab Test 08/17/20   PROTTOTAL 5.8*   ALBUMIN 3.2*   BILITOTAL 0.3   ALKPHOS 122*   AST 15   ALT <9       ASSESSMENT/PLAN:  (L30.9) Dermatitis  Comment: has moist, dead " skin on legs that should be debrided. Attempted to remove a scaly piece today and a larger piece of skin removed with it and now has an appearance of a skin tear with red, raw looking skin. Spoke with the home care RN and WOC RN for recommendations.   Plan: vaseline gauze was placed on top of the sink tear and then wrapped with gauze. This will be changed by NP on Friday (addendum: changed on Friday and looked less superficial. Applied vaseline gauze again for the weekend and will assess again on Monday.   --apply eucerin cream at HS to LLE  --apply amlactin cream to RLE at HS    (N18.32) Stage 3b chronic kidney disease  (I10) hypertension  Comment: baseline creatinine of 1.8-1.9 and was 1.94 on 2/4/21. He reports some bradycardia when he checks his heart rate but today his rate is 64.   Plan: Avoid nephrotoxic medications. Recheck BMP q6 months.   --Continue to monitor blood pressure and heart rate, adjust medications as needed.    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 Has component of paranoia and has not left his apartment since he moved into the facility in February with the exception of getting his hair cut and seeing the podiatrist.   Plan: mirtazapine 7.5 mg/HS   --citalopram 20 mg/day     --trazadone 25 mg BID    (G20) Parkinsonism, unspecified Parkinsonism type (H)    Comment: LE tremors and freezing episodes have subsided after starting the carvidopa.   Plan: Sinemet 10/100 BID.   --increase as needed.     Electronically signed by:  SYBIL Milton CNP

## 2021-03-23 ENCOUNTER — ASSISTED LIVING VISIT (OUTPATIENT)
Dept: GERIATRICS | Facility: CLINIC | Age: 86
End: 2021-03-23
Payer: MEDICARE

## 2021-03-23 DIAGNOSIS — I10 BENIGN ESSENTIAL HYPERTENSION: ICD-10-CM

## 2021-03-23 DIAGNOSIS — F33.1 MODERATE EPISODE OF RECURRENT MAJOR DEPRESSIVE DISORDER (H): ICD-10-CM

## 2021-03-23 DIAGNOSIS — G20.C PARKINSONISM, UNSPECIFIED PARKINSONISM TYPE (H): ICD-10-CM

## 2021-03-23 DIAGNOSIS — F41.1 GENERALIZED ANXIETY DISORDER: ICD-10-CM

## 2021-03-23 DIAGNOSIS — L30.9 DERMATITIS: Primary | ICD-10-CM

## 2021-03-23 DIAGNOSIS — N18.32 STAGE 3B CHRONIC KIDNEY DISEASE (H): ICD-10-CM

## 2021-03-23 NOTE — LETTER
"    3/23/2021        RE: Ben Salvador  C/o Torin Salvador  [address] [facility] [address]        [place] [facility]  [place] Medical Record Number: [id number]  [facility] [[id number][facility]  Chief Complaint   Patient presents with     RECHECK       HPI:    Ben Salvador is a 89 year old (1/14/1928), who is being seen today for an episodic care visit. HPI information obtained from: facility chart records, facility staff, patient report and [facility] chart review.    Temo was visited today while in his room just rising for the day. He is concerned with the appearance of the RLE as he has been using triamcinolone cream to the leg and \"it's not working\". There is not pain or discomfort to the leg. He reports that he has taken his HR intermittently and some readings have been in the 50s, he has been asymptomatic during those times. He denies chest pain or shortness of breath.     Past Medical and Surgical History reviewed in Epic today.    MEDICATIONS:    Current Outpatient Medications   Medication Sig Dispense Refill     allopurinol (ZYLOPRIM) 100 MG tablet TAKE 1 TABLET BY MOUTH ONCE DAILY 30 tablet PRN     ASPIRIN LOW DOSE 81 MG chewable tablet CHEW AND SWALLOW ONE TABLET BY MOUTH ONCE DAILY 30 tablet PRN     atorvastatin (LIPITOR) 40 MG tablet TAKE 1 TABLET BY MOUTH ONCE DAILY 30 tablet PRN     carbidopa-levodopa (SINEMET)  MG tablet TAKE 1 TABLET BY MOUTH TWICE DAILY IN THE MORNING & IN THE EVENING 56 tablet PRN     citalopram (CELEXA) 20 MG tablet TAKE 1 TABLET BY MOUTH ONCE DAILY 28 tablet PRN     cyanocobalamin (VITAMIN B-12) 500 MCG tablet TAKE 1 TABLET BY MOUTH ONCE DAILY 30 tablet PRN     mirtazapine (REMERON) 7.5 MG tablet TAKE 1 TABLET BY MOUTH AT BEDTIME 31 tablet PRN     Multiple Vitamins-Minerals (CEROVITE SENIOR) TABS TAKE 1 TABLET BY MOUTH ONCE DAILY 30 tablet PRN     nitroGLYcerin (NITROSTAT) 0.4 MG sublingual tablet " "PLACE 1 TABLET UNDER TONGUE AT ONSET OF CHEST PAIN. MAY REPEAT EVERY 5 MINUTES AS NEEDED X 3 DOSES 25 tablet 97     polyethylene glycol (MIRALAX/GLYCOLAX) packet Take 8.5 g by mouth daily as needed for constipation       traZODone (DESYREL) 50 MG tablet Take 1/2 (25 mg) BY MOUTH TWICE DAILY;& ONCE DAILY AS NEEDED FOR ANXIETY 25 tablet PRN     triamcinolone (KENALOG) 0.1 % external ointment APPLY TOPICALLY TO LOWER EXTREMITY RASH TWICE DAILY AS NEEDED 80 g 97     VITAMIN D3 25 MCG (1000 UT) tablet TAKE TWO TABLETS (2000 UNITS) BY MOUTH ONCE DAILY  30 tablet PRN     REVIEW OF SYSTEMS:  4 point ROS including Respiratory, CV, GI and , other than that noted in the HPI,  is negative    Objective:  Temp 97.5  F (36.4  C)   Ht 1.448 m (4' 9\")   Wt 50.3 kg (111 lb)   BMI 24.02 kg/m    Exam:  GENERAL APPEARANCE:  Alert, in no distress  EYES: no discharge or mattering on lids or lashes noted  ENT:  moist mucous membranes, hearing acuity intact  RESP: no respiratory distress, patient is on room air  CV: Edema none in bilateral lower extremities.  M/S:   Gait and station: wheelchair bound, able to move all extremities   NEURO: no facial asymmetry, no speech deficits and able to follow directions, moves all extremities symmetrically  SKIN: RLE with moist, dead skin tissue, skin tear that is red and moist area the size of quarter dollar  PSYCH:  insight and judgement intact, memory intact, affect and mood normal    Labs:   CBC RESULTS:   Recent Labs   Lab Test 10/26/20 08/17/20   WBC  --  6.5   RBC  --  3.18*   HGB 10.2* 9.6*   HCT  --  30.4*   MCV  --  96   MCH  --  30.2   MCHC  --  31.6*   RDW  --  14.8*   PLT  --  164       Last Basic Metabolic Panel:  Recent Labs   Lab Test 02/04/21 10/26/20    141   POTASSIUM 4.7 5.5*   CHLORIDE 106 108*   LUZ 9.2 8.9   CO2 26 29   BUN 56* 56*   CR 1.94* 1.91*    123*       Liver Function Studies -   Recent Labs   Lab Test 08/17/20   PROTTOTAL 5.8*   ALBUMIN 3.2*   BILITOTAL " 0.3   ALKPHOS 122*   AST 15   ALT <9       ASSESSMENT/PLAN:  (L30.9) Dermatitis  Comment: has moist, dead skin on legs that should be debrided. Attempted to remove a scaly piece today and a larger piece of skin removed with it and now has an appearance of a skin tear with red, raw looking skin. Spoke with the home care RN and WOC RN for recommendations.   Plan: vaseline gauze was placed on top of the sink tear and then wrapped with gauze. This will be changed by NP on Friday (addendum: changed on Friday and looked less superficial. Applied vaseline gauze again for the weekend and will assess again on Monday.   --apply eucerin cream at HS to LLE  --apply amlactin cream to RLE at HS    (N18.32) Stage 3b chronic kidney disease  (I10) hypertension  Comment: baseline creatinine of 1.8-1.9 and was 1.94 on 2/4/21. He reports some bradycardia when he checks his heart rate but today his rate is 64.   Plan: Avoid nephrotoxic medications. Recheck BMP q6 months.   --Continue to monitor blood pressure and heart rate, adjust medications as needed.    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 Has component of paranoia and has not left his apartment since he moved into the facility in February with the exception of getting his hair cut and seeing the podiatrist.   Plan: mirtazapine 7.5 mg/HS   --citalopram 20 mg/day     --trazadone 25 mg BID    (G20) Parkinsonism, unspecified Parkinsonism type (H)    Comment: LE tremors and freezing episodes have subsided after starting the carvidopa.   Plan: Sinemet 10/100 BID.   --increase as needed.     Electronically signed by:  SYBIL Milton CNP                     Sincerely,        SYBIL Milton CNP

## 2021-03-29 ENCOUNTER — ASSISTED LIVING VISIT (OUTPATIENT)
Dept: GERIATRICS | Facility: CLINIC | Age: 86
End: 2021-03-29
Payer: MEDICARE

## 2021-03-29 VITALS — HEIGHT: 60 IN | BODY MASS INDEX: 21.79 KG/M2 | WEIGHT: 111 LBS | OXYGEN SATURATION: 95 % | TEMPERATURE: 98.1 F

## 2021-03-29 DIAGNOSIS — I10 BENIGN ESSENTIAL HYPERTENSION: ICD-10-CM

## 2021-03-29 DIAGNOSIS — L30.9 DERMATITIS: Primary | ICD-10-CM

## 2021-03-29 DIAGNOSIS — F33.1 MODERATE EPISODE OF RECURRENT MAJOR DEPRESSIVE DISORDER (H): ICD-10-CM

## 2021-03-29 DIAGNOSIS — N18.32 STAGE 3B CHRONIC KIDNEY DISEASE (H): ICD-10-CM

## 2021-03-29 DIAGNOSIS — F41.1 GENERALIZED ANXIETY DISORDER: ICD-10-CM

## 2021-03-29 ASSESSMENT — MIFFLIN-ST. JEOR: SCORE: 948.37

## 2021-03-29 NOTE — LETTER
"    3/29/2021        RE: Ben Salvador  C/o Torin Salvador  [address] [facility] [address]        [place] [facility]  [place] [facility] Record Number: [id number]  Place of Service where encounter took place: [facility] ([facility]) [[id number]]  Chief Complaint   Patient presents with     RECHECK       HPI:    Ben Salvador is a 89 year old (1/14/1928), who is being seen today for an episodic care visit. HPI information obtained from: facility chart records, facility staff, patient report and [facility] chart review.    Temo was visited today while in his room just rising for the day.  There is not pain or discomfort to the leg, has not noted swelling. He again reports that he is concerned about his heart rate being in the 40s. He checks it several times throughout the day, \"blood pressure is always good, but heart rate is in the 40s\". He denies chest pain, dizziness or shortness of breath. He does feels anxious about this at times and asks what we should do. We discussed getting a ziopatch in place to determine if he is having rates in the 40s (he has consistently had rates in the 60s on exams), but not likely since he is asymptomatic.     Past Medical and Surgical History reviewed in Epic today.    MEDICATIONS:    Current Outpatient Medications   Medication Sig Dispense Refill     allopurinol (ZYLOPRIM) 100 MG tablet TAKE 1 TABLET BY MOUTH ONCE DAILY 30 tablet PRN     ASPIRIN LOW DOSE 81 MG chewable tablet CHEW AND SWALLOW ONE TABLET BY MOUTH ONCE DAILY 30 tablet PRN     atorvastatin (LIPITOR) 40 MG tablet TAKE 1 TABLET BY MOUTH ONCE DAILY 30 tablet PRN     carbidopa-levodopa (SINEMET)  MG tablet TAKE 1 TABLET BY MOUTH TWICE DAILY IN THE MORNING & IN THE EVENING 56 tablet PRN     citalopram (CELEXA) 20 MG tablet TAKE 1 TABLET BY MOUTH ONCE DAILY 28 tablet PRN     cyanocobalamin (VITAMIN B-12) 500 MCG tablet TAKE 1 TABLET BY MOUTH ONCE DAILY 30 tablet PRN     mirtazapine (REMERON) 7.5 MG " "tablet TAKE 1 TABLET BY MOUTH AT BEDTIME 31 tablet PRN     Multiple Vitamins-Minerals (CEROVITE SENIOR) TABS TAKE 1 TABLET BY MOUTH ONCE DAILY 30 tablet PRN     nitroGLYcerin (NITROSTAT) 0.4 MG sublingual tablet PLACE 1 TABLET UNDER TONGUE AT ONSET OF CHEST PAIN. MAY REPEAT EVERY 5 MINUTES AS NEEDED X 3 DOSES 25 tablet 97     polyethylene glycol (MIRALAX/GLYCOLAX) packet Take 8.5 g by mouth daily as needed for constipation       traZODone (DESYREL) 50 MG tablet Take 1/2 (25 mg) BY MOUTH TWICE DAILY;& ONCE DAILY AS NEEDED FOR ANXIETY 25 tablet PRN     triamcinolone (KENALOG) 0.1 % external ointment APPLY TOPICALLY TO LOWER EXTREMITY RASH TWICE DAILY AS NEEDED 80 g 97     VITAMIN D3 25 MCG (1000 UT) tablet TAKE TWO TABLETS (2000 UNITS) BY MOUTH ONCE DAILY  30 tablet PRN     REVIEW OF SYSTEMS:  4 point ROS including Respiratory, CV, GI and , other than that noted in the HPI,  is negative    Objective:  Temp 98.1  F (36.7  C)   Ht 1.448 m (4' 9\")   Wt 50.3 kg (111 lb)   SpO2 95%   BMI 24.02 kg/m    Exam:  GENERAL APPEARANCE:  Alert, in no distress  EYES: no discharge or mattering on lids or lashes noted  ENT:  moist mucous membranes, hearing acuity intact  RESP: no respiratory distress, clear lung sounds, patient is on room air  CV: regular rhythm. Edema none in bilateral lower extremities.  M/S:   Gait and station: wheelchair bound, able to move all extremities   NEURO: no facial asymmetry, no speech deficits and able to follow directions, moves all extremities symmetrically  SKIN:  skin tear on RLE more superficial and smaller in size, moist.   PSYCH:  insight and judgement intact, memory intact, affect and mood normal    Labs:   CBC RESULTS:   Recent Labs   Lab Test 10/26/20 08/17/20   WBC  --  6.5   RBC  --  3.18*   HGB 10.2* 9.6*   HCT  --  30.4*   MCV  --  96   MCH  --  30.2   MCHC  --  31.6*   RDW  --  14.8*   PLT  --  164       Last Basic Metabolic Panel:  Recent Labs   Lab Test 02/04/21 10/26/20    " 141   POTASSIUM 4.7 5.5*   CHLORIDE 106 108*   LUZ 9.2 8.9   CO2 26 29   BUN 56* 56*   CR 1.94* 1.91*    123*       Liver Function Studies -   Recent Labs   Lab Test 08/17/20   PROTTOTAL 5.8*   ALBUMIN 3.2*   BILITOTAL 0.3   ALKPHOS 122*   AST 15   ALT <9       ASSESSMENT/PLAN:  (L30.9) Dermatitis  Comment: has moist, dead skin on legs that should be debrided. Attempted to remove a scaly piece and a larger piece of skin removed and now with a skin tear. This has improved over the past few days with the use of vaseline gauze.    Plan: vaseline gauze was placed on top of the sink tear and then wrapped with gauze.  Assume we will be able to remove this week as it is almost all healed.   --apply eucerin cream at HS to LLE  --apply amlactin cream to RLE at HS    (N18.32) Stage 3b chronic kidney disease  (I10) hypertension  Comment: baseline creatinine of 1.8-1.9 and was 1.94 on 2/4/21. He reports some bradycardia when he checks his heart rate but today his rate is 62. If he becomes symptomatic, we may need to do a ziopatch to determine if he does have bradycardia. We discussed this today and he agrees to hold off on the heart monitor for now given he feels well. He is questioning if his BP/HR monitor is not working correctly.   Plan: Avoid nephrotoxic medications. Recheck BMP q6 months.   --Continue to monitor blood pressure and heart rate, adjust medications as needed.  --if becomes symptomatic, consider a ziopatch.    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 We discussed not checking his vitals unless he feels symptoms so it will decrease the anxiety.   Plan: mirtazapine 7.5 mg/HS   --citalopram 20 mg/day     --trazadone 25 mg BID    Electronically signed by:  SYBIL Milton CNP                        Sincerely,        SYBIL Milton CNP

## 2021-03-29 NOTE — PROGRESS NOTES
"[place] [facility]  [place] [facility] Record Number: [id number]  Place of Service where encounter took place: [facility] ([facility]) [[id number]]  Chief Complaint   Patient presents with     RECHECK       HPI:    Ben Salvador is a 89 year old (1/14/1928), who is being seen today for an episodic care visit. HPI information obtained from: facility chart records, facility staff, patient report and [facility] chart review.    Temo was visited today while in his room just rising for the day.  There is not pain or discomfort to the leg, has not noted swelling. He again reports that he is concerned about his heart rate being in the 40s. He checks it several times throughout the day, \"blood pressure is always good, but heart rate is in the 40s\". He denies chest pain, dizziness or shortness of breath. He does feels anxious about this at times and asks what we should do. We discussed getting a ziopatch in place to determine if he is having rates in the 40s (he has consistently had rates in the 60s on exams), but not likely since he is asymptomatic.     Past Medical and Surgical History reviewed in Epic today.    MEDICATIONS:    Current Outpatient Medications   Medication Sig Dispense Refill     allopurinol (ZYLOPRIM) 100 MG tablet TAKE 1 TABLET BY MOUTH ONCE DAILY 30 tablet PRN     ASPIRIN LOW DOSE 81 MG chewable tablet CHEW AND SWALLOW ONE TABLET BY MOUTH ONCE DAILY 30 tablet PRN     atorvastatin (LIPITOR) 40 MG tablet TAKE 1 TABLET BY MOUTH ONCE DAILY 30 tablet PRN     carbidopa-levodopa (SINEMET)  MG tablet TAKE 1 TABLET BY MOUTH TWICE DAILY IN THE MORNING & IN THE EVENING 56 tablet PRN     citalopram (CELEXA) 20 MG tablet TAKE 1 TABLET BY MOUTH ONCE DAILY 28 tablet PRN     cyanocobalamin (VITAMIN B-12) 500 MCG tablet TAKE 1 TABLET BY MOUTH ONCE DAILY 30 tablet PRN     mirtazapine (REMERON) 7.5 MG tablet TAKE 1 TABLET BY MOUTH AT BEDTIME 31 tablet PRN     Multiple Vitamins-Minerals (CEROVITE SENIOR) " "TABS TAKE 1 TABLET BY MOUTH ONCE DAILY 30 tablet PRN     nitroGLYcerin (NITROSTAT) 0.4 MG sublingual tablet PLACE 1 TABLET UNDER TONGUE AT ONSET OF CHEST PAIN. MAY REPEAT EVERY 5 MINUTES AS NEEDED X 3 DOSES 25 tablet 97     polyethylene glycol (MIRALAX/GLYCOLAX) packet Take 8.5 g by mouth daily as needed for constipation       traZODone (DESYREL) 50 MG tablet Take 1/2 (25 mg) BY MOUTH TWICE DAILY;& ONCE DAILY AS NEEDED FOR ANXIETY 25 tablet PRN     triamcinolone (KENALOG) 0.1 % external ointment APPLY TOPICALLY TO LOWER EXTREMITY RASH TWICE DAILY AS NEEDED 80 g 97     VITAMIN D3 25 MCG (1000 UT) tablet TAKE TWO TABLETS (2000 UNITS) BY MOUTH ONCE DAILY  30 tablet PRN     REVIEW OF SYSTEMS:  4 point ROS including Respiratory, CV, GI and , other than that noted in the HPI,  is negative    Objective:  Temp 98.1  F (36.7  C)   Ht 1.448 m (4' 9\")   Wt 50.3 kg (111 lb)   SpO2 95%   BMI 24.02 kg/m    Exam:  GENERAL APPEARANCE:  Alert, in no distress  EYES: no discharge or mattering on lids or lashes noted  ENT:  moist mucous membranes, hearing acuity intact  RESP: no respiratory distress, clear lung sounds, patient is on room air  CV: regular rhythm. Edema none in bilateral lower extremities.  M/S:   Gait and station: wheelchair bound, able to move all extremities   NEURO: no facial asymmetry, no speech deficits and able to follow directions, moves all extremities symmetrically  SKIN:  skin tear on RLE more superficial and smaller in size, moist.   PSYCH:  insight and judgement intact, memory intact, affect and mood normal    Labs:   CBC RESULTS:   Recent Labs   Lab Test 10/26/20 08/17/20   WBC  --  6.5   RBC  --  3.18*   HGB 10.2* 9.6*   HCT  --  30.4*   MCV  --  96   MCH  --  30.2   MCHC  --  31.6*   RDW  --  14.8*   PLT  --  164       Last Basic Metabolic Panel:  Recent Labs   Lab Test 02/04/21 10/26/20    141   POTASSIUM 4.7 5.5*   CHLORIDE 106 108*   LUZ 9.2 8.9   CO2 26 29   BUN 56* 56*   CR 1.94* 1.91* "    123*       Liver Function Studies -   Recent Labs   Lab Test 08/17/20   PROTTOTAL 5.8*   ALBUMIN 3.2*   BILITOTAL 0.3   ALKPHOS 122*   AST 15   ALT <9       ASSESSMENT/PLAN:  (L30.9) Dermatitis  Comment: has moist, dead skin on legs that should be debrided. Attempted to remove a scaly piece and a larger piece of skin removed and now with a skin tear. This has improved over the past few days with the use of vaseline gauze.    Plan: vaseline gauze was placed on top of the sink tear and then wrapped with gauze.  Assume we will be able to remove this week as it is almost all healed.   --apply eucerin cream at HS to LLE  --apply amlactin cream to RLE at HS    (N18.32) Stage 3b chronic kidney disease  (I10) hypertension  Comment: baseline creatinine of 1.8-1.9 and was 1.94 on 2/4/21. He reports some bradycardia when he checks his heart rate but today his rate is 62. If he becomes symptomatic, we may need to do a ziopatch to determine if he does have bradycardia. We discussed this today and he agrees to hold off on the heart monitor for now given he feels well. He is questioning if his BP/HR monitor is not working correctly.   Plan: Avoid nephrotoxic medications. Recheck BMP q6 months.   --Continue to monitor blood pressure and heart rate, adjust medications as needed.  --if becomes symptomatic, consider a ziopatch.    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 We discussed not checking his vitals unless he feels symptoms so it will decrease the anxiety.   Plan: mirtazapine 7.5 mg/HS   --citalopram 20 mg/day     --trazadone 25 mg BID    Electronically signed by:  SYBIL Milton CNP

## 2021-04-23 ENCOUNTER — RECORDS - HEALTHEAST (OUTPATIENT)
Dept: LAB | Facility: CLINIC | Age: 86
End: 2021-04-23

## 2021-04-30 DIAGNOSIS — R25.3 MUSCLE TWITCHING: ICD-10-CM

## 2021-04-30 DIAGNOSIS — F41.1 GENERALIZED ANXIETY DISORDER: ICD-10-CM

## 2021-05-01 RX ORDER — CARBIDOPA/LEVODOPA 10MG-100MG
TABLET ORAL
Qty: 56 TABLET | Status: SHIPPED | OUTPATIENT
Start: 2021-05-01 | End: 2022-05-04

## 2021-05-01 RX ORDER — MIRTAZAPINE 7.5 MG/1
TABLET, FILM COATED ORAL
Qty: 28 TABLET | Status: SHIPPED | OUTPATIENT
Start: 2021-05-01 | End: 2022-04-19

## 2021-05-01 RX ORDER — CITALOPRAM HYDROBROMIDE 20 MG/1
TABLET ORAL
Qty: 28 TABLET | Status: SHIPPED | OUTPATIENT
Start: 2021-05-01 | End: 2022-05-04

## 2021-05-01 RX ORDER — TRAZODONE HYDROCHLORIDE 50 MG/1
TABLET, FILM COATED ORAL
Qty: 28 TABLET | Status: SHIPPED | OUTPATIENT
Start: 2021-05-01 | End: 2022-01-05

## 2021-05-13 ENCOUNTER — ASSISTED LIVING VISIT (OUTPATIENT)
Dept: GERIATRICS | Facility: CLINIC | Age: 86
End: 2021-05-13
Payer: MEDICARE

## 2021-05-13 VITALS — HEIGHT: 60 IN | TEMPERATURE: 97.7 F | BODY MASS INDEX: 21.79 KG/M2 | OXYGEN SATURATION: 98 % | WEIGHT: 111 LBS

## 2021-05-13 DIAGNOSIS — I87.2 PERIPHERAL VASCULAR DISEASE WITH STASIS DERMATITIS: Primary | ICD-10-CM

## 2021-05-13 RX ORDER — ACETAMINOPHEN 325 MG/1
650 TABLET ORAL EVERY 6 HOURS PRN
COMMUNITY
End: 2021-06-10

## 2021-05-13 RX ORDER — LANOLIN ALCOHOL/MO/W.PET/CERES
CREAM (GRAM) TOPICAL 2 TIMES DAILY PRN
COMMUNITY
End: 2021-09-05

## 2021-05-13 RX ORDER — TRIAMCINOLONE ACETONIDE 1 MG/G
OINTMENT TOPICAL 2 TIMES DAILY
Qty: 80 G | Refills: 97 | COMMUNITY
Start: 2021-05-13 | End: 2022-01-12

## 2021-05-13 RX ORDER — AMMONIUM LACTATE 12 G/100G
LOTION TOPICAL AT BEDTIME
COMMUNITY
End: 2021-11-10

## 2021-05-13 ASSESSMENT — MIFFLIN-ST. JEOR: SCORE: 948.37

## 2021-05-13 NOTE — PROGRESS NOTES
"[place] [facility]    Chief Complaint   Patient presents with     RECHECK     HPI:    Ben Salvador is a 89 year old  (1/14/1928), who is being seen today for an episodic care visit at: [facility] ([facility]) [[id number]]    Temo has noted an increase of redness to his right lower extremity. He does not have pain to it but is concerned about how it looks. He continues to have scaly skin but notes that has been decreasing.     PMH/PSH reviewed in EPIC today    REVIEW OF SYSTEMS:  4 point ROS including Respiratory, CV, GI and , other than that noted in the HPI,  is negative    Temp 97.7  F (36.5  C)   Ht 1.448 m (4' 9\")   Wt 50.3 kg (111 lb)   SpO2 98%   BMI 24.02 kg/m    GENERAL APPEARANCE:  Alert, in no distress  EYES: no discharge or mattering on lids or lashes noted  ENT:  moist mucous membranes, hearing acuity intact  RESP: no respiratory distress, patient is on room air  CV: regular rhythm. Edema none in bilateral lower extremities.  M/S:   Gait and station: wheelchair bound, able to move all extremities   NEURO: no facial asymmetry, no speech deficits and able to follow directions, moves all extremities symmetrically  SKIN:  chronic jose appearance to the RLE, has some redness to it but very mild, scaly skin present but less so than in the past.   PSYCH:  insight and judgement intact, memory intact, affect and mood normal    Assessment/Plan:  (I87.2) Peripheral vascular disease with stasis dermatitis  (primary encounter diagnosis)  Comment: appears more of a jose appearance with some mild redness which Temo is anxious about.   Plan: will use triamcinolone cream BID x 3 days then return to BID PRN thereafter.     Electronically signed by:  SYBIL Milton CNP    "

## 2021-05-13 NOTE — LETTER
"    5/13/2021        RE: Ben Salvador  C/o Torin Salvador  [address] [facility] [address]        [place] [facility]    Chief Complaint   Patient presents with     RECHECK     HPI:    Ben Salvador is a 89 year old  (1/14/1928), who is being seen today for an episodic care visit at: [facility] ([facility]) [[id number]]    Temo has noted an increase of redness to his right lower extremity. He does not have pain to it but is concerned about how it looks. He continues to have scaly skin but notes that has been decreasing.     PMH/PSH reviewed in EPIC today    REVIEW OF SYSTEMS:  4 point ROS including Respiratory, CV, GI and , other than that noted in the HPI,  is negative    Temp 97.7  F (36.5  C)   Ht 1.448 m (4' 9\")   Wt 50.3 kg (111 lb)   SpO2 98%   BMI 24.02 kg/m    GENERAL APPEARANCE:  Alert, in no distress  EYES: no discharge or mattering on lids or lashes noted  ENT:  moist mucous membranes, hearing acuity intact  RESP: no respiratory distress, patient is on room air  CV: regular rhythm. Edema none in bilateral lower extremities.  M/S:   Gait and station: wheelchair bound, able to move all extremities   NEURO: no facial asymmetry, no speech deficits and able to follow directions, moves all extremities symmetrically  SKIN:  chronic jose appearance to the RLE, has some redness to it but very mild, scaly skin present but less so than in the past.   PSYCH:  insight and judgement intact, memory intact, affect and mood normal    Assessment/Plan:  (I87.2) Peripheral vascular disease with stasis dermatitis  (primary encounter diagnosis)  Comment: appears more of a jose appearance with some mild redness which Temo is anxious about.   Plan: will use triamcinolone cream BID x 3 days then return to BID PRN thereafter.     Electronically signed by:  SYBIL Milton CNP          Sincerely,        SYBIL Milton CNP      "

## 2021-06-10 DIAGNOSIS — R52 GENERALIZED PAIN: Primary | ICD-10-CM

## 2021-06-10 RX ORDER — ACETAMINOPHEN 325 MG/1
TABLET ORAL
Qty: 60 TABLET | Refills: 97 | Status: SHIPPED | OUTPATIENT
Start: 2021-06-10 | End: 2022-01-01

## 2021-07-09 ENCOUNTER — ASSISTED LIVING VISIT (OUTPATIENT)
Dept: GERIATRICS | Facility: CLINIC | Age: 86
End: 2021-07-09
Payer: MEDICARE

## 2021-07-09 VITALS — OXYGEN SATURATION: 95 % | HEIGHT: 60 IN | WEIGHT: 111 LBS | TEMPERATURE: 97.9 F | BODY MASS INDEX: 21.79 KG/M2

## 2021-07-09 DIAGNOSIS — N18.32 STAGE 3B CHRONIC KIDNEY DISEASE (H): ICD-10-CM

## 2021-07-09 DIAGNOSIS — F33.1 MODERATE EPISODE OF RECURRENT MAJOR DEPRESSIVE DISORDER (H): ICD-10-CM

## 2021-07-09 DIAGNOSIS — E44.0 MODERATE PROTEIN MALNUTRITION (H): ICD-10-CM

## 2021-07-09 DIAGNOSIS — Z00.00 ANNUAL PHYSICAL EXAM: Primary | ICD-10-CM

## 2021-07-09 DIAGNOSIS — I10 BENIGN ESSENTIAL HYPERTENSION: ICD-10-CM

## 2021-07-09 DIAGNOSIS — D51.9 ANEMIA DUE TO VITAMIN B12 DEFICIENCY, UNSPECIFIED B12 DEFICIENCY TYPE: ICD-10-CM

## 2021-07-09 DIAGNOSIS — I87.2 PERIPHERAL VASCULAR DISEASE WITH STASIS DERMATITIS: ICD-10-CM

## 2021-07-09 DIAGNOSIS — G20.C PARKINSONISM, UNSPECIFIED PARKINSONISM TYPE (H): ICD-10-CM

## 2021-07-09 DIAGNOSIS — I25.9 CHRONIC ISCHEMIC HEART DISEASE: ICD-10-CM

## 2021-07-09 DIAGNOSIS — F41.1 GENERALIZED ANXIETY DISORDER: ICD-10-CM

## 2021-07-09 ASSESSMENT — MIFFLIN-ST. JEOR: SCORE: 948.37

## 2021-07-09 NOTE — LETTER
7/9/2021        RE: Ben Salvador  C/o Torin Salvador  [address] [facility] [address]        [place] [facility]  Chief Complaint   Patient presents with     Annual Visit     [facility] Medical Record Number:  [id number]  Place of Service where encounter took place:  [facility] ([facility]) [[id number]]    HPI:    Ben Salvador  is a 89 year old  (1/14/1928), who is being seen today for an annual comprehensive visit.     Mr. Salvador was visited today while in his room, must waking for the day. He denies pain, cough or shortness of breath. No dizziness present today. He is concerned about the area on his knees that is itching. He has had this in the past. He is sleeping well. Appetite continues to be fair to poor as he does not favor the food choices here at the [place].     ALLERGIES: Patient has no known allergies.  PAST MEDICAL HISTORY:  has a past medical history of Allergic rhinitis (12/16/2004), Anxiety (12/15/2016), Asthma (12/16/2004), B12 deficiency (10/18/2017), CAD (coronary artery disease), Cataract, Chronic ischemic heart disease (6/7/2003), CKD (chronic kidney disease) stage 3, GFR 30-59 ml/min (11/4/2016), Coronary atherosclerosis (7/24/2018), Deficiency anemia (12/16/2004), Essential hypertension (6/7/2003), Generalized weakness (11/4/2016), Gout (12/16/2004), HTN (hypertension), Hyperlipidemia (6/7/2003), Hyperphoria (8/18/2016), Hypertrophy of prostate without urinary obstruction (12/16/2004), Living will on file (5/7/2019), Moderate episode of recurrent major depressive disorder (H) (2/13/2021), Myelodysplastic syndrome (H) (11/23/2006), Near syncope (11/4/2016), Nocturia, Panic attacks (12/15/2016), Pernicious anemia (6/7/2003), and Thrombocytopenia (H) (12/16/2004).  PAST SURGICAL HISTORY:  has a past surgical history that includes cataract iol, rt/lt (11/05/2013).  IMMUNIZATIONS:  Immunization History   Administered Date(s) Administered     COVID-19,PF,Moderna 01/26/2021,  02/23/2021     DTaP, Unspecified 10/12/2012     Flu, Unspecified 10/14/1993, 11/02/1994, 10/21/1996, 10/09/1997, 10/09/1998, 10/13/1999, 11/30/2000, 10/01/2001, 11/04/2002, 10/03/2003, 10/04/2004, 10/10/2005, 10/12/2006, 09/30/2008, 10/08/2009, 10/08/2010     Influenza (High Dose) 3 valent vaccine 10/07/2014, 10/08/2015, 10/18/2017, 10/01/2018, 12/20/2019     Influenza (IIV3) PF 11/02/1994, 10/21/1996, 10/09/1997, 10/09/1998, 10/13/1999, 11/30/2000, 10/01/2001, 11/04/2002, 10/04/2004, 10/10/2005, 10/12/2006, 09/30/2008, 10/08/2009, 10/08/2010, 10/11/2011, 10/11/2011, 10/12/2012, 10/12/2012, 10/06/2016     Influenza Vaccine IM > 6 months Valent IIV4 09/30/2013, 09/30/2013     Influenza Vaccine IM Ages 6-35 Months 4 Valent (PF) 09/30/2013     Influenza, Quad, High Dose, Pf, 65yr + 09/24/2020     Pneumo Conj 13-V (2010&after) 10/08/2015     Pneumococcal 23 valent 01/20/1998, 10/08/2009     Td (Adult), Adsorbed 09/13/2002     Tdap (Adacel,Boostrix) 10/12/2012     Above immunizations pulled from [facility]. [facility] and facility records also reconciled. Outstanding information sent to  to update [facility] .  Future immunizations are not needed at this point as all recommended immunizations are up to date.     Current Outpatient Medications   Medication Sig Dispense Refill     acetaminophen (TYLENOL) 325 MG tablet TAKE TWO TABLETS (650 MG) BY MOUTH EVERY 6 HOURS AS NEEDED FOR PAIN 60 tablet 97     allopurinol (ZYLOPRIM) 100 MG tablet TAKE 1 TABLET BY MOUTH ONCE DAILY 28 tablet 97     ammonium lactate (LAC-HYDRIN) 12 % external lotion Apply topically At Bedtime Apply RLE       ASPIRIN LOW DOSE 81 MG chewable tablet CHEW AND SWALLOW ONE TABLET BY MOUTH ONCE DAILY 28 tablet 97     atorvastatin (LIPITOR) 40 MG tablet TAKE 1 TABLET BY MOUTH ONCE DAILY 28 tablet 97     carbidopa-levodopa (SINEMET)  MG tablet TAKE 1 TABLET BY MOUTH TWICE DAILY IN THE MORNING & IN THE EVENING 56 tablet PRN     citalopram  (CELEXA) 20 MG tablet TAKE 1 TABLET BY MOUTH ONCE DAILY 28 tablet PRN     cyanocobalamin (VITAMIN B-12) 500 MCG tablet TAKE 1 TABLET BY MOUTH ONCE DAILY 28 tablet PRN     Eucerin external lotion Apply topically 2 times daily as needed Apply to BLEs       mirtazapine (REMERON) 7.5 MG tablet TAKE 1 TABLET BY MOUTH AT BEDTIME 28 tablet PRN     Multiple Vitamins-Minerals (CEROVITE SENIOR) TABS TAKE 1 TABLET BY MOUTH ONCE DAILY 28 tablet 97     nitroGLYcerin (NITROSTAT) 0.4 MG sublingual tablet PLACE 1 TABLET UNDER TONGUE AT ONSET OF CHEST PAIN. MAY REPEAT EVERY 5 MINUTES AS NEEDED X 3 DOSES 25 tablet 97     polyethylene glycol (MIRALAX/GLYCOLAX) packet Take 8.5 g by mouth daily as needed for constipation       traZODone (DESYREL) 50 MG tablet TAKE ONE-HALF TABLET (25MG) BY MOUTH TWICE DAILY;AND ONCE DAILY AS NEEDED FOR ANXIETY 28 tablet PRN     triamcinolone (KENALOG) 0.1 % external ointment Apply topically 2 times daily X 3 days then return to BID PRN thereafter 80 g 97     VITAMIN D3 25 MCG (1000 UT) tablet TAKE TWO TABLETS (2000 UNITS) BY MOUTH ONCE DAILY NOTE DOSAGE/STRENGTH 56 tablet 97     Case Management:  I have reviewed the Assisted Living care plan, current immunizations and preventive care/cancer screening. .Future cancer screening is not clinically indicated secondary to age/goals of care Patient's desire to return to the community is not present. Current Level of Care is appropriate.    Advance Directive Discussion:    I reviewed the current advanced directives as reflected in EPIC, the POLST and the facility chart, and verified the congruency of orders .    Team Discussion:  I communicated with the appropriate disciplines involved with the Plan of Care:   Nursing    Patient's goal is pain control and comfort.  Information reviewed:  Medications, vital signs, orders, and nursing notes.    ROS:  4 point ROS including Respiratory, CV, GI and , other than that noted in the HPI,  is negative    Vitals:  Temp  "97.9  F (36.6  C)   Ht 1.448 m (4' 9\")   Wt 50.3 kg (111 lb)   SpO2 95%   BMI 24.02 kg/m   Body mass index is 24.02 kg/m .  Exam:  GENERAL APPEARANCE:  Alert, in no distress, pleasant, cooperative, oriented x 4  EYES: no discharge or mattering on lids or lashes noted  ENT:  moist mucous membranes, hearing acuity intact  NECK: supple, symmetrical  RESP: no respiratory distress, Lung sounds clear, patient is on room air  CV:  rate and rhythm regular, no murmur. Edema none in bilateral lower extremities. VASCULAR: warm extremities without open areas.  ABDOMEN: normal bowel sounds, soft, nontender.  M/S:   Gait and station: wheelchair bound, no tenderness or swelling of the joints; able to move all extremities   SKIN:  Inspection and palpation of skin and subcutaneous tissue: two small areas of dermatitis on bilateral knees.   NEURO: no facial asymmetry, no speech deficits and able to follow directions, moves all extremities symmetrically  PSYCH:  insight and judgement intact, memory intact, affect and mood flatnormal    Lab/Diagnostic data:   CBC RESULTS: Recent Labs   Lab Test 10/26/20  1310 10/26/20  0000 08/17/20  1539 08/17/20  0000 03/05/20  0754   WBC  --   --  6.5 6.5  --    RBC  --   --  3.18* 3.18*  --    HGB 10.2* 10.2* 9.6* 9.6*   < >   HCT  --   --  30.4* 30.4*  --    MCV  --   --  96 96  --    MCH  --   --  30.2 30.2  --    MCHC  --   --  31.6* 31.6*  --    RDW  --   --  14.8* 14.8*  --    PLT  --   --  164 164  --     < > = values in this interval not displayed.       Last Basic Metabolic Panel:  Recent Labs   Lab Test 02/04/21  1108 02/04/21  0000    142   POTASSIUM 4.7 4.7   CHLORIDE 106 106   LUZ 9.2 9.2   CO2 26 26   BUN 56* 56*   CR 1.94* 1.94*    103       Liver Function Studies -   Recent Labs   Lab Test 08/17/20  1539 08/17/20  0000   PROTTOTAL 5.8* 5.8*   ALBUMIN 3.2* 3.2*   BILITOTAL 0.3 0.3   ALKPHOS 122* 122*   AST 15 15   ALT <9 <9       TSH   Date Value Ref Range Status "   01/16/2020 2.56 0.30 - 5.00 uIU/mL Final       ASSESSMENT/PLAN  (I25.9) Chronic ischemic heart disease  (I10) Essential HTN  Comment: h/o MI, last EF 45% with inferior and inferolateral hypokinesis. Based on JNC-8 goals,  patients age of 89 year old, no presence of diabetes or CKD, and goals of care goal BP is <150/90 mm Hg. Patient is stable with current plan of care and routine assessment..  Plan: remains on daily ASA and statin for secondary prevention.         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 Unable to obtain weight as Temo will not leave room to use the wheelchair scale but am eager to see if there has been a loss.   Plan:   --continue mirtazapine for appetite stimulation  --boost supplement one time per day.   --staff to attempt to get a weight.       (D51.9) Anemia due to vitamin B12 deficiency, unspecified B12 deficiency type  Comment: macrocytic anemia with a baseline range of 9-10  Plan: continue B12 replacement  --recheck CBC annually.      (N18.32) Stage 3b chronic kidney disease  Comment: baseline creatinine of 1.8-1.9 and was 1.94 on 2/4/21  Plan: Avoid nephrotoxic medications. Recheck BMP q6 months.      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 Has component of paranoia, he and rodney typically do not leave the apartment with the exception of getting their hair cut.    Plan: mirtazapine 7.5 mg/HS   --citalopram 20 mg/day     --trazadone 25 mg BID (have asked to decrease this but he would like to continue as is since he feels well on this regimen.      (G20) Parkinsonism, unspecified Parkinsonism type (H)    Comment: LE tremors and freezing episodes have subsided after starting the carvidopa.   Plan: Sinemet 10/100 BID.   --increase as needed.   (I87.2) Peripheral vascular disease with stasis dermatitis  (primary encounter diagnosis)  Comment:  appears more of a jose appearance with some mild redness which Temo is anxious about.   Plan: will use triamcinolone cream BID x 3 days then return to BID PRN thereafter.       Electronically signed by:  SYBIL Milton CNP            Sincerely,        SYBIL Milton CNP

## 2021-07-09 NOTE — Clinical Note
[facility] Ger[facility]  Health Care Home  Patient Care Plan  About Me  Patient Name:  Ben Salvador    YOB: 1928  Age:   89 year old   [place] MRN: [id number] Telephone Information:   Home Phone [phone]   Mobile [phone]       Address:    C/o Ann Salvador  [address]  [place] [address] Email address:  [email or web address]      Emergency Contact(s)  Name Relationship Lgl Grd Work Phone Home Phone Mobile Phone   1. KIRK SALVADOR Spouse No  [phone]   2. ANN SALVADOR Son No  [phone]   3. HUME, SUSAN Daughter No  [phone]           Primary language:  English     needed?     [facility]:  [phone] op. 1  Other communication barriers:    Current living arrangement:    Mobility Status/ Medical Equipment:    Other information to know about me:      Health Maintenance  Immunizations:     Cancer Screening:       My Access Plan  Medical Emergency 911   Primary Clinic Line     24/7 After Hours Line [phone]   Preferred Hospital     Preferred Pharmacy [facility] - [address]     Behavioral Health Crisis Line Crisis Connection, [phone] or 911     My Care Team Members  Patient Care Team       Relationship Specialty Notifications Start End    Trinidad Cobian APRN CNP PCP - General Nurse Practitioner - Gerontology All results, Admissions 2/3/20     Phone: [phone] Fax: [phone]         [address] [place] [address]    Trinidad Cobian APRN CNP Assigned PCP   1/9/20     Phone: [phone] Fax: [phone]         [address] [place] [address]    Yessenia Coffman MD MD Internal Medicine  2/14/20     Phone: [phone] Fax: [phone]         [address] [facility] [address]    Krysten Appiah Other (see comments) Geriatric Medicine All results, Admissions 4/5/21     Geriatric Services Team  Coordinator           My Care Plans  Self Management and Treatment Plan      Action Plans on File:    Advance Care Plans/Directives on file:                My Medical and Care Information  Problem List   Patient Active Problem List   Diagnosis    Allergic rhinitis    Anxiety    Asthma    B12 deficiency    Chronic ischemic heart disease    CKD (chronic kidney disease) stage 3, GFR 30-59 ml/min    Coronary atherosclerosis    Deficiency anemia    Essential hypertension    Generalized weakness    Gout    Hyperlipidemia    Hyperphoria    Hypertrophy of prostate without urinary obstruction    Living will on file    Myelodysplastic syndrome (H)    Near syncope    Panic attacks    Pernicious anemia    Thrombocytopenia (H)    Moderate episode of recurrent major depressive disorder (H)      Current Medications and Allergies:  See printed Medication Report.    Health Care Home Complexity Tier:      Care Coordination Start Date:     Form Last Updated: 07/12/2021

## 2021-07-09 NOTE — PROGRESS NOTES
[place] [facility]  Chief Complaint   Patient presents with     Annual Visit     [facility] Medical Record Number:  [id number]  Place of Service where encounter took place:  [facility] ([facility]) [[id number]]    HPI:    Ben Salvador  is a 89 year old  (1/14/1928), who is being seen today for an annual comprehensive visit.     Mr. Salvador was visited today while in his room, must waking for the day. He denies pain, cough or shortness of breath. No dizziness present today. He is concerned about the area on his knees that is itching. He has had this in the past. He is sleeping well. Appetite continues to be fair to poor as he does not favor the food choices here at the [place].     ALLERGIES: Patient has no known allergies.  PAST MEDICAL HISTORY:  has a past medical history of Allergic rhinitis (12/16/2004), Anxiety (12/15/2016), Asthma (12/16/2004), B12 deficiency (10/18/2017), CAD (coronary artery disease), Cataract, Chronic ischemic heart disease (6/7/2003), CKD (chronic kidney disease) stage 3, GFR 30-59 ml/min (11/4/2016), Coronary atherosclerosis (7/24/2018), Deficiency anemia (12/16/2004), Essential hypertension (6/7/2003), Generalized weakness (11/4/2016), Gout (12/16/2004), HTN (hypertension), Hyperlipidemia (6/7/2003), Hyperphoria (8/18/2016), Hypertrophy of prostate without urinary obstruction (12/16/2004), Living will on file (5/7/2019), Moderate episode of recurrent major depressive disorder (H) (2/13/2021), Myelodysplastic syndrome (H) (11/23/2006), Near syncope (11/4/2016), Nocturia, Panic attacks (12/15/2016), Pernicious anemia (6/7/2003), and Thrombocytopenia (H) (12/16/2004).  PAST SURGICAL HISTORY:  has a past surgical history that includes cataract iol, rt/lt (11/05/2013).  IMMUNIZATIONS:  Immunization History   Administered Date(s) Administered     COVID-19,PF,Moderna 01/26/2021, 02/23/2021     DTaP, Unspecified 10/12/2012     Flu, Unspecified 10/14/1993, 11/02/1994, 10/21/1996,  10/09/1997, 10/09/1998, 10/13/1999, 11/30/2000, 10/01/2001, 11/04/2002, 10/03/2003, 10/04/2004, 10/10/2005, 10/12/2006, 09/30/2008, 10/08/2009, 10/08/2010     Influenza (High Dose) 3 valent vaccine 10/07/2014, 10/08/2015, 10/18/2017, 10/01/2018, 12/20/2019     Influenza (IIV3) PF 11/02/1994, 10/21/1996, 10/09/1997, 10/09/1998, 10/13/1999, 11/30/2000, 10/01/2001, 11/04/2002, 10/04/2004, 10/10/2005, 10/12/2006, 09/30/2008, 10/08/2009, 10/08/2010, 10/11/2011, 10/11/2011, 10/12/2012, 10/12/2012, 10/06/2016     Influenza Vaccine IM > 6 months Valent IIV4 09/30/2013, 09/30/2013     Influenza Vaccine IM Ages 6-35 Months 4 Valent (PF) 09/30/2013     Influenza, Quad, High Dose, Pf, 65yr + 09/24/2020     Pneumo Conj 13-V (2010&after) 10/08/2015     Pneumococcal 23 valent 01/20/1998, 10/08/2009     Td (Adult), Adsorbed 09/13/2002     Tdap (Adacel,Boostrix) 10/12/2012     Above immunizations pulled from [facility]. [facility] and facility records also reconciled. Outstanding information sent to  to update [facility] .  Future immunizations are not needed at this point as all recommended immunizations are up to date.     Current Outpatient Medications   Medication Sig Dispense Refill     acetaminophen (TYLENOL) 325 MG tablet TAKE TWO TABLETS (650 MG) BY MOUTH EVERY 6 HOURS AS NEEDED FOR PAIN 60 tablet 97     allopurinol (ZYLOPRIM) 100 MG tablet TAKE 1 TABLET BY MOUTH ONCE DAILY 28 tablet 97     ammonium lactate (LAC-HYDRIN) 12 % external lotion Apply topically At Bedtime Apply RLE       ASPIRIN LOW DOSE 81 MG chewable tablet CHEW AND SWALLOW ONE TABLET BY MOUTH ONCE DAILY 28 tablet 97     atorvastatin (LIPITOR) 40 MG tablet TAKE 1 TABLET BY MOUTH ONCE DAILY 28 tablet 97     carbidopa-levodopa (SINEMET)  MG tablet TAKE 1 TABLET BY MOUTH TWICE DAILY IN THE MORNING & IN THE EVENING 56 tablet PRN     citalopram (CELEXA) 20 MG tablet TAKE 1 TABLET BY MOUTH ONCE DAILY 28 tablet PRN     cyanocobalamin (VITAMIN  "B-12) 500 MCG tablet TAKE 1 TABLET BY MOUTH ONCE DAILY 28 tablet PRN     Eucerin external lotion Apply topically 2 times daily as needed Apply to BLEs       mirtazapine (REMERON) 7.5 MG tablet TAKE 1 TABLET BY MOUTH AT BEDTIME 28 tablet PRN     Multiple Vitamins-Minerals (CEROVITE SENIOR) TABS TAKE 1 TABLET BY MOUTH ONCE DAILY 28 tablet 97     nitroGLYcerin (NITROSTAT) 0.4 MG sublingual tablet PLACE 1 TABLET UNDER TONGUE AT ONSET OF CHEST PAIN. MAY REPEAT EVERY 5 MINUTES AS NEEDED X 3 DOSES 25 tablet 97     polyethylene glycol (MIRALAX/GLYCOLAX) packet Take 8.5 g by mouth daily as needed for constipation       traZODone (DESYREL) 50 MG tablet TAKE ONE-HALF TABLET (25MG) BY MOUTH TWICE DAILY;AND ONCE DAILY AS NEEDED FOR ANXIETY 28 tablet PRN     triamcinolone (KENALOG) 0.1 % external ointment Apply topically 2 times daily X 3 days then return to BID PRN thereafter 80 g 97     VITAMIN D3 25 MCG (1000 UT) tablet TAKE TWO TABLETS (2000 UNITS) BY MOUTH ONCE DAILY NOTE DOSAGE/STRENGTH 56 tablet 97     Case Management:  I have reviewed the Assisted Living care plan, current immunizations and preventive care/cancer screening. .Future cancer screening is not clinically indicated secondary to age/goals of care Patient's desire to return to the community is not present. Current Level of Care is appropriate.    Advance Directive Discussion:    I reviewed the current advanced directives as reflected in EPIC, the POLST and the facility chart, and verified the congruency of orders .    Team Discussion:  I communicated with the appropriate disciplines involved with the Plan of Care:   Nursing    Patient's goal is pain control and comfort.  Information reviewed:  Medications, vital signs, orders, and nursing notes.    ROS:  4 point ROS including Respiratory, CV, GI and , other than that noted in the HPI,  is negative    Vitals:  Temp 97.9  F (36.6  C)   Ht 1.448 m (4' 9\")   Wt 50.3 kg (111 lb)   SpO2 95%   BMI 24.02 kg/m   " Body mass index is 24.02 kg/m .  Exam:  GENERAL APPEARANCE:  Alert, in no distress, pleasant, cooperative, oriented x 4  EYES: no discharge or mattering on lids or lashes noted  ENT:  moist mucous membranes, hearing acuity intact  NECK: supple, symmetrical  RESP: no respiratory distress, Lung sounds clear, patient is on room air  CV:  rate and rhythm regular, no murmur. Edema none in bilateral lower extremities. VASCULAR: warm extremities without open areas.  ABDOMEN: normal bowel sounds, soft, nontender.  M/S:   Gait and station: wheelchair bound, no tenderness or swelling of the joints; able to move all extremities   SKIN:  Inspection and palpation of skin and subcutaneous tissue: two small areas of dermatitis on bilateral knees.   NEURO: no facial asymmetry, no speech deficits and able to follow directions, moves all extremities symmetrically  PSYCH:  insight and judgement intact, memory intact, affect and mood flatnormal    Lab/Diagnostic data:   CBC RESULTS: Recent Labs   Lab Test 10/26/20  1310 10/26/20  0000 08/17/20  1539 08/17/20  0000 03/05/20  0754   WBC  --   --  6.5 6.5  --    RBC  --   --  3.18* 3.18*  --    HGB 10.2* 10.2* 9.6* 9.6*   < >   HCT  --   --  30.4* 30.4*  --    MCV  --   --  96 96  --    MCH  --   --  30.2 30.2  --    MCHC  --   --  31.6* 31.6*  --    RDW  --   --  14.8* 14.8*  --    PLT  --   --  164 164  --     < > = values in this interval not displayed.       Last Basic Metabolic Panel:  Recent Labs   Lab Test 02/04/21  1108 02/04/21  0000    142   POTASSIUM 4.7 4.7   CHLORIDE 106 106   LUZ 9.2 9.2   CO2 26 26   BUN 56* 56*   CR 1.94* 1.94*    103       Liver Function Studies -   Recent Labs   Lab Test 08/17/20  1539 08/17/20  0000   PROTTOTAL 5.8* 5.8*   ALBUMIN 3.2* 3.2*   BILITOTAL 0.3 0.3   ALKPHOS 122* 122*   AST 15 15   ALT <9 <9       TSH   Date Value Ref Range Status   01/16/2020 2.56 0.30 - 5.00 uIU/mL Final       ASSESSMENT/PLAN  (I25.9) Chronic ischemic heart  disease  (I10) Essential HTN  Comment: h/o MI, last EF 45% with inferior and inferolateral hypokinesis. Based on JNC-8 goals,  patients age of 89 year old, no presence of diabetes or CKD, and goals of care goal BP is <150/90 mm Hg. Patient is stable with current plan of care and routine assessment..  Plan: remains on daily ASA and statin for secondary prevention.         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 Unable to obtain weight as Temo will not leave room to use the wheelchair scale but am eager to see if there has been a loss.   Plan:   --continue mirtazapine for appetite stimulation  --boost supplement one time per day.   --staff to attempt to get a weight.       (D51.9) Anemia due to vitamin B12 deficiency, unspecified B12 deficiency type  Comment: macrocytic anemia with a baseline range of 9-10  Plan: continue B12 replacement  --recheck CBC annually.      (N18.32) Stage 3b chronic kidney disease  Comment: baseline creatinine of 1.8-1.9 and was 1.94 on 2/4/21  Plan: Avoid nephrotoxic medications. Recheck BMP q6 months.      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 Has component of paranoia, he and rodney typically do not leave the apartment with the exception of getting their hair cut.    Plan: mirtazapine 7.5 mg/HS   --citalopram 20 mg/day     --trazadone 25 mg BID (have asked to decrease this but he would like to continue as is since he feels well on this regimen.      (G20) Parkinsonism, unspecified Parkinsonism type (H)    Comment: LE tremors and freezing episodes have subsided after starting the carvidopa.   Plan: Sinemet 10/100 BID.   --increase as needed.   (I87.2) Peripheral vascular disease with stasis dermatitis  (primary encounter diagnosis)  Comment: appears more of a jose appearance with some mild redness which Temo is anxious about.   Plan:  will use triamcinolone cream BID x 3 days then return to BID PRN thereafter.       Electronically signed by:  SYBIL Milton CNP

## 2021-09-03 DIAGNOSIS — L30.9 DERMATITIS: Primary | ICD-10-CM

## 2021-09-05 RX ORDER — LANOLIN ALCOHOL/MO/W.PET/CERES
CREAM (GRAM) TOPICAL
Qty: 500 ML | Refills: 97 | Status: SHIPPED | OUTPATIENT
Start: 2021-09-05 | End: 2023-01-01

## 2021-09-08 ENCOUNTER — LAB REQUISITION (OUTPATIENT)
Dept: LAB | Facility: CLINIC | Age: 86
End: 2021-09-08
Payer: MEDICARE

## 2021-09-08 DIAGNOSIS — U07.1 COVID-19: ICD-10-CM

## 2021-09-08 PROCEDURE — U0003 INFECTIOUS AGENT DETECTION BY NUCLEIC ACID (DNA OR RNA); SEVERE ACUTE RESPIRATORY SYNDROME CORONAVIRUS 2 (SARS-COV-2) (CORONAVIRUS DISEASE [COVID-19]), AMPLIFIED PROBE TECHNIQUE, MAKING USE OF HIGH THROUGHPUT TECHNOLOGIES AS DESCRIBED BY CMS-2020-01-R: HCPCS | Mod: ORL | Performed by: INTERNAL MEDICINE

## 2021-09-09 LAB — SARS-COV-2 RNA RESP QL NAA+PROBE: NEGATIVE

## 2021-09-16 PROCEDURE — U0003 INFECTIOUS AGENT DETECTION BY NUCLEIC ACID (DNA OR RNA); SEVERE ACUTE RESPIRATORY SYNDROME CORONAVIRUS 2 (SARS-COV-2) (CORONAVIRUS DISEASE [COVID-19]), AMPLIFIED PROBE TECHNIQUE, MAKING USE OF HIGH THROUGHPUT TECHNOLOGIES AS DESCRIBED BY CMS-2020-01-R: HCPCS | Mod: ORL | Performed by: INTERNAL MEDICINE

## 2021-09-17 ENCOUNTER — LAB REQUISITION (OUTPATIENT)
Dept: LAB | Facility: CLINIC | Age: 86
End: 2021-09-17
Payer: MEDICARE

## 2021-09-17 DIAGNOSIS — U07.1 COVID-19: ICD-10-CM

## 2021-09-20 LAB — SARS-COV-2 RNA RESP QL NAA+PROBE: NOT DETECTED

## 2021-10-10 ENCOUNTER — HEALTH MAINTENANCE LETTER (OUTPATIENT)
Age: 86
End: 2021-10-10

## 2021-11-10 ENCOUNTER — ASSISTED LIVING VISIT (OUTPATIENT)
Dept: GERIATRICS | Facility: CLINIC | Age: 86
End: 2021-11-10
Payer: MEDICARE

## 2021-11-10 VITALS — TEMPERATURE: 97.9 F | WEIGHT: 111 LBS | BODY MASS INDEX: 24.02 KG/M2

## 2021-11-10 DIAGNOSIS — D64.9 ANEMIA, UNSPECIFIED TYPE: ICD-10-CM

## 2021-11-10 DIAGNOSIS — N18.32 STAGE 3B CHRONIC KIDNEY DISEASE (H): ICD-10-CM

## 2021-11-10 DIAGNOSIS — E44.0 MODERATE PROTEIN MALNUTRITION (H): ICD-10-CM

## 2021-11-10 DIAGNOSIS — R41.89 COGNITIVE IMPAIRMENT: ICD-10-CM

## 2021-11-10 DIAGNOSIS — F41.1 GENERALIZED ANXIETY DISORDER: ICD-10-CM

## 2021-11-10 DIAGNOSIS — E53.8 VITAMIN B12 DEFICIENCY: ICD-10-CM

## 2021-11-10 DIAGNOSIS — G20.C PARKINSONISM, UNSPECIFIED PARKINSONISM TYPE (H): Primary | ICD-10-CM

## 2021-11-10 DIAGNOSIS — I87.2 PERIPHERAL VASCULAR DISEASE WITH STASIS DERMATITIS: ICD-10-CM

## 2021-11-10 DIAGNOSIS — I10 ESSENTIAL HYPERTENSION: ICD-10-CM

## 2021-11-10 DIAGNOSIS — I25.9 CHRONIC ISCHEMIC HEART DISEASE: ICD-10-CM

## 2021-11-10 DIAGNOSIS — F33.1 MODERATE EPISODE OF RECURRENT MAJOR DEPRESSIVE DISORDER (H): ICD-10-CM

## 2021-11-10 NOTE — LETTER
11/10/2021        RE: Ben Salvador  C/o Torin Salvador  [address] [address]        [place]  [place] Medical Record Number:  [id number]  Place of Service where encounter took place:  [facility] ([facility]) [[id number]]  Chief Complaint   Patient presents with     RECHECK       HPI:    Ben Salvador  is a 89 year old (1/14/1928), who is being seen today for an episodic care visit.  HPI information obtained from: facility chart records, facility staff, patient report and [facility] chart review.  He and his wife have lived at this facility since 1/2020. Medical history significant for Parkinsonism, depression, anxiety, CAD with MI and stent 2003,  CKD stage 3, BPH, HTN, recurrent falls, vitamin B12 deficiency, anemia, gout.        Today's concern is:     Parkinsonism, unspecified Parkinsonism type (H)  Moderate episode of recurrent major depressive disorder (H)  Generalized anxiety disorder  Moderate protein malnutrition (H)  Chronic ischemic heart disease  Peripheral vascular disease with stasis dermatitis  Essential hypertension  Stage 3b chronic kidney disease (H)  Anemia, unspecified type  Vitamin B12 deficiency  Cognitive impairment   He is a fairly good historian. Reports feeling well with no specific concerns. Denies pain. Reports his appetite is fair, but he thinks he's getting enough to eat.States  he had an open area on his right leg that has healed. Uses a wheelchair to get around the apartment and reports he worries about falling. Staff assists with cares.     Past Medical and Surgical History reviewed in Epic today.    MEDICATIONS:    Current Outpatient Medications   Medication Sig Dispense Refill     acetaminophen (TYLENOL) 325 MG tablet TAKE TWO TABLETS (650 MG) BY MOUTH EVERY 6 HOURS AS NEEDED FOR PAIN 60 tablet 97     allopurinol (ZYLOPRIM) 100 MG tablet TAKE 1 TABLET BY MOUTH ONCE DAILY 28 tablet 97     ASPIRIN LOW DOSE 81 MG chewable tablet CHEW AND  SWALLOW ONE TABLET BY MOUTH ONCE DAILY 28 tablet 97     atorvastatin (LIPITOR) 40 MG tablet TAKE 1 TABLET BY MOUTH ONCE DAILY 28 tablet 97     carbidopa-levodopa (SINEMET)  MG tablet TAKE 1 TABLET BY MOUTH TWICE DAILY IN THE MORNING & IN THE EVENING 56 tablet PRN     citalopram (CELEXA) 20 MG tablet TAKE 1 TABLET BY MOUTH ONCE DAILY 28 tablet PRN     cyanocobalamin (VITAMIN B-12) 500 MCG tablet TAKE 1 TABLET BY MOUTH ONCE DAILY 28 tablet PRN     Eucerin external lotion APPLY TOPICALLY TO BILATERAL LOWER EXTREMITIES AS DIRECTED TWICE DAILY 500 mL 97     mirtazapine (REMERON) 7.5 MG tablet TAKE 1 TABLET BY MOUTH AT BEDTIME 28 tablet PRN     Multiple Vitamins-Minerals (CEROVITE SENIOR) TABS TAKE 1 TABLET BY MOUTH ONCE DAILY 28 tablet 97     nitroGLYcerin (NITROSTAT) 0.4 MG sublingual tablet PLACE 1 TABLET UNDER TONGUE AT ONSET OF CHEST PAIN. MAY REPEAT EVERY 5 MINUTES AS NEEDED X 3 DOSES 25 tablet 97     polyethylene glycol (MIRALAX) 17 GM/Dose powder MIX 8.5GM OF POWDER IN 8 OZ OF WATER UNTIL COMPLETELY DISSOLVED. DRINK SOLUTION BY MOUTH ONCE DAILY AS NEEDED FOR CONSTIPATION 238 g 97     traZODone (DESYREL) 50 MG tablet TAKE ONE-HALF TABLET (25MG) BY MOUTH TWICE DAILY;AND ONCE DAILY AS NEEDED FOR ANXIETY 28 tablet PRN     triamcinolone (KENALOG) 0.1 % external ointment Apply topically 2 times daily X 3 days then return to BID PRN thereafter 80 g 97     VITAMIN D3 25 MCG (1000 UT) tablet TAKE TWO TABLETS (2000 UNITS) BY MOUTH ONCE DAILY **NOTE DOSAGE/STRENGTH** 56 tablet 97       REVIEW OF SYSTEMS:  4 point ROS including Respiratory, CV, GI and , other than that noted in the HPI,  is negative    Objective:  Temp 97.9  F (36.6  C)   Wt 50.3 kg (111 lb)   BMI 24.02 kg/m    Exam:  GENERAL APPEARANCE:  Alert, in no distress, thin  ENT:  [place], oropharynx clear  EYES:  EOM normal, conjunctiva and lids normal  NECK: no adenopathy,masses or thyromegaly  RESP:  lungs clear to auscultation , no respiratory distress  CV:   regular rate and rhythm, no murmur, no edema, +2 pedal pulses  ABDOMEN:  soft, non-tender, no distension, no masses  M/S:   wheelchair. MA with good strength. No joint inflammation  SKIN: stasis discoloration both LE, no open areas. No rashes  PSYCH:  oriented X 3, memory impaired , affect and mood normal    Labs:   Recent labs in [facility] reviewed by me today.     ASSESSMENT / PLAN:  (G20) Parkinsonism, unspecified Parkinsonism type (H)  (primary encounter diagnosis)  Comment: LE tremors and freezing improved with sinemet.   Plan: continue sinemet. Wheelchair for mobility  Will update daughter re: plan of care.     (F33.1) Moderate episode of recurrent major depressive disorder (H)  (F41.1) Generalized anxiety disorder  Comment: pleasant and talkative today. He and his wife isolate in their apt and don't socialize. Mobility and functional status limited by his anxiety and fear of falling.   Plan: continue citalopram, mirtazapine, trazodone.     (E44.0) Moderate protein malnutrition (H)  Comment: no weights to review (he won't leave the apt to be weighed). Denies GI symptoms and feels that his weight is stable   Plan: continue mirtazapine. Continue nutritional supplements. Obtain weight if able.     (I25.9) Chronic ischemic heart disease  Comment: history of MI with stent placement in 2003. No acute cardiac symptoms.   Most recent ECHO found on chart review is from 11/5/2016 and showed EF 45%  Plan: continue ASA and statin     (I87.2) Peripheral vascular disease with stasis dermatitis  Comment: chronic discoloration of both LE is unchanged per his report. No edema and skin intact   Plan: continue moisturizer to LE and closely monitor skin     (I10) Essential hypertension  Comment: recent /67. He is not on antihypertensives   Plan: monitor VS    (N18.32) Stage 3b chronic kidney disease (H)  Comment: baseline creatinine ~1.9.   Plan: avoid nephrotoxins. Obtain labs in the near future-will discuss with him at next  visit.     (D64.9) Anemia, unspecified type  (E53.8) Vitamin B12 deficiency   Comment: anemia likely multifactorial, history of macrocytosis.  Baseline Hgb appears to be 9.5-10.2. No s/s of active bleeding   Plan: continue B12 supplement. Check labs in the near future.     Cognitive impairment:  Comment: SLUMS 25/30 while on tcu in 2019. Conversation is appropriate today  Plan: AL staff to assist with meals, cares and med admin.         Electronically signed by:  SYBIL Petersen CNP                 Sincerely,        SYBIL Petersen CNP

## 2021-11-10 NOTE — LETTER
11/10/2021        RE: Ben Salvador  C/o Torin Salvador  [address] [facility] [address] [facility] [address]        [place] [facility]  [place] Medical Record Number:  [id number]  Place of Service where encounter took place:  [facility] ([facility]) [[id number]]  Chief Complaint   Patient presents with     RECHECK       HPI:    Ben Salvador  is a 89 year old (1/14/1928), who is being seen today for an episodic care visit.  HPI information obtained from: facility chart records, facility staff, patient report and [facility] chart review.  He and his wife have lived at this facility since 1/2020. Medical history significant for Parkinsonism, depression, anxiety, CAD with MI and stent 2003,  CKD stage 3, BPH, HTN, recurrent falls, vitamin B12 deficiency, anemia, gout.        Today's concern is:     Parkinsonism, unspecified Parkinsonism type (H)  Moderate episode of recurrent major depressive disorder (H)  Generalized anxiety disorder  Moderate protein malnutrition (H)  Chronic ischemic heart disease  Peripheral vascular disease with stasis dermatitis  Essential hypertension  Stage 3b chronic kidney disease (H)  Anemia, unspecified type  Vitamin B12 deficiency  Cognitive impairment   He is a fairly good historian. Reports feeling well with no specific concerns. Denies pain. Reports his appetite is fair, but he thinks he's getting enough to eat.States  he had an open area on his right leg that has healed. Uses a wheelchair to get around the apartment and reports he worries about falling. Staff assists with cares.     Past Medical and Surgical History reviewed in Epic today.    MEDICATIONS:    Current Outpatient Medications   Medication Sig Dispense Refill     acetaminophen (TYLENOL) 325 MG tablet TAKE TWO TABLETS (650 MG) BY MOUTH EVERY 6 HOURS AS NEEDED FOR PAIN 60 tablet 97     allopurinol (ZYLOPRIM) 100 MG tablet TAKE 1 TABLET BY MOUTH ONCE DAILY 28 tablet 97     ASPIRIN LOW DOSE 81 MG chewable tablet CHEW AND  SWALLOW ONE TABLET BY MOUTH ONCE DAILY 28 tablet 97     atorvastatin (LIPITOR) 40 MG tablet TAKE 1 TABLET BY MOUTH ONCE DAILY 28 tablet 97     carbidopa-levodopa (SINEMET)  MG tablet TAKE 1 TABLET BY MOUTH TWICE DAILY IN THE MORNING & IN THE EVENING 56 tablet PRN     citalopram (CELEXA) 20 MG tablet TAKE 1 TABLET BY MOUTH ONCE DAILY 28 tablet PRN     cyanocobalamin (VITAMIN B-12) 500 MCG tablet TAKE 1 TABLET BY MOUTH ONCE DAILY 28 tablet PRN     Eucerin external lotion APPLY TOPICALLY TO BILATERAL LOWER EXTREMITIES AS DIRECTED TWICE DAILY 500 mL 97     mirtazapine (REMERON) 7.5 MG tablet TAKE 1 TABLET BY MOUTH AT BEDTIME 28 tablet PRN     Multiple Vitamins-Minerals (CEROVITE SENIOR) TABS TAKE 1 TABLET BY MOUTH ONCE DAILY 28 tablet 97     nitroGLYcerin (NITROSTAT) 0.4 MG sublingual tablet PLACE 1 TABLET UNDER TONGUE AT ONSET OF CHEST PAIN. MAY REPEAT EVERY 5 MINUTES AS NEEDED X 3 DOSES 25 tablet 97     polyethylene glycol (MIRALAX) 17 GM/Dose powder MIX 8.5GM OF POWDER IN 8 OZ OF WATER UNTIL COMPLETELY DISSOLVED. DRINK SOLUTION BY MOUTH ONCE DAILY AS NEEDED FOR CONSTIPATION 238 g 97     traZODone (DESYREL) 50 MG tablet TAKE ONE-HALF TABLET (25MG) BY MOUTH TWICE DAILY;AND ONCE DAILY AS NEEDED FOR ANXIETY 28 tablet PRN     triamcinolone (KENALOG) 0.1 % external ointment Apply topically 2 times daily X 3 days then return to BID PRN thereafter 80 g 97     VITAMIN D3 25 MCG (1000 UT) tablet TAKE TWO TABLETS (2000 UNITS) BY MOUTH ONCE DAILY ***NOTE DOSAGE/STRENGTH*** 56 tablet 97       REVIEW OF SYSTEMS:  4 point ROS including Respiratory, CV, GI and , other than that noted in the HPI,  is negative    Objective:  Temp 97.9  F (36.6  C)   Wt 50.3 kg (111 lb)   BMI 24.02 kg/m    Exam:  GENERAL APPEARANCE:  Alert, in no distress, thin  ENT:  [place], oropharynx clear  EYES:  EOM normal, conjunctiva and lids normal  NECK: no adenopathy,masses or thyromegaly  RESP:  lungs clear to auscultation , no respiratory  distress  CV:  regular rate and rhythm, no murmur, no edema, +2 pedal pulses  ABDOMEN:  soft, non-tender, no distension, no masses  M/S:   wheelchair. MA with good strength. No joint inflammation  SKIN: stasis discoloration both LE, no open areas. No rashes  PSYCH:  oriented X 3, memory impaired , affect and mood normal    Labs:   Recent labs in [facility] reviewed by me today.     ASSESSMENT / PLAN:  (G20) Parkinsonism, unspecified Parkinsonism type (H)  (primary encounter diagnosis)  Comment: LE tremors and freezing improved with sinemet.   Plan: continue sinemet. Wheelchair for mobility  Will update daughter re: plan of care.     (F33.1) Moderate episode of recurrent major depressive disorder (H)  (F41.1) Generalized anxiety disorder  Comment: pleasant and talkative today. He and his wife isolate in their apt and don't socialize. Mobility and functional status limited by his anxiety and fear of falling.   Plan: continue citalopram, mirtazapine, trazodone.     (E44.0) Moderate protein malnutrition (H)  Comment: no weights to review (he won't leave the apt to be weighed). Denies GI symptoms and feels that his weight is stable   Plan: continue mirtazapine. Continue nutritional supplements. Obtain weight if able.     (I25.9) Chronic ischemic heart disease  Comment: history of MI with stent placement in 2003. No acute cardiac symptoms.   Most recent ECHO found on chart review is from 11/5/2016 and showed EF 45%  Plan: continue ASA and statin     (I87.2) Peripheral vascular disease with stasis dermatitis  Comment: chronic discoloration of both LE is unchanged per his report. No edema and skin intact   Plan: continue moisturizer to LE and closely monitor skin     (I10) Essential hypertension  Comment: recent /67. He is not on antihypertensives   Plan: monitor VS    (N18.32) Stage 3b chronic kidney disease (H)  Comment: baseline creatinine ~1.9.   Plan: avoid nephrotoxins. Obtain labs in the near future-will discuss  with him at next visit.     (D64.9) Anemia, unspecified type  (E53.8) Vitamin B12 deficiency   Comment: anemia likely multifactorial, history of macrocytosis.  Baseline Hgb appears to be 9.5-10.2. No s/s of active bleeding   Plan: continue B12 supplement. Check labs in the near future.     Cognitive impairment:  Comment: SLUMS 25/30 while on tcu in 2019. Conversation is appropriate today  Plan: AL staff to assist with meals, cares and med admin.         Electronically signed by:  SYBIL Petersen CNP                 Sincerely,        SYBIL Petersen CNP

## 2021-11-10 NOTE — PROGRESS NOTES
[facility]  [place] Medical Record Number:  [id number]  Place of Service where encounter took place:  [facility] ([facility]) [[id number]]  Chief Complaint   Patient presents with     RECHECK       HPI:    Ben Salvador  is a 89 year old (1/14/1928), who is being seen today for an episodic care visit.  HPI information obtained from: facility chart records, facility staff, patient report and [facility] chart review.  He and his wife have lived at this facility since 1/2020. Medical history significant for Parkinsonism, depression, anxiety, CAD with MI and stent 2003,  CKD stage 3, BPH, HTN, recurrent falls, vitamin B12 deficiency, anemia, gout.        Today's concern is:     Parkinsonism, unspecified Parkinsonism type (H)  Moderate episode of recurrent major depressive disorder (H)  Generalized anxiety disorder  Moderate protein malnutrition (H)  Chronic ischemic heart disease  Peripheral vascular disease with stasis dermatitis  Essential hypertension  Stage 3b chronic kidney disease (H)  Anemia, unspecified type  Vitamin B12 deficiency  Cognitive impairment   He is a fairly good historian. Reports feeling well with no specific concerns. Denies pain. Reports his appetite is fair, but he thinks he's getting enough to eat.States  he had an open area on his right leg that has healed. Uses a wheelchair to get around the apartment and reports he worries about falling. Staff assists with cares.     Past Medical and Surgical History reviewed in Epic today.    MEDICATIONS:    Current Outpatient Medications   Medication Sig Dispense Refill     acetaminophen (TYLENOL) 325 MG tablet TAKE TWO TABLETS (650 MG) BY MOUTH EVERY 6 HOURS AS NEEDED FOR PAIN 60 tablet 97     allopurinol (ZYLOPRIM) 100 MG tablet TAKE 1 TABLET BY MOUTH ONCE DAILY 28 tablet 97     ASPIRIN LOW DOSE 81 MG chewable tablet CHEW AND SWALLOW ONE TABLET BY MOUTH ONCE DAILY 28 tablet 97     atorvastatin (LIPITOR) 40 MG tablet TAKE 1 TABLET  BY MOUTH ONCE DAILY 28 tablet 97     carbidopa-levodopa (SINEMET)  MG tablet TAKE 1 TABLET BY MOUTH TWICE DAILY IN THE MORNING & IN THE EVENING 56 tablet PRN     citalopram (CELEXA) 20 MG tablet TAKE 1 TABLET BY MOUTH ONCE DAILY 28 tablet PRN     cyanocobalamin (VITAMIN B-12) 500 MCG tablet TAKE 1 TABLET BY MOUTH ONCE DAILY 28 tablet PRN     Eucerin external lotion APPLY TOPICALLY TO BILATERAL LOWER EXTREMITIES AS DIRECTED TWICE DAILY 500 mL 97     mirtazapine (REMERON) 7.5 MG tablet TAKE 1 TABLET BY MOUTH AT BEDTIME 28 tablet PRN     Multiple Vitamins-Minerals (CEROVITE SENIOR) TABS TAKE 1 TABLET BY MOUTH ONCE DAILY 28 tablet 97     nitroGLYcerin (NITROSTAT) 0.4 MG sublingual tablet PLACE 1 TABLET UNDER TONGUE AT ONSET OF CHEST PAIN. MAY REPEAT EVERY 5 MINUTES AS NEEDED X 3 DOSES 25 tablet 97     polyethylene glycol (MIRALAX) 17 GM/Dose powder MIX 8.5GM OF POWDER IN 8 OZ OF WATER UNTIL COMPLETELY DISSOLVED. DRINK SOLUTION BY MOUTH ONCE DAILY AS NEEDED FOR CONSTIPATION 238 g 97     traZODone (DESYREL) 50 MG tablet TAKE ONE-HALF TABLET (25MG) BY MOUTH TWICE DAILY;AND ONCE DAILY AS NEEDED FOR ANXIETY 28 tablet PRN     triamcinolone (KENALOG) 0.1 % external ointment Apply topically 2 times daily X 3 days then return to BID PRN thereafter 80 g 97     VITAMIN D3 25 MCG (1000 UT) tablet TAKE TWO TABLETS (2000 UNITS) BY MOUTH ONCE DAILY **NOTE DOSAGE/STRENGTH** 56 tablet 97       REVIEW OF SYSTEMS:  4 point ROS including Respiratory, CV, GI and , other than that noted in the HPI,  is negative    Objective:  Temp 97.9  F (36.6  C)   Wt 50.3 kg (111 lb)   BMI 24.02 kg/m    Exam:  GENERAL APPEARANCE:  Alert, in no distress, thin  ENT:  [place], oropharynx clear  EYES:  EOM normal, conjunctiva and lids normal  NECK: no adenopathy,masses or thyromegaly  RESP:  lungs clear to auscultation , no respiratory distress  CV:  regular rate and rhythm, no murmur, no edema, +2 pedal pulses  ABDOMEN:  soft, non-tender, no  distension, no masses  M/S:   wheelchair. MA with good strength. No joint inflammation  SKIN: stasis discoloration both LE, no open areas. No rashes  PSYCH:  oriented X 3, memory impaired , affect and mood normal    Labs:   Recent labs in [facility] reviewed by me today.     ASSESSMENT / PLAN:  (G20) Parkinsonism, unspecified Parkinsonism type (H)  (primary encounter diagnosis)  Comment: LE tremors and freezing improved with sinemet.   Plan: continue sinemet. Wheelchair for mobility  Will update daughter re: plan of care.     (F33.1) Moderate episode of recurrent major depressive disorder (H)  (F41.1) Generalized anxiety disorder  Comment: pleasant and talkative today. He and his wife isolate in their apt and don't socialize. Mobility and functional status limited by his anxiety and fear of falling.   Plan: continue citalopram, mirtazapine, trazodone.     (E44.0) Moderate protein malnutrition (H)  Comment: no weights to review (he won't leave the apt to be weighed). Denies GI symptoms and feels that his weight is stable   Plan: continue mirtazapine. Continue nutritional supplements. Obtain weight if able.     (I25.9) Chronic ischemic heart disease  Comment: history of MI with stent placement in 2003. No acute cardiac symptoms.   Most recent ECHO found on chart review is from 11/5/2016 and showed EF 45%  Plan: continue ASA and statin     (I87.2) Peripheral vascular disease with stasis dermatitis  Comment: chronic discoloration of both LE is unchanged per his report. No edema and skin intact   Plan: continue moisturizer to LE and closely monitor skin     (I10) Essential hypertension  Comment: recent /67. He is not on antihypertensives   Plan: monitor VS    (N18.32) Stage 3b chronic kidney disease (H)  Comment: baseline creatinine ~1.9.   Plan: avoid nephrotoxins. Obtain labs in the near future-will discuss with him at next visit.     (D64.9) Anemia, unspecified type  (E53.8) Vitamin B12 deficiency   Comment: anemia  likely multifactorial, history of macrocytosis.  Baseline Hgb appears to be 9.5-10.2. No s/s of active bleeding   Plan: continue B12 supplement. Check labs in the near future.     Cognitive impairment:  Comment: SLUMS 25/30 while on tcu in 2019. Conversation is appropriate today  Plan: AL staff to assist with meals, cares and med admin.         Electronically signed by:  SYBIL Petersen CNP

## 2021-12-05 ENCOUNTER — HEALTH MAINTENANCE LETTER (OUTPATIENT)
Age: 86
End: 2021-12-05

## 2021-12-24 ENCOUNTER — TELEPHONE (OUTPATIENT)
Dept: GERIATRICS | Facility: CLINIC | Age: 86
End: 2021-12-24
Payer: COMMERCIAL

## 2021-12-24 DIAGNOSIS — L03.119 CELLULITIS OF LOWER EXTREMITY, UNSPECIFIED LATERALITY: Primary | ICD-10-CM

## 2021-12-24 RX ORDER — CEPHALEXIN 500 MG/1
500 CAPSULE ORAL 3 TIMES DAILY
Qty: 15 CAPSULE | Refills: 0 | Status: SHIPPED | OUTPATIENT
Start: 2021-12-24 | End: 2021-12-29

## 2021-12-24 NOTE — TELEPHONE ENCOUNTER
AL nurse called reporting new open areas of both LE. Wounds with drainage, surrounding erythema and tenderness with palpation. Afebrile and does not feel ill.     Order given for cephalexin 500 mg tid for 5 days for cellulitis. Script sent to pharmacy   Daily wound care using ABD and kerlix to secure.         SYBIL Petersen CNP on 12/24/2021 at 1:08 PM

## 2021-12-28 VITALS — TEMPERATURE: 97 F | BODY MASS INDEX: 24.02 KG/M2 | WEIGHT: 111 LBS

## 2021-12-29 ENCOUNTER — ASSISTED LIVING VISIT (OUTPATIENT)
Dept: GERIATRICS | Facility: CLINIC | Age: 86
End: 2021-12-29
Payer: MEDICARE

## 2021-12-29 DIAGNOSIS — I87.2 PERIPHERAL VASCULAR DISEASE WITH STASIS DERMATITIS: ICD-10-CM

## 2021-12-29 DIAGNOSIS — S81.809A MULTIPLE OPENS WOUND OF LOWER EXTREMITY, UNSPECIFIED LATERALITY, INITIAL ENCOUNTER: Primary | ICD-10-CM

## 2021-12-29 DIAGNOSIS — L03.116 CELLULITIS OF LEFT LOWER EXTREMITY: ICD-10-CM

## 2021-12-29 DIAGNOSIS — G20.C PARKINSONISM, UNSPECIFIED PARKINSONISM TYPE (H): ICD-10-CM

## 2021-12-29 DIAGNOSIS — F41.1 GENERALIZED ANXIETY DISORDER: ICD-10-CM

## 2021-12-29 DIAGNOSIS — R41.89 COGNITIVE IMPAIRMENT: ICD-10-CM

## 2021-12-29 DIAGNOSIS — E44.0 MODERATE PROTEIN MALNUTRITION (H): ICD-10-CM

## 2021-12-29 DIAGNOSIS — F33.1 MODERATE EPISODE OF RECURRENT MAJOR DEPRESSIVE DISORDER (H): ICD-10-CM

## 2021-12-29 NOTE — PROGRESS NOTES
Documentation of Face-to-Face and Certification for Home Health Services     Patient: Ben Salvador   YOB: 1928  MR Number: [id number]  Today's Date: 12/29/2021    I certify that patient: Ben Salvador is under my care and that I, or a nurse practitioner or physician's assistant working with me, had a face-to-face encounter that meets the physician face-to-face encounter requirements with this patient on: 12/29/2021.    This encounter with the patient was in whole, or in part, for the following medical condition, which is the primary reason for home health care: open areas both LE, PVD.    I certify that, based on my findings, the following services are medically necessary home health services: Nursing.    My clinical findings support the need for the above services because: Nurse is needed: To assess VS, LE wounds, cellulitis after changes in medications or other medical regimen., To provide assessment and oversight required in the home to assure adherence to the medical plan due to: risk for hospitalization. and To provide caregiver training to assist with: wound managment..    Further, I certify that my clinical findings support that this patient is homebound (i.e. absences from home require considerable and taxing effort and are for medical reasons or [facility] or infrequently or of short duration when for other reasons) because: Requires assistance of another person or specialized equipment to access medical services because patient: Is unable to exit home safely on own due to: wheelchair bound. and Requires supervision of another for safe transfer...    Based on the above findings. I certify that this patient is confined to the home and needs intermittent skilled nursing care, physical therapy and/or speech therapy.  The patient is under my care, and I have initiated the establishment of the plan of care.  This patient will be followed by a physician who will periodically review  the plan of care.  Physician/Provider to provide follow up care: Mariano Estes    Responsible Medicare certified [facility] Physician: Dr.Gretchen Augustus MD, signing F2F and only signing for initial order. Please send all follow up questions and concerns or needed follow up signatures to the PCP, who [facility] has on file as:  Mariano Estes.  Physician Signature: See electronic signature associated with these discharge orders.  Date: 12/29/2021    Electronically signed,   Yessenia Coffman MD   December 29, 2021

## 2021-12-29 NOTE — LETTER
"    12/29/2021        RE: Ben Salvador  C/o Torin Salvador  [address] [facility] [address]        [facility]  [place] Medical Record Number:  [id number]  Place of Service where encounter took place:  [facility] [[id number]]  Chief Complaint   Patient presents with     RECHECK       HPI:    Ben Salvador  is a 89 year old (1/14/1928), who is being seen today for an episodic care visit.  HPI information obtained from: facility chart records, facility staff, patient report and [facility] chart review.   He and his wife have lived at this facility since 1/2020. Medical history significant for Parkinsonism, depression, anxiety, CAD with MI and stent 2003,  CKD stage 3, BPH, HTN, recurrent falls, vitamin B12 deficiency, anemia, gout.       Today's concern is:     Multiple opens wound of lower extremity, unspecified laterality, initial encounter  Peripheral vascular disease with stasis dermatitis  Cellulitis of left lower extremity  Parkinsonism, unspecified Parkinsonism type (H)  Moderate episode of recurrent major depressive disorder (H)  Generalized anxiety disorder  Moderate protein malnutrition (H)  Cognitive impairment     He is seen today for evaluation of open wounds of both LE. These were first reported by staff on 12/24/2021 and cephalexin was started for cellulitis. Patient reports the wounds are from an aide \"scrubbing\" his legs too hard during a bath. Reports the LLE is painful with wound care, but improving. No significant pain of the RLE. He's had open LE wounds in the past that were slow to heal. He is otherwise feeling well. Denies fever or chills. Good appetite. Wheelchair bound, able to stand for transfers and toileting. He and his wife rarely leave their apt.       Past Medical and Surgical History reviewed in Epic today.    MEDICATIONS:    Current Outpatient Medications   Medication Sig Dispense Refill     acetaminophen " (TYLENOL) 325 MG tablet TAKE TWO TABLETS (650 MG) BY MOUTH EVERY 6 HOURS AS NEEDED FOR PAIN 60 tablet 97     allopurinol (ZYLOPRIM) 100 MG tablet TAKE 1 TABLET BY MOUTH ONCE DAILY 28 tablet 97     ASPIRIN LOW DOSE 81 MG chewable tablet CHEW AND SWALLOW ONE TABLET BY MOUTH ONCE DAILY 28 tablet 97     atorvastatin (LIPITOR) 40 MG tablet TAKE 1 TABLET BY MOUTH ONCE DAILY 28 tablet 97     carbidopa-levodopa (SINEMET)  MG tablet TAKE 1 TABLET BY MOUTH TWICE DAILY IN THE MORNING & IN THE EVENING 56 tablet PRN     cephALEXin (KEFLEX) 500 MG capsule Take 1 capsule (500 mg) by mouth 3 times daily for 5 days 15 capsule 0     citalopram (CELEXA) 20 MG tablet TAKE 1 TABLET BY MOUTH ONCE DAILY 28 tablet PRN     cyanocobalamin (VITAMIN B-12) 500 MCG tablet TAKE 1 TABLET BY MOUTH ONCE DAILY 28 tablet PRN     Eucerin external lotion APPLY TOPICALLY TO BILATERAL LOWER EXTREMITIES AS DIRECTED TWICE DAILY 500 mL 97     mirtazapine (REMERON) 7.5 MG tablet TAKE 1 TABLET BY MOUTH AT BEDTIME 28 tablet PRN     Multiple Vitamins-Minerals (CEROVITE SENIOR) TABS TAKE 1 TABLET BY MOUTH ONCE DAILY 28 tablet 97     nitroGLYcerin (NITROSTAT) 0.4 MG sublingual tablet PLACE 1 TABLET UNDER TONGUE AT ONSET OF CHEST PAIN. MAY REPEAT EVERY 5 MINUTES AS NEEDED X 3 DOSES 25 tablet 97     polyethylene glycol (MIRALAX) 17 GM/Dose powder MIX 8.5GM OF POWDER IN 8 OZ OF WATER UNTIL COMPLETELY DISSOLVED. DRINK SOLUTION BY MOUTH ONCE DAILY AS NEEDED FOR CONSTIPATION 238 g 97     traZODone (DESYREL) 50 MG tablet TAKE ONE-HALF TABLET (25MG) BY MOUTH TWICE DAILY;AND ONCE DAILY AS NEEDED FOR ANXIETY 28 tablet PRN     triamcinolone (KENALOG) 0.1 % external ointment Apply topically 2 times daily X 3 days then return to BID PRN thereafter 80 g 97     VITAMIN D3 25 MCG (1000 UT) tablet TAKE TWO TABLETS (2000 UNITS) BY MOUTH ONCE DAILY  56 tablet 97       REVIEW OF SYSTEMS:  4 point ROS including Respiratory, CV, GI and , other than that noted in the HPI,  is  negative    Objective:  Temp 97  F (36.1  C)   Wt 50.3 kg (111 lb)   BMI 24.02 kg/m    Exam:  GENERAL APPEARANCE:  Alert, in no distress, thin  ENT:  [place], oropharynx clear  EYES:  EOM normal, conjunctiva and lids normal  NECK: no adenopathy,masses or thyromegaly  RESP:  lungs clear to auscultation , no respiratory distress  CV:  regular rate and rhythm, no murmur, no edema, +2 pedal pulses  ABDOMEN:  soft, non-tender, no distension, no masses  M/S:   wheelchair. MA with good strength. No joint inflammation  SKIN: multiple open areas of both shins L>R  with small to moderate amount of bloody drainage. All wounds are superficial with granulation. LLE erythematous and warm to touch. Stasis discoloration both LE  PSYCH:  oriented X 3, memory impaired , affect and mood normal    Labs:   Recent labs in [facility] reviewed by me today.     ASSESSMENT / PLAN:  (S81.516I) Multiple opens wound of lower extremity, unspecified laterality, initial encounter  (primary encounter diagnosis)  (I87.2) Peripheral vascular disease with stasis dermatitis  (L03.116) Cellulitis of left lower extremity  Comment: multiple wounds of both LE. Reportedly due to trauma, compounded by PVD. LLE appears to have ongoing cellulitis, no signs of infection of RLE.   Plan: continue cephalexin for an additional 5 days (10 days total). Daily wound care using adaptic, ABD and secure with kerlix. Refer to home care for wound management.     (G20) Parkinsonism, unspecified Parkinsonism type (H)  Comment: wheelchair for mobility due to LE tremors and freezing. Activity is limited by his fear of falling   Plan: continue sinemet.     (F33.1) Moderate episode of recurrent major depressive disorder (H)  (F41.1) Generalized anxiety disorder  Comment: pleasant and talkative. Appears in good spirits, sleeping well.   Plan: continue citalopram, mirtazapine, trazodone     (E44.0) Moderate protein malnutrition (H)  Comment: he won't leave his apt to be weighed.  Reports  good appetite   Plan: continue mirtazapine. Nutritional supplements.     (R4.48) Cognitive impairment  Comment: appears to have mild deficits. Conversation is appropriate. SLUMS 25/30 while on tcu in 2019  Plan: AL staff assistance with meals, cares, med admin         Electronically signed by:  SYBIL Petersen CNP                 Documentation of Face-to-Face and Certification for Home Health Services     Patient: Ben Salvador   YOB: 1928  MR Number: [id number]  Today's Date: 12/29/2021    I certify that patient: Ben Salvador is under my care and that I, or a nurse practitioner or physician's assistant working with me, had a face-to-face encounter that meets the physician face-to-face encounter requirements with this patient on: 12/29/2021.    This encounter with the patient was in whole, or in part, for the following medical condition, which is the primary reason for home health care: open areas both LE, PVD.    I certify that, based on my findings, the following services are medically necessary home health services: Nursing.    My clinical findings support the need for the above services because: Nurse is needed: To assess VS, LE wounds, cellulitis after changes in medications or other medical regimen., To provide assessment and oversight required in the home to assure adherence to the medical plan due to: risk for hospitalization. and To provide caregiver training to assist with: wound managment..    Further, I certify that my clinical findings support that this patient is homebound (i.e. absences from home require considerable and taxing effort and are for medical reasons or Yazdanism services or infrequently or of short duration when for other reasons) because: Requires assistance of another person or specialized equipment to access medical services because patient: Is unable to exit home safely on own due to: wheelchair bound. and Requires supervision of another for safe  transfer...    Based on the above findings. I certify that this patient is confined to the home and needs intermittent skilled nursing care, physical therapy and/or speech therapy.  The patient is under my care, and I have initiated the establishment of the plan of care.  This patient will be followed by a physician who will periodically review the plan of care.  Physician/Provider to provide follow up care: Mariano Estes    Responsible Medicare certified PECOS Physician: Dr.Gretchen Augustus MD, signing F2F and only signing for initial order. Please send all follow up questions and concerns or needed follow up signatures to the PCP, who [facility] has on file as:  Mariano Estes.  Physician Signature: See electronic signature associated with these discharge orders.  Date: 12/29/2021      [facility] [facility]  [facility] Record Number:  [id number]  Place of Service where encounter took place:  [facility]) [[id number]]  Chief Complaint   Patient presents with     RECHECK       HPI:    Ben Salvador  is a 89 year old (1/14/1928), who is being seen today for an episodic care visit.  HPI information obtained from: facility chart records, facility staff, patient report and [facility] chart review.   He and his wife have lived at this facility since 1/2020. Medical history significant for Parkinsonism, depression, anxiety, CAD with MI and stent 2003,  CKD stage 3, BPH, HTN, recurrent falls, vitamin B12 deficiency, anemia, gout.       Today's concern is:     Multiple opens wound of lower extremity, unspecified laterality, initial encounter  Peripheral vascular disease with stasis dermatitis  Cellulitis of left lower extremity  Parkinsonism, unspecified Parkinsonism type (H)  Moderate episode of recurrent major depressive disorder (H)  Generalized anxiety disorder  Moderate protein malnutrition (H)  Cognitive impairment     He is seen today for evaluation of open wounds of both LE.  "These were first reported by staff on 12/24/2021 and cephalexin was started for cellulitis. Patient reports the wounds are from an aide \"scrubbing\" his legs too hard during a bath. Reports the LLE is painful with wound care, but improving. No significant pain of the RLE. He's had open LE wounds in the past that were slow to heal. He is otherwise feeling well. Denies fever or chills. Good appetite. Wheelchair bound, able to stand for transfers and toileting. He and his wife rarely leave their apt.       Past Medical and Surgical History reviewed in Epic today.    MEDICATIONS:    Current Outpatient Medications   Medication Sig Dispense Refill     acetaminophen (TYLENOL) 325 MG tablet TAKE TWO TABLETS (650 MG) BY MOUTH EVERY 6 HOURS AS NEEDED FOR PAIN 60 tablet 97     allopurinol (ZYLOPRIM) 100 MG tablet TAKE 1 TABLET BY MOUTH ONCE DAILY 28 tablet 97     ASPIRIN LOW DOSE 81 MG chewable tablet CHEW AND SWALLOW ONE TABLET BY MOUTH ONCE DAILY 28 tablet 97     atorvastatin (LIPITOR) 40 MG tablet TAKE 1 TABLET BY MOUTH ONCE DAILY 28 tablet 97     carbidopa-levodopa (SINEMET)  MG tablet TAKE 1 TABLET BY MOUTH TWICE DAILY IN THE MORNING & IN THE EVENING 56 tablet PRN     cephALEXin (KEFLEX) 500 MG capsule Take 1 capsule (500 mg) by mouth 3 times daily for 5 days 15 capsule 0     citalopram (CELEXA) 20 MG tablet TAKE 1 TABLET BY MOUTH ONCE DAILY 28 tablet PRN     cyanocobalamin (VITAMIN B-12) 500 MCG tablet TAKE 1 TABLET BY MOUTH ONCE DAILY 28 tablet PRN     Eucerin external lotion APPLY TOPICALLY TO BILATERAL LOWER EXTREMITIES AS DIRECTED TWICE DAILY 500 mL 97     mirtazapine (REMERON) 7.5 MG tablet TAKE 1 TABLET BY MOUTH AT BEDTIME 28 tablet PRN     Multiple Vitamins-Minerals (CEROVITE SENIOR) TABS TAKE 1 TABLET BY MOUTH ONCE DAILY 28 tablet 97     nitroGLYcerin (NITROSTAT) 0.4 MG sublingual tablet PLACE 1 TABLET UNDER TONGUE AT ONSET OF CHEST PAIN. MAY REPEAT EVERY 5 MINUTES AS NEEDED X 3 DOSES 25 tablet 97     " polyethylene glycol (MIRALAX) 17 GM/Dose powder MIX 8.5GM OF POWDER IN 8 OZ OF WATER UNTIL COMPLETELY DISSOLVED. DRINK SOLUTION BY MOUTH ONCE DAILY AS NEEDED FOR CONSTIPATION 238 g 97     traZODone (DESYREL) 50 MG tablet TAKE ONE-HALF TABLET (25MG) BY MOUTH TWICE DAILY;AND ONCE DAILY AS NEEDED FOR ANXIETY 28 tablet PRN     triamcinolone (KENALOG) 0.1 % external ointment Apply topically 2 times daily X 3 days then return to BID PRN thereafter 80 g 97     VITAMIN D3 25 MCG (1000 UT) tablet TAKE TWO TABLETS (2000 UNITS) BY MOUTH ONCE DAILY  56 tablet 97       REVIEW OF SYSTEMS:  4 point ROS including Respiratory, CV, GI and , other than that noted in the HPI,  is negative    Objective:  Temp 97  F (36.1  C)   Wt 50.3 kg (111 lb)   BMI 24.02 kg/m    Exam:  GENERAL APPEARANCE:  Alert, in no distress, thin  ENT:  [place], oropharynx clear  EYES:  EOM normal, conjunctiva and lids normal  NECK: no adenopathy,masses or thyromegaly  RESP:  lungs clear to auscultation , no respiratory distress  CV:  regular rate and rhythm, no murmur, no edema, +2 pedal pulses  ABDOMEN:  soft, non-tender, no distension, no masses  M/S:   wheelchair. MA with good strength. No joint inflammation  SKIN: multiple open areas of both shins L>R  with small to moderate amount of bloody drainage. All wounds are superficial with granulation. LLE erythematous and warm to touch. Stasis discoloration both LE  PSYCH:  oriented X 3, memory impaired , affect and mood normal    Labs:   Recent labs in [facility] reviewed by me today.     ASSESSMENT / PLAN:  (S57.446G) Multiple opens wound of lower extremity, unspecified laterality, initial encounter  (primary encounter diagnosis)  (I87.2) Peripheral vascular disease with stasis dermatitis  (L03.116) Cellulitis of left lower extremity  Comment: multiple wounds of both LE. Reportedly due to trauma, compounded by PVD. LLE appears to have ongoing cellulitis, no signs of infection of RLE.   Plan: continue cephalexin for  an additional 5 days (10 days total). Daily wound care using adaptic, ABD and secure with kerlix. Refer to home care for wound management.     (G20) Parkinsonism, unspecified Parkinsonism type (H)  Comment: wheelchair for mobility due to LE tremors and freezing. Activity is limited by his fear of falling   Plan: continue sinemet.     (F33.1) Moderate episode of recurrent major depressive disorder (H)  (F41.1) Generalized anxiety disorder  Comment: pleasant and talkative. Appears in good spirits, sleeping well.   Plan: continue citalopram, mirtazapine, trazodone     (E44.0) Moderate protein malnutrition (H)  Comment: he won't leave his apt to be weighed.  Reports good appetite   Plan: continue mirtazapine. Nutritional supplements.     (R41.89) Cognitive impairment  Comment: appears to have mild deficits. Conversation is appropriate. SLUMS 25/30 while on tcu in 2019  Plan: AL staff assistance with meals, cares, med admin         Electronically signed by:  Adam Ordoñez                 Sincerely,        SYBIL Petersen CNP

## 2021-12-29 NOTE — LETTER
"    12/29/2021        RE: Ben Salvador  C/o Torin Salvador  [address] [facility] [address]        [place] [facility]  [facility] Record Number:  [id number]  Place of Service where encounter took place:  [facility] [[id number]]  Chief Complaint   Patient presents with     RECHECK       HPI:    Ben Salvador  is a 89 year old (1/14/1928), who is being seen today for an episodic care visit.  HPI information obtained from: facility chart records, facility staff, patient report and [facility] chart review.   He and his wife have lived at this facility since 1/2020. Medical history significant for Parkinsonism, depression, anxiety, CAD with MI and stent 2003,  CKD stage 3, BPH, HTN, recurrent falls, vitamin B12 deficiency, anemia, gout.       Today's concern is:     Multiple opens wound of lower extremity, unspecified laterality, initial encounter  Peripheral vascular disease with stasis dermatitis  Cellulitis of left lower extremity  Parkinsonism, unspecified Parkinsonism type (H)  Moderate episode of recurrent major depressive disorder (H)  Generalized anxiety disorder  Moderate protein malnutrition (H)  Cognitive impairment     He is seen today for evaluation of open wounds of both LE. These were first reported by staff on 12/24/2021 and cephalexin was started for cellulitis. Patient reports the wounds are from an aide \"scrubbing\" his legs too hard during a bath. Reports the LLE is painful with wound care, but improving. No significant pain of the RLE. He's had open LE wounds in the past that were slow to heal. He is otherwise feeling well. Denies fever or chills. Good appetite. Wheelchair bound, able to stand for transfers and toileting. He and his wife rarely leave their apt.       Past Medical and Surgical History reviewed in Epic today.    MEDICATIONS:    Current Outpatient Medications   Medication Sig Dispense Refill     acetaminophen " (TYLENOL) 325 MG tablet TAKE TWO TABLETS (650 MG) BY MOUTH EVERY 6 HOURS AS NEEDED FOR PAIN 60 tablet 97     allopurinol (ZYLOPRIM) 100 MG tablet TAKE 1 TABLET BY MOUTH ONCE DAILY 28 tablet 97     ASPIRIN LOW DOSE 81 MG chewable tablet CHEW AND SWALLOW ONE TABLET BY MOUTH ONCE DAILY 28 tablet 97     atorvastatin (LIPITOR) 40 MG tablet TAKE 1 TABLET BY MOUTH ONCE DAILY 28 tablet 97     carbidopa-levodopa (SINEMET)  MG tablet TAKE 1 TABLET BY MOUTH TWICE DAILY IN THE MORNING & IN THE EVENING 56 tablet PRN     cephALEXin (KEFLEX) 500 MG capsule Take 1 capsule (500 mg) by mouth 3 times daily for 5 days 15 capsule 0     citalopram (CELEXA) 20 MG tablet TAKE 1 TABLET BY MOUTH ONCE DAILY 28 tablet PRN     cyanocobalamin (VITAMIN B-12) 500 MCG tablet TAKE 1 TABLET BY MOUTH ONCE DAILY 28 tablet PRN     Eucerin external lotion APPLY TOPICALLY TO BILATERAL LOWER EXTREMITIES AS DIRECTED TWICE DAILY 500 mL 97     mirtazapine (REMERON) 7.5 MG tablet TAKE 1 TABLET BY MOUTH AT BEDTIME 28 tablet PRN     Multiple Vitamins-Minerals (CEROVITE SENIOR) TABS TAKE 1 TABLET BY MOUTH ONCE DAILY 28 tablet 97     nitroGLYcerin (NITROSTAT) 0.4 MG sublingual tablet PLACE 1 TABLET UNDER TONGUE AT ONSET OF CHEST PAIN. MAY REPEAT EVERY 5 MINUTES AS NEEDED X 3 DOSES 25 tablet 97     polyethylene glycol (MIRALAX) 17 GM/Dose powder MIX 8.5GM OF POWDER IN 8 OZ OF WATER UNTIL COMPLETELY DISSOLVED. DRINK SOLUTION BY MOUTH ONCE DAILY AS NEEDED FOR CONSTIPATION 238 g 97     traZODone (DESYREL) 50 MG tablet TAKE ONE-HALF TABLET (25MG) BY MOUTH TWICE DAILY;AND ONCE DAILY AS NEEDED FOR ANXIETY 28 tablet PRN     triamcinolone (KENALOG) 0.1 % external ointment Apply topically 2 times daily X 3 days then return to BID PRN thereafter 80 g 97     VITAMIN D3 25 MCG (1000 UT) tablet TAKE TWO TABLETS (2000 UNITS) BY MOUTH ONCE DAILY ***NOTE DOSAGE/STRENGTH*** 56 tablet 97       REVIEW OF SYSTEMS:  4 point ROS including Respiratory, CV, GI and , other than  that noted in the HPI,  is negative    Objective:  Temp 97  F (36.1  C)   Wt 50.3 kg (111 lb)   BMI 24.02 kg/m    Exam:  GENERAL APPEARANCE:  Alert, in no distress, thin  ENT:  [place], oropharynx clear  EYES:  EOM normal, conjunctiva and lids normal  NECK: no adenopathy,masses or thyromegaly  RESP:  lungs clear to auscultation , no respiratory distress  CV:  regular rate and rhythm, no murmur, no edema, +2 pedal pulses  ABDOMEN:  soft, non-tender, no distension, no masses  M/S:   wheelchair. MA with good strength. No joint inflammation  SKIN: multiple open areas of both shins L>R  with small to moderate amount of bloody drainage. All wounds are superficial with granulation. LLE erythematous and warm to touch. Stasis discoloration both LE  PSYCH:  oriented X 3, memory impaired , affect and mood normal    Labs:   Recent labs in [facility] reviewed by me today.     ASSESSMENT / PLAN:  (S83.773H) Multiple opens wound of lower extremity, unspecified laterality, initial encounter  (primary encounter diagnosis)  (I87.2) Peripheral vascular disease with stasis dermatitis  (L03.116) Cellulitis of left lower extremity  Comment: multiple wounds of both LE. Reportedly due to trauma, compounded by PVD. LLE appears to have ongoing cellulitis, no signs of infection of RLE.   Plan: continue cephalexin for an additional 5 days (10 days total). Daily wound care using adaptic, ABD and secure with kerlix. Refer to home care for wound management.     (G20) Parkinsonism, unspecified Parkinsonism type (H)  Comment: wheelchair for mobility due to LE tremors and freezing. Activity is limited by his fear of falling   Plan: continue sinemet.     (F33.1) Moderate episode of recurrent major depressive disorder (H)  (F41.1) Generalized anxiety disorder  Comment: pleasant and talkative. Appears in good spirits, sleeping well.   Plan: continue citalopram, mirtazapine, trazodone     (E44.0) Moderate protein malnutrition (H)  Comment: he won't leave his  apt to be weighed.  Reports good appetite   Plan: continue mirtazapine. Nutritional supplements.     (R43.62) Cognitive impairment  Comment: appears to have mild deficits. Conversation is appropriate. SLUMS 25/30 while on tcu in 2019  Plan: AL staff assistance with meals, cares, med admin         Electronically signed by:  SYBIL Petersen CNP                 Documentation of Face-to-Face and Certification for Home Health Services     Patient: Ben Salvador   YOB: 1928  MR Number: [id number]  Today's Date: 12/29/2021    I certify that patient: Ben Salvador is under my care and that I, or a nurse practitioner or physician's assistant working with me, had a face-to-face encounter that meets the physician face-to-face encounter requirements with this patient on: 12/29/2021.    This encounter with the patient was in whole, or in part, for the following medical condition, which is the primary reason for home health care: open areas both LE, PVD.    I certify that, based on my findings, the following services are medically necessary home health services: Nursing.    My clinical findings support the need for the above services because: Nurse is needed: To assess VS, LE wounds, cellulitis after changes in medications or other medical regimen., To provide assessment and oversight required in the home to assure adherence to the medical plan due to: risk for hospitalization. and To provide caregiver training to assist with: wound managment..    Further, I certify that my clinical findings support that this patient is homebound (i.e. absences from home require considerable and taxing effort and are for medical reasons or Buddhist services or infrequently or of short duration when for other reasons) because: Requires assistance of another person or specialized equipment to access medical services because patient: Is unable to exit home safely on own due to: wheelchair bound. and Requires supervision  of another for safe transfer...    Based on the above findings. I certify that this patient is confined to the home and needs intermittent skilled nursing care, physical therapy and/or speech therapy.  The patient is under my care, and I have initiated the establishment of the plan of care.  This patient will be followed by a physician who will periodically review the plan of care.  Physician/Provider to provide follow up care: Mariano Estes    Responsible Medicare certified PECOS Physician: Dr.Gretchen Augustus MD, signing F2F and only signing for initial order. Please send all follow up questions and concerns or needed follow up signatures to the PCP, who [facility] has on file as:  Mariano Estes.  Physician Signature: See electronic signature associated with these discharge orders.  Date: 12/29/2021          Sincerely,        SYBIL Petersen CNP

## 2021-12-29 NOTE — LETTER
"    12/29/2021        RE: Ben Salvador  C/o Torin Salvador  [address] [facility] [address]        [facility]  [place] Medical Record Number:  [id number]  Place of Service where encounter took place:  [facility] [[id number]]  Chief Complaint   Patient presents with     RECHECK       HPI:    Ben Salvador  is a 89 year old (1/14/1928), who is being seen today for an episodic care visit.  HPI information obtained from: facility chart records, facility staff, patient report and [facility] chart review.   He and his wife have lived at this facility since 1/2020. Medical history significant for Parkinsonism, depression, anxiety, CAD with MI and stent 2003,  CKD stage 3, BPH, HTN, recurrent falls, vitamin B12 deficiency, anemia, gout.       Today's concern is:     Multiple opens wound of lower extremity, unspecified laterality, initial encounter  Peripheral vascular disease with stasis dermatitis  Cellulitis of left lower extremity  Parkinsonism, unspecified Parkinsonism type (H)  Moderate episode of recurrent major depressive disorder (H)  Generalized anxiety disorder  Moderate protein malnutrition (H)  Cognitive impairment     He is seen today for evaluation of open wounds of both LE. These were first reported by staff on 12/24/2021 and cephalexin was started for cellulitis. Patient reports the wounds are from an aide \"scrubbing\" his legs too hard during a bath. Reports the LLE is painful with wound care, but improving. No significant pain of the RLE. He's had open LE wounds in the past that were slow to heal. He is otherwise feeling well. Denies fever or chills. Good appetite. Wheelchair bound, able to stand for transfers and toileting. He and his wife rarely leave their apt.       Past Medical and Surgical History reviewed in Epic today.    MEDICATIONS:    Current Outpatient Medications   Medication Sig Dispense Refill     acetaminophen " (TYLENOL) 325 MG tablet TAKE TWO TABLETS (650 MG) BY MOUTH EVERY 6 HOURS AS NEEDED FOR PAIN 60 tablet 97     allopurinol (ZYLOPRIM) 100 MG tablet TAKE 1 TABLET BY MOUTH ONCE DAILY 28 tablet 97     ASPIRIN LOW DOSE 81 MG chewable tablet CHEW AND SWALLOW ONE TABLET BY MOUTH ONCE DAILY 28 tablet 97     atorvastatin (LIPITOR) 40 MG tablet TAKE 1 TABLET BY MOUTH ONCE DAILY 28 tablet 97     carbidopa-levodopa (SINEMET)  MG tablet TAKE 1 TABLET BY MOUTH TWICE DAILY IN THE MORNING & IN THE EVENING 56 tablet PRN     cephALEXin (KEFLEX) 500 MG capsule Take 1 capsule (500 mg) by mouth 3 times daily for 5 days 15 capsule 0     citalopram (CELEXA) 20 MG tablet TAKE 1 TABLET BY MOUTH ONCE DAILY 28 tablet PRN     cyanocobalamin (VITAMIN B-12) 500 MCG tablet TAKE 1 TABLET BY MOUTH ONCE DAILY 28 tablet PRN     Eucerin external lotion APPLY TOPICALLY TO BILATERAL LOWER EXTREMITIES AS DIRECTED TWICE DAILY 500 mL 97     mirtazapine (REMERON) 7.5 MG tablet TAKE 1 TABLET BY MOUTH AT BEDTIME 28 tablet PRN     Multiple Vitamins-Minerals (CEROVITE SENIOR) TABS TAKE 1 TABLET BY MOUTH ONCE DAILY 28 tablet 97     nitroGLYcerin (NITROSTAT) 0.4 MG sublingual tablet PLACE 1 TABLET UNDER TONGUE AT ONSET OF CHEST PAIN. MAY REPEAT EVERY 5 MINUTES AS NEEDED X 3 DOSES 25 tablet 97     polyethylene glycol (MIRALAX) 17 GM/Dose powder MIX 8.5GM OF POWDER IN 8 OZ OF WATER UNTIL COMPLETELY DISSOLVED. DRINK SOLUTION BY MOUTH ONCE DAILY AS NEEDED FOR CONSTIPATION 238 g 97     traZODone (DESYREL) 50 MG tablet TAKE ONE-HALF TABLET (25MG) BY MOUTH TWICE DAILY;AND ONCE DAILY AS NEEDED FOR ANXIETY 28 tablet PRN     triamcinolone (KENALOG) 0.1 % external ointment Apply topically 2 times daily X 3 days then return to BID PRN thereafter 80 g 97     VITAMIN D3 25 MCG (1000 UT) tablet TAKE TWO TABLETS (2000 UNITS) BY MOUTH ONCE DAILY  56 tablet 97       REVIEW OF SYSTEMS:  4 point ROS including Respiratory, CV, GI and , other than that noted in the HPI,  is  negative    Objective:  Temp 97  F (36.1  C)   Wt 50.3 kg (111 lb)   BMI 24.02 kg/m    Exam:  GENERAL APPEARANCE:  Alert, in no distress, thin  ENT:  [place], oropharynx clear  EYES:  EOM normal, conjunctiva and lids normal  NECK: no adenopathy,masses or thyromegaly  RESP:  lungs clear to auscultation , no respiratory distress  CV:  regular rate and rhythm, no murmur, no edema, +2 pedal pulses  ABDOMEN:  soft, non-tender, no distension, no masses  M/S:   wheelchair. MA with good strength. No joint inflammation  SKIN: multiple open areas of both shins L>R  with small to moderate amount of bloody drainage. All wounds are superficial with granulation. LLE erythematous and warm to touch. Stasis discoloration both LE  PSYCH:  oriented X 3, memory impaired , affect and mood normal    Labs:   Recent labs in [facility] reviewed by me today.     ASSESSMENT / PLAN:  (S81.930E) Multiple opens wound of lower extremity, unspecified laterality, initial encounter  (primary encounter diagnosis)  (I87.2) Peripheral vascular disease with stasis dermatitis  (L03.116) Cellulitis of left lower extremity  Comment: multiple wounds of both LE. Reportedly due to trauma, compounded by PVD. LLE appears to have ongoing cellulitis, no signs of infection of RLE.   Plan: continue cephalexin for an additional 5 days (10 days total). Daily wound care using adaptic, ABD and secure with kerlix. Refer to home care for wound management.     (G20) Parkinsonism, unspecified Parkinsonism type (H)  Comment: wheelchair for mobility due to LE tremors and freezing. Activity is limited by his fear of falling   Plan: continue sinemet.     (F33.1) Moderate episode of recurrent major depressive disorder (H)  (F41.1) Generalized anxiety disorder  Comment: pleasant and talkative. Appears in good spirits, sleeping well.   Plan: continue citalopram, mirtazapine, trazodone     (E44.0) Moderate protein malnutrition (H)  Comment: he won't leave his apt to be weighed.  Reports  good appetite   Plan: continue mirtazapine. Nutritional supplements.     (R46.84) Cognitive impairment  Comment: appears to have mild deficits. Conversation is appropriate. [facility] 25/30 while on tcu in 2019  Plan: AL staff assistance with meals, cares, med admin         Electronically signed by:  SYBIL Petersen CNP                 Documentation of Face-to-Face and Certification for Home Health Services     Patient: Ben Salvador   YOB: 1928  MR Number: [id number]  Today's Date: 12/29/2021    I certify that patient: Ben Salvador is under my care and that I, or a nurse practitioner or physician's assistant working with me, had a face-to-face encounter that meets the physician face-to-face encounter requirements with this patient on: 12/29/2021.    This encounter with the patient was in whole, or in part, for the following medical condition, which is the primary reason for home health care: open areas both LE, PVD.    I certify that, based on my findings, the following services are medically necessary home health services: Nursing.    My clinical findings support the need for the above services because: Nurse is needed: To assess VS, LE wounds, cellulitis after changes in medications or other medical regimen., To provide assessment and oversight required in the home to assure adherence to the medical plan due to: risk for hospitalization. and To provide caregiver training to assist with: wound managment..    Further, I certify that my clinical findings support that this patient is homebound (i.e. absences from home require considerable and taxing effort and are for medical reasons or Religion services or infrequently or of short duration when for other reasons) because: Requires assistance of another person or specialized equipment to access medical services because patient: Is unable to exit home safely on own due to: wheelchair bound. and Requires supervision of another for safe  transfer...    Based on the above findings. I certify that this patient is confined to the home and needs intermittent skilled nursing care, physical therapy and/or speech therapy.  The patient is under my care, and I have initiated the establishment of the plan of care.  This patient will be followed by a physician who will periodically review the plan of care.  Physician/Provider to provide follow up care: Mariano Estes    Responsible Medicare certified PECOS Physician: Dr.Gretchen Augustus MD, signing F2F and only signing for initial order. Please send all follow up questions and concerns or needed follow up signatures to the PCP, who [facility] has on file as:  Mariano Estes.  Physician Signature: See electronic signature associated with these discharge orders.  Date: 12/29/2021          Sincerely,        SYBIL Petersen CNP

## 2021-12-29 NOTE — PROGRESS NOTES
"[facility]  [place] Medical Record Number:  [id number]  Place of Service where encounter took place:  [facility] [[id number][facility]  Chief Complaint   Patient presents with     RECHECK       HPI:    Ben Salvador  is a 89 year old (1/14/1928), who is being seen today for an episodic care visit.  HPI information obtained from: facility chart records, facility staff, patient report and [facility] chart review.   He and his wife have lived at this facility since 1/2020. Medical history significant for Parkinsonism, depression, anxiety, CAD with MI and stent 2003,  CKD stage 3, BPH, HTN, recurrent falls, vitamin B12 deficiency, anemia, gout.       Today's concern is:     Multiple opens wound of lower extremity, unspecified laterality, initial encounter  Peripheral vascular disease with stasis dermatitis  Cellulitis of left lower extremity  Parkinsonism, unspecified Parkinsonism type (H)  Moderate episode of recurrent major depressive disorder (H)  Generalized anxiety disorder  Moderate protein malnutrition (H)  Cognitive impairment     He is seen today for evaluation of open wounds of both LE. These were first reported by staff on 12/24/2021 and cephalexin was started for cellulitis. Patient reports the wounds are from an aide \"scrubbing\" his legs too hard during a bath. Reports the LLE is painful with wound care, but improving. No significant pain of the RLE. He's had open LE wounds in the past that were slow to heal. He is otherwise feeling well. Denies fever or chills. Good appetite. Wheelchair bound, able to stand for transfers and toileting. He and his wife rarely leave their apt.       Past Medical and Surgical History reviewed in Epic today.    MEDICATIONS:    Current Outpatient Medications   Medication Sig Dispense Refill     acetaminophen (TYLENOL) 325 MG tablet TAKE TWO TABLETS (650 MG) BY MOUTH EVERY 6 HOURS AS NEEDED FOR PAIN 60 tablet 97     allopurinol (ZYLOPRIM) 100 MG tablet " TAKE 1 TABLET BY MOUTH ONCE DAILY 28 tablet 97     ASPIRIN LOW DOSE 81 MG chewable tablet CHEW AND SWALLOW ONE TABLET BY MOUTH ONCE DAILY 28 tablet 97     atorvastatin (LIPITOR) 40 MG tablet TAKE 1 TABLET BY MOUTH ONCE DAILY 28 tablet 97     carbidopa-levodopa (SINEMET)  MG tablet TAKE 1 TABLET BY MOUTH TWICE DAILY IN THE MORNING & IN THE EVENING 56 tablet PRN     cephALEXin (KEFLEX) 500 MG capsule Take 1 capsule (500 mg) by mouth 3 times daily for 5 days 15 capsule 0     citalopram (CELEXA) 20 MG tablet TAKE 1 TABLET BY MOUTH ONCE DAILY 28 tablet PRN     cyanocobalamin (VITAMIN B-12) 500 MCG tablet TAKE 1 TABLET BY MOUTH ONCE DAILY 28 tablet PRN     Eucerin external lotion APPLY TOPICALLY TO BILATERAL LOWER EXTREMITIES AS DIRECTED TWICE DAILY 500 mL 97     mirtazapine (REMERON) 7.5 MG tablet TAKE 1 TABLET BY MOUTH AT BEDTIME 28 tablet PRN     Multiple Vitamins-Minerals (CEROVITE SENIOR) TABS TAKE 1 TABLET BY MOUTH ONCE DAILY 28 tablet 97     nitroGLYcerin (NITROSTAT) 0.4 MG sublingual tablet PLACE 1 TABLET UNDER TONGUE AT ONSET OF CHEST PAIN. MAY REPEAT EVERY 5 MINUTES AS NEEDED X 3 DOSES 25 tablet 97     polyethylene glycol (MIRALAX) 17 GM/Dose powder MIX 8.5GM OF POWDER IN 8 OZ OF WATER UNTIL COMPLETELY DISSOLVED. DRINK SOLUTION BY MOUTH ONCE DAILY AS NEEDED FOR CONSTIPATION 238 g 97     traZODone (DESYREL) 50 MG tablet TAKE ONE-HALF TABLET (25MG) BY MOUTH TWICE DAILY;AND ONCE DAILY AS NEEDED FOR ANXIETY 28 tablet PRN     triamcinolone (KENALOG) 0.1 % external ointment Apply topically 2 times daily X 3 days then return to BID PRN thereafter 80 g 97     VITAMIN D3 25 MCG (1000 UT) tablet TAKE TWO TABLETS (2000 UNITS) BY MOUTH ONCE DAILY  56 tablet 97       REVIEW OF SYSTEMS:  4 point ROS including Respiratory, CV, GI and , other than that noted in the HPI,  is negative    Objective:  Temp 97  F (36.1  C)   Wt 50.3 kg (111 lb)   BMI 24.02 kg/m    Exam:  GENERAL APPEARANCE:  Alert, in no distress,  thin  ENT:  [place], oropharynx clear  EYES:  EOM normal, conjunctiva and lids normal  NECK: no adenopathy,masses or thyromegaly  RESP:  lungs clear to auscultation , no respiratory distress  CV:  regular rate and rhythm, no murmur, no edema, +2 pedal pulses  ABDOMEN:  soft, non-tender, no distension, no masses  M/S:   wheelchair. MA with good strength. No joint inflammation  SKIN: multiple open areas of both shins L>R  with small to moderate amount of bloody drainage. All wounds are superficial with granulation. LLE erythematous and warm to touch. Stasis discoloration both LE  PSYCH:  oriented X 3, memory impaired , affect and mood normal    Labs:   Recent labs in [facility] reviewed by me today.     ASSESSMENT / PLAN:  (S84.515P) Multiple opens wound of lower extremity, unspecified laterality, initial encounter  (primary encounter diagnosis)  (I87.2) Peripheral vascular disease with stasis dermatitis  (L03.116) Cellulitis of left lower extremity  Comment: multiple wounds of both LE. Reportedly due to trauma, compounded by PVD. LLE appears to have ongoing cellulitis, no signs of infection of RLE.   Plan: continue cephalexin for an additional 5 days (10 days total). Daily wound care using adaptic, ABD and secure with kerlix. Refer to home care for wound management.     (G20) Parkinsonism, unspecified Parkinsonism type (H)  Comment: wheelchair for mobility due to LE tremors and freezing. Activity is limited by his fear of falling   Plan: continue sinemet.     (F33.1) Moderate episode of recurrent major depressive disorder (H)  (F41.1) Generalized anxiety disorder  Comment: pleasant and talkative. Appears in good spirits, sleeping well.   Plan: continue citalopram, mirtazapine, trazodone     (E44.0) Moderate protein malnutrition (H)  Comment: he won't leave his apt to be weighed.  Reports good appetite   Plan: continue mirtazapine. Nutritional supplements.     (R41.89) Cognitive impairment  Comment: appears to have mild  deficits. Conversation is appropriate. [facility] 25/30 while on tcu in 2019  Plan: AL staff assistance with meals, cares, med admin         Electronically signed by:  SYBIL Petersen CNP

## 2022-01-01 ENCOUNTER — ASSISTED LIVING VISIT (OUTPATIENT)
Dept: GERIATRICS | Facility: CLINIC | Age: 87
End: 2022-01-01
Payer: MEDICARE

## 2022-01-01 ENCOUNTER — LAB REQUISITION (OUTPATIENT)
Dept: LAB | Facility: CLINIC | Age: 87
End: 2022-01-01
Payer: MEDICARE

## 2022-01-01 VITALS
DIASTOLIC BLOOD PRESSURE: 66 MMHG | BODY MASS INDEX: 21.6 KG/M2 | SYSTOLIC BLOOD PRESSURE: 126 MMHG | HEART RATE: 67 BPM | TEMPERATURE: 97.4 F | RESPIRATION RATE: 18 BRPM | WEIGHT: 110 LBS | HEIGHT: 60 IN | OXYGEN SATURATION: 96 %

## 2022-01-01 VITALS
WEIGHT: 111 LBS | HEART RATE: 67 BPM | DIASTOLIC BLOOD PRESSURE: 66 MMHG | BODY MASS INDEX: 21.79 KG/M2 | TEMPERATURE: 97.4 F | RESPIRATION RATE: 18 BRPM | OXYGEN SATURATION: 96 % | HEIGHT: 60 IN | SYSTOLIC BLOOD PRESSURE: 126 MMHG

## 2022-01-01 VITALS
RESPIRATION RATE: 20 BRPM | DIASTOLIC BLOOD PRESSURE: 73 MMHG | HEART RATE: 88 BPM | HEIGHT: 67 IN | SYSTOLIC BLOOD PRESSURE: 104 MMHG | TEMPERATURE: 97.7 F | OXYGEN SATURATION: 96 % | WEIGHT: 110 LBS | BODY MASS INDEX: 17.27 KG/M2

## 2022-01-01 DIAGNOSIS — Z81.0 FAMILY HISTORY OF INTELLECTUAL DISABILITIES: ICD-10-CM

## 2022-01-01 DIAGNOSIS — R52 GENERALIZED PAIN: ICD-10-CM

## 2022-01-01 DIAGNOSIS — S81.801D WOUND OF RIGHT LOWER EXTREMITY, SUBSEQUENT ENCOUNTER: Primary | ICD-10-CM

## 2022-01-01 DIAGNOSIS — Z53.9 ERRONEOUS ENCOUNTER--DISREGARD: Primary | ICD-10-CM

## 2022-01-01 LAB — VIT B12 SERPL-MCNC: 1121 PG/ML (ref 232–1245)

## 2022-01-01 PROCEDURE — 36415 COLL VENOUS BLD VENIPUNCTURE: CPT | Mod: ORL | Performed by: NURSE PRACTITIONER

## 2022-01-01 PROCEDURE — 82607 VITAMIN B-12: CPT | Mod: ORL | Performed by: NURSE PRACTITIONER

## 2022-01-01 PROCEDURE — P9603 ONE-WAY ALLOW PRORATED MILES: HCPCS | Mod: ORL | Performed by: NURSE PRACTITIONER

## 2022-01-01 RX ORDER — ACETAMINOPHEN 325 MG/1
TABLET ORAL
Qty: 60 TABLET | Refills: 97 | Status: SHIPPED | OUTPATIENT
Start: 2022-01-01 | End: 2023-01-01 | Stop reason: DRUGHIGH

## 2022-01-05 ENCOUNTER — ASSISTED LIVING VISIT (OUTPATIENT)
Dept: GERIATRICS | Facility: CLINIC | Age: 87
End: 2022-01-05
Payer: MEDICARE

## 2022-01-05 ENCOUNTER — TELEPHONE (OUTPATIENT)
Dept: GERIATRICS | Facility: CLINIC | Age: 87
End: 2022-01-05

## 2022-01-05 VITALS — WEIGHT: 111 LBS | TEMPERATURE: 97.7 F | BODY MASS INDEX: 24.02 KG/M2

## 2022-01-05 DIAGNOSIS — G20.C PARKINSONISM, UNSPECIFIED PARKINSONISM TYPE (H): ICD-10-CM

## 2022-01-05 DIAGNOSIS — L03.116 CELLULITIS OF LEFT LOWER EXTREMITY: ICD-10-CM

## 2022-01-05 DIAGNOSIS — I87.2 PERIPHERAL VASCULAR DISEASE WITH STASIS DERMATITIS: ICD-10-CM

## 2022-01-05 DIAGNOSIS — S81.809A MULTIPLE OPENS WOUND OF LOWER EXTREMITY, UNSPECIFIED LATERALITY, INITIAL ENCOUNTER: Primary | ICD-10-CM

## 2022-01-05 DIAGNOSIS — L30.9 DERMATITIS: Primary | ICD-10-CM

## 2022-01-05 RX ORDER — LANOLIN ALCOHOL/MO/W.PET/CERES
CREAM (GRAM) TOPICAL
Qty: 454 G | Refills: 97 | Status: SHIPPED | OUTPATIENT
Start: 2022-01-05

## 2022-01-05 NOTE — LETTER
1/5/2022        RE: Ben Salvador  C/o Torin Salvador  [address] [facility] [address]        [facility]  [place] Medical Record Number:  [id number]  Place of Service where encounter took place:  [facility] [[id number]]  Chief Complaint   Patient presents with     RECHECK       HPI:    Ben Slavador  is a 89 year old (1/14/1928), who is being seen today for an episodic care visit.  HPI information obtained from: facility chart records, facility staff, patient report and [facility] chart review.   He and his wife have lived at this facility since 1/2020. Medical history significant for Parkinsonism, depression, anxiety, CAD with MI and stent 2003,  CKD stage 3, BPH, HTN, recurrent falls, vitamin B12 deficiency, anemia, gout.       Today's concern is:     Multiple opens wound of lower extremity, unspecified laterality, initial encounter  Peripheral vascular disease with stasis dermatitis  Cellulitis of left lower extremity  Parkinsonism, unspecified Parkinsonism type (H)   He is seen today to follow up on open areas of both LE, first reported by staff on 12/24/2021. Cephalexin was started that day for cellulitis. He reports less pain of his legs. Feeling well. Good appetite. Wheelchair bond, able to stand for transfers and cares.     Past Medical and Surgical History reviewed in Epic today.    MEDICATIONS:    Current Outpatient Medications   Medication Sig Dispense Refill     acetaminophen (TYLENOL) 325 MG tablet TAKE TWO TABLETS (650 MG) BY MOUTH EVERY 6 HOURS AS NEEDED FOR PAIN 60 tablet 97     allopurinol (ZYLOPRIM) 100 MG tablet TAKE 1 TABLET BY MOUTH ONCE DAILY 28 tablet 97     ASPIRIN LOW DOSE 81 MG chewable tablet CHEW AND SWALLOW ONE TABLET BY MOUTH ONCE DAILY 28 tablet 97     atorvastatin (LIPITOR) 40 MG tablet TAKE 1 TABLET BY MOUTH ONCE DAILY 28 tablet 97     carbidopa-levodopa (SINEMET)  MG tablet TAKE 1 TABLET BY MOUTH TWICE DAILY IN THE MORNING & IN  THE EVENING 56 tablet PRN     citalopram (CELEXA) 20 MG tablet TAKE 1 TABLET BY MOUTH ONCE DAILY 28 tablet PRN     cyanocobalamin (VITAMIN B-12) 500 MCG tablet TAKE 1 TABLET BY MOUTH ONCE DAILY 28 tablet PRN     Eucerin external lotion APPLY TOPICALLY TO BILATERAL LOWER EXTREMITIES AS DIRECTED TWICE DAILY 500 mL 97     mineral oil-white petrolatum (EUCERIN) CREA cream APPLY TOPICALLY TO BILATERAL LOWER EXTEMITIES AT AT BEDTIME OR AS NEEDED 454 g 97     mirtazapine (REMERON) 7.5 MG tablet TAKE 1 TABLET BY MOUTH AT BEDTIME 28 tablet PRN     Multiple Vitamins-Minerals (CEROVITE SENIOR) TABS TAKE 1 TABLET BY MOUTH ONCE DAILY 28 tablet 97     nitroGLYcerin (NITROSTAT) 0.4 MG sublingual tablet PLACE 1 TABLET UNDER TONGUE AT ONSET OF CHEST PAIN. MAY REPEAT EVERY 5 MINUTES AS NEEDED X 3 DOSES 25 tablet 97     triamcinolone (KENALOG) 0.1 % external ointment Apply topically 2 times daily X 3 days then return to BID PRN thereafter 80 g 97     VITAMIN D3 25 MCG (1000 UT) tablet TAKE TWO TABLETS (2000 UNITS) BY MOUTH ONCE DAILY ***NOTE DOSAGE/STRENGTH*** 56 tablet 97       REVIEW OF SYSTEMS:  4 point ROS including Respiratory, CV, GI and , other than that noted in the HPI,  is negative    Objective:  Temp 97.7  F (36.5  C)   Wt 50.3 kg (111 lb)   BMI 24.02 kg/m    Exam:  GENERAL APPEARANCE:  Alert, in no distress, thin  ENT:  [place], oropharynx clear  EYES:  EOM normal, conjunctiva and lids normal  NECK: no adenopathy,masses or thyromegaly  RESP:  lungs clear to auscultation , no respiratory distress  CV:  regular rate and rhythm, no murmur, no edema, +2 pedal pulses  ABDOMEN:  soft, non-tender, no distension, no masses  M/S:   wheelchair. MA with good strength. No joint inflammation  SKIN: stasis discoloration both LE, no bright erythema. Superficial open areas both lower legs L>R, all are superficial and clean, minimal bloody drainage.   PSYCH:  oriented X 3, memory impaired , affect and mood normal    Labs:   Recent labs in  [facility] reviewed by me today.     ASSESSMENT / PLAN:  (H11.958L) Multiple opens wound of lower extremity, unspecified laterality, initial encounter  (primary encounter diagnosis)   (I87.2) Peripheral vascular disease with stasis dermatitis  Comment: all wounds are superficial and healing well. RLE wounds almost healed.   Plan: continue wound care using adaptic, ABD and secure with kerlix. [facility] home care is involved.     (L03.116) Cellulitis of left lower extremity  Comment: completed cephalexin 1/3/2022 and infection appears resolved   Plan: closely monitor for recurrence     (G20) Parkinsonism, unspecified Parkinsonism type (H)  Comment: wheelchair for mobility due to LE weakness, tremors and freezing.   Plan: continue sinemet. AL staff assistance with cares, meals and med admin.       Electronically signed by:  SYBIL Petersen CNP                 Sincerely,        SYBIL Petersen CNP

## 2022-01-05 NOTE — TELEPHONE ENCOUNTER
Triage Home Care/Hospice Order Request    Provider: SYBIL Gomez CNP   Facility: [facility]  Facility Type:  AL    Agency: [facility] Type: HC - SN  Caller: Leslie  Call Back Number: [phone]      Order Request: Wound care to BLE visits 1w1, 3w3, 2w5    Verbal orders approved and given to Leslie - [place]    Provider giving order: SYIBL Gomez CNP, RN

## 2022-01-05 NOTE — PROGRESS NOTES
[place] [facility]  [place] Medical Record Number:  [id number]  Place of Service where encounter took place:  [facility] ([facility]) [[id number]]  Chief Complaint   Patient presents with     RECHECK       HPI:    Ben Salvador  is a 89 year old (1/14/1928), who is being seen today for an episodic care visit.  HPI information obtained from: facility chart records, facility staff, patient report and [facility] chart review.   He and his wife have lived at this facility since 1/2020. Medical history significant for Parkinsonism, depression, anxiety, CAD with MI and stent 2003,  CKD stage 3, BPH, HTN, recurrent falls, vitamin B12 deficiency, anemia, gout.       Today's concern is:     Multiple opens wound of lower extremity, unspecified laterality, initial encounter  Peripheral vascular disease with stasis dermatitis  Cellulitis of left lower extremity  Parkinsonism, unspecified Parkinsonism type (H)   He is seen today to follow up on open areas of both LE, first reported by staff on 12/24/2021. Cephalexin was started that day for cellulitis. He reports less pain of his legs. Feeling well. Good appetite. Wheelchair bond, able to stand for transfers and cares.     Past Medical and Surgical History reviewed in Epic today.    MEDICATIONS:    Current Outpatient Medications   Medication Sig Dispense Refill     acetaminophen (TYLENOL) 325 MG tablet TAKE TWO TABLETS (650 MG) BY MOUTH EVERY 6 HOURS AS NEEDED FOR PAIN 60 tablet 97     allopurinol (ZYLOPRIM) 100 MG tablet TAKE 1 TABLET BY MOUTH ONCE DAILY 28 tablet 97     ASPIRIN LOW DOSE 81 MG chewable tablet CHEW AND SWALLOW ONE TABLET BY MOUTH ONCE DAILY 28 tablet 97     atorvastatin (LIPITOR) 40 MG tablet TAKE 1 TABLET BY MOUTH ONCE DAILY 28 tablet 97     carbidopa-levodopa (SINEMET)  MG tablet TAKE 1 TABLET BY MOUTH TWICE DAILY IN THE MORNING & IN THE EVENING 56 tablet PRN     citalopram (CELEXA) 20 MG tablet TAKE 1 TABLET BY MOUTH ONCE DAILY 28 tablet  PRN     cyanocobalamin (VITAMIN B-12) 500 MCG tablet TAKE 1 TABLET BY MOUTH ONCE DAILY 28 tablet PRN     Eucerin external lotion APPLY TOPICALLY TO BILATERAL LOWER EXTREMITIES AS DIRECTED TWICE DAILY 500 mL 97     mineral oil-white petrolatum (EUCERIN) CREA cream APPLY TOPICALLY TO BILATERAL LOWER EXTEMITIES AT AT BEDTIME OR AS NEEDED 454 g 97     mirtazapine (REMERON) 7.5 MG tablet TAKE 1 TABLET BY MOUTH AT BEDTIME 28 tablet PRN     Multiple Vitamins-Minerals (CEROVITE SENIOR) TABS TAKE 1 TABLET BY MOUTH ONCE DAILY 28 tablet 97     nitroGLYcerin (NITROSTAT) 0.4 MG sublingual tablet PLACE 1 TABLET UNDER TONGUE AT ONSET OF CHEST PAIN. MAY REPEAT EVERY 5 MINUTES AS NEEDED X 3 DOSES 25 tablet 97     triamcinolone (KENALOG) 0.1 % external ointment Apply topically 2 times daily X 3 days then return to BID PRN thereafter 80 g 97     VITAMIN D3 25 MCG (1000 UT) tablet TAKE TWO TABLETS (2000 UNITS) BY MOUTH ONCE DAILY 56 tablet 97       REVIEW OF SYSTEMS:  4 point ROS including Respiratory, CV, GI and , other than that noted in the HPI,  is negative    Objective:  Temp 97.7  F (36.5  C)   Wt 50.3 kg (111 lb)   BMI 24.02 kg/m    Exam:  GENERAL APPEARANCE:  Alert, in no distress, thin  ENT:  [place], oropharynx clear  EYES:  EOM normal, conjunctiva and lids normal  NECK: no adenopathy,masses or thyromegaly  RESP:  lungs clear to auscultation , no respiratory distress  CV:  regular rate and rhythm, no murmur, no edema, +2 pedal pulses  ABDOMEN:  soft, non-tender, no distension, no masses  M/S:   wheelchair. MA with good strength. No joint inflammation  SKIN: stasis discoloration both LE, no bright erythema. Superficial open areas both lower legs L>R, all are superficial and clean, minimal bloody drainage.   PSYCH:  oriented X 3, memory impaired , affect and mood normal    Labs:   Recent labs in [facility] reviewed by me today.     ASSESSMENT / PLAN:  (B83.684E) Multiple opens wound of lower extremity, unspecified laterality,  initial encounter  (primary encounter diagnosis)   (I87.2) Peripheral vascular disease with stasis dermatitis  Comment: all wounds are superficial and healing well. RLE wounds almost healed.   Plan: continue wound care using adaptic, ABD and secure with kerlix. [facility] home care is involved.     (L03.116) Cellulitis of left lower extremity  Comment: completed cephalexin 1/3/2022 and infection appears resolved   Plan: closely monitor for recurrence     (G20) Parkinsonism, unspecified Parkinsonism type (H)  Comment: wheelchair for mobility due to LE weakness, tremors and freezing.   Plan: continue sinemet. AL staff assistance with cares, meals and med admin.       Electronically signed by:  SYBIL Petersen CNP

## 2022-01-05 NOTE — LETTER
1/5/2022        RE: Ben Salvador  C/o Torin Salvador  [address] [facility] [address]        [place] [facility]  [place] [facility] Record Number:  [id number]  Place of Service where encounter took place:  [facility] ([facility]) [[id number]]  Chief Complaint   Patient presents with     RECHECK       HPI:    Ben Salvador  is a 89 year old (1/14/1928), who is being seen today for an episodic care visit.  HPI information obtained from: facility chart records, facility staff, patient report and [facility] chart review.   He and his wife have lived at this facility since 1/2020. Medical history significant for Parkinsonism, depression, anxiety, CAD with MI and stent 2003,  CKD stage 3, BPH, HTN, recurrent falls, vitamin B12 deficiency, anemia, gout.       Today's concern is:     Multiple opens wound of lower extremity, unspecified laterality, initial encounter  Peripheral vascular disease with stasis dermatitis  Cellulitis of left lower extremity  Parkinsonism, unspecified Parkinsonism type (H)   He is seen today to follow up on open areas of both LE, first reported by staff on 12/24/2021. Cephalexin was started that day for cellulitis. He reports less pain of his legs. Feeling well. Good appetite. Wheelchair bond, able to stand for transfers and cares.     Past Medical and Surgical History reviewed in Epic today.    MEDICATIONS:    Current Outpatient Medications   Medication Sig Dispense Refill     acetaminophen (TYLENOL) 325 MG tablet TAKE TWO TABLETS (650 MG) BY MOUTH EVERY 6 HOURS AS NEEDED FOR PAIN 60 tablet 97     allopurinol (ZYLOPRIM) 100 MG tablet TAKE 1 TABLET BY MOUTH ONCE DAILY 28 tablet 97     ASPIRIN LOW DOSE 81 MG chewable tablet CHEW AND SWALLOW ONE TABLET BY MOUTH ONCE DAILY 28 tablet 97     atorvastatin (LIPITOR) 40 MG tablet TAKE 1 TABLET BY MOUTH ONCE DAILY 28 tablet 97     carbidopa-levodopa (SINEMET)  MG tablet TAKE 1 TABLET BY MOUTH TWICE DAILY IN THE MORNING & IN  THE EVENING 56 tablet PRN     citalopram (CELEXA) 20 MG tablet TAKE 1 TABLET BY MOUTH ONCE DAILY 28 tablet PRN     cyanocobalamin (VITAMIN B-12) 500 MCG tablet TAKE 1 TABLET BY MOUTH ONCE DAILY 28 tablet PRN     Eucerin external lotion APPLY TOPICALLY TO BILATERAL LOWER EXTREMITIES AS DIRECTED TWICE DAILY 500 mL 97     mineral oil-white petrolatum (EUCERIN) CREA cream APPLY TOPICALLY TO BILATERAL LOWER EXTEMITIES AT AT BEDTIME OR AS NEEDED 454 g 97     mirtazapine (REMERON) 7.5 MG tablet TAKE 1 TABLET BY MOUTH AT BEDTIME 28 tablet PRN     Multiple Vitamins-Minerals (CEROVITE SENIOR) TABS TAKE 1 TABLET BY MOUTH ONCE DAILY 28 tablet 97     nitroGLYcerin (NITROSTAT) 0.4 MG sublingual tablet PLACE 1 TABLET UNDER TONGUE AT ONSET OF CHEST PAIN. MAY REPEAT EVERY 5 MINUTES AS NEEDED X 3 DOSES 25 tablet 97     triamcinolone (KENALOG) 0.1 % external ointment Apply topically 2 times daily X 3 days then return to BID PRN thereafter 80 g 97     VITAMIN D3 25 MCG (1000 UT) tablet TAKE TWO TABLETS (2000 UNITS) BY MOUTH ONCE DAILY 56 tablet 97       REVIEW OF SYSTEMS:  4 point ROS including Respiratory, CV, GI and , other than that noted in the HPI,  is negative    Objective:  Temp 97.7  F (36.5  C)   Wt 50.3 kg (111 lb)   BMI 24.02 kg/m    Exam:  GENERAL APPEARANCE:  Alert, in no distress, thin  ENT:  [place], oropharynx clear  EYES:  EOM normal, conjunctiva and lids normal  NECK: no adenopathy,masses or thyromegaly  RESP:  lungs clear to auscultation , no respiratory distress  CV:  regular rate and rhythm, no murmur, no edema, +2 pedal pulses  ABDOMEN:  soft, non-tender, no distension, no masses  M/S:   wheelchair. MA with good strength. No joint inflammation  SKIN: stasis discoloration both LE, no bright erythema. Superficial open areas both lower legs L>R, all are superficial and clean, minimal bloody drainage.   PSYCH:  oriented X 3, memory impaired , affect and mood normal    Labs:   Recent labs in [facility] reviewed by me today.      ASSESSMENT / PLAN:  (F41.822U) Multiple opens wound of lower extremity, unspecified laterality, initial encounter  (primary encounter diagnosis)   (I87.2) Peripheral vascular disease with stasis dermatitis  Comment: all wounds are superficial and healing well. RLE wounds almost healed.   Plan: continue wound care using adaptic, ABD and secure with kerlix. [facility] home care is involved.     (L03.116) Cellulitis of left lower extremity  Comment: completed cephalexin 1/3/2022 and infection appears resolved   Plan: closely monitor for recurrence     (G20) Parkinsonism, unspecified Parkinsonism type (H)  Comment: wheelchair for mobility due to LE weakness, tremors and freezing.   Plan: continue sinemet. AL staff assistance with cares, meals and med admin.       Electronically signed by:  SYBIL Petersen CNP                 Sincerely,        SYBIL Petersen CNP

## 2022-01-06 PROCEDURE — U0005 INFEC AGEN DETEC AMPLI PROBE: HCPCS | Mod: ORL | Performed by: INTERNAL MEDICINE

## 2022-01-07 ENCOUNTER — LAB REQUISITION (OUTPATIENT)
Dept: LAB | Facility: CLINIC | Age: 87
End: 2022-01-07
Payer: MEDICARE

## 2022-01-07 DIAGNOSIS — U07.1 COVID-19: ICD-10-CM

## 2022-01-07 LAB — SARS-COV-2 RNA RESP QL NAA+PROBE: NEGATIVE

## 2022-01-11 VITALS — BODY MASS INDEX: 24.02 KG/M2 | TEMPERATURE: 97 F | WEIGHT: 111 LBS

## 2022-01-12 ENCOUNTER — ASSISTED LIVING VISIT (OUTPATIENT)
Dept: GERIATRICS | Facility: CLINIC | Age: 87
End: 2022-01-12
Payer: MEDICARE

## 2022-01-12 ENCOUNTER — LAB REQUISITION (OUTPATIENT)
Dept: LAB | Facility: CLINIC | Age: 87
End: 2022-01-12
Payer: MEDICARE

## 2022-01-12 DIAGNOSIS — L03.116 CELLULITIS OF LEFT LOWER EXTREMITY: ICD-10-CM

## 2022-01-12 DIAGNOSIS — I87.2 PERIPHERAL VASCULAR DISEASE WITH STASIS DERMATITIS: ICD-10-CM

## 2022-01-12 DIAGNOSIS — G20.C PARKINSONISM, UNSPECIFIED PARKINSONISM TYPE (H): ICD-10-CM

## 2022-01-12 DIAGNOSIS — S81.809D MULTIPLE OPENS WOUND OF LOWER EXTREMITY, UNSPECIFIED LATERALITY, SUBSEQUENT ENCOUNTER: Primary | ICD-10-CM

## 2022-01-12 DIAGNOSIS — L30.9 DERMATITIS: Primary | ICD-10-CM

## 2022-01-12 DIAGNOSIS — U07.1 COVID-19: ICD-10-CM

## 2022-01-12 RX ORDER — TRIAMCINOLONE ACETONIDE 1 MG/G
OINTMENT TOPICAL
Qty: 80 G | Refills: 97 | Status: SHIPPED | OUTPATIENT
Start: 2022-01-12

## 2022-01-12 NOTE — PROGRESS NOTES
[place] [facility]  [place] [facility] Record Number:  [id number]  Place of Service where encounter took place:  [facility] ([facility]) [[id number]]  Chief Complaint   Patient presents with     RECHECK       HPI:    Ben Salvador  is a 89 year old (1/14/1928), who is being seen today for an episodic care visit.  HPI information obtained from: facility chart records, facility staff, patient report and [facility] chart review.   He and his wife have lived at this facility since 1/2020. Medical history significant for Parkinsonism, depression, anxiety, CAD with MI and stent 2003,  CKD stage 3, BPH, HTN, recurrent falls, vitamin B12 deficiency, anemia, gout.     Today's concern is:     Multiple opens wound of lower extremity, unspecified laterality, subsequent encounter  Peripheral vascular disease with stasis dermatitis  Cellulitis of left lower extremity  Parkinsonism, unspecified Parkinsonism type (H)   He is seen today to follow up on open wounds of both LE, first reported by staff on 12/24/2021. Cephalexin was started that day for cellulitis of the LLE. Home care is involved for dressing changes. He reports feeling well with less pain of his legs. Denies fever or chills. Good appetite. Wheelchair bound. Independent with transfers, toileting and dressing.     Past Medical and Surgical History reviewed in Epic today.    MEDICATIONS:    Current Outpatient Medications   Medication Sig Dispense Refill     acetaminophen (TYLENOL) 325 MG tablet TAKE TWO TABLETS (650 MG) BY MOUTH EVERY 6 HOURS AS NEEDED FOR PAIN 60 tablet 97     allopurinol (ZYLOPRIM) 100 MG tablet TAKE 1 TABLET BY MOUTH ONCE DAILY 28 tablet 97     ASPIRIN LOW DOSE 81 MG chewable tablet CHEW AND SWALLOW ONE TABLET BY MOUTH ONCE DAILY 28 tablet 97     atorvastatin (LIPITOR) 40 MG tablet TAKE 1 TABLET BY MOUTH ONCE DAILY 28 tablet 97     carbidopa-levodopa (SINEMET)  MG tablet TAKE 1 TABLET BY MOUTH TWICE DAILY IN THE MORNING & IN THE  EVENING 56 tablet PRN     citalopram (CELEXA) 20 MG tablet TAKE 1 TABLET BY MOUTH ONCE DAILY 28 tablet PRN     cyanocobalamin (VITAMIN B-12) 500 MCG tablet TAKE 1 TABLET BY MOUTH ONCE DAILY 28 tablet PRN     Eucerin external lotion APPLY TOPICALLY TO BILATERAL LOWER EXTREMITIES AS DIRECTED TWICE DAILY 500 mL 97     mineral oil-white petrolatum (EUCERIN) CREA cream APPLY TOPICALLY TO BILATERAL LOWER EXTREMITIES AT BEDTIME OR AS NEEDED 454 g 97     mirtazapine (REMERON) 7.5 MG tablet TAKE 1 TABLET BY MOUTH AT BEDTIME 28 tablet PRN     Multiple Vitamins-Minerals (CEROVITE SENIOR) TABS TAKE 1 TABLET BY MOUTH ONCE DAILY 28 tablet 97     nitroGLYcerin (NITROSTAT) 0.4 MG sublingual tablet PLACE 1 TABLET UNDER TONGUE AT ONSET OF CHEST PAIN. MAY REPEAT EVERY 5 MINUTES AS NEEDED X 3 DOSES 25 tablet 97     triamcinolone (KENALOG) 0.1 % external ointment APPLY TOPICALLY TO LOWER EXTREMITY RASH TWICE DAILY AS NEEDED 80 g 97     VITAMIN D3 25 MCG (1000 UT) tablet TAKE TWO TABLETS (2000 UNITS) BY MOUTH ONCE DAILY  56 tablet 97         REVIEW OF SYSTEMS:  4 point ROS including Respiratory, CV, GI and , other than that noted in the HPI,  is negative    Objective:  Temp 97  F (36.1  C)   Wt 50.3 kg (111 lb)   BMI 24.02 kg/m    Exam:  GENERAL APPEARANCE:  Alert, in no distress, thin  ENT:  [place], oropharynx clear  EYES:  EOM normal, conjunctiva and lids normal  NECK: no adenopathy,masses or thyromegaly  RESP:  lungs clear to auscultation , no respiratory distress  CV:  regular rate and rhythm, no murmur, no edema, +2 pedal pulses  ABDOMEN:  soft, non-tender, no distension, no masses  M/S:   wheelchair. MA with good strength. No joint inflammation  SKIN: chronic stasis discoloration both LE, no erythema. Small superficial open area right posterior calf, clean and dry. Very  small superficial open area left shin clean, dry. No new open areas  PSYCH:  oriented X 3, memory impaired , affect and mood normal    Labs:   Recent labs in  [facility] reviewed by me today.     ASSESSMENT / PLAN:  (S81.809D) Multiple opens wound of lower extremity, unspecified laterality, subsequent encounter  (primary encounter diagnosis)  (I87.2) Peripheral vascular disease with stasis dermatitis  Comment: wounds of both LE are almost healed   Plan: continue wound care with adaptic,dry gauze and kerlix to secure. Tylenol prn. Wound care per [facility].     (L03.116) Cellulitis of left lower extremity  Comment: completed a course of cephalexin 1/3/2022. Infection resolved   Plan: monitor     (G20) Parkinsonism, unspecified Parkinsonism type (H)  Comment: chronic bilateral LE weakness, tremors and freezing. No tremor noted today   Plan: continue sinemet. AL staff assistance with cares, meals and med admin. He and his wife isolate in their apt and don't socialize.       Electronically signed by:  SYBIL Petersen CNP

## 2022-01-12 NOTE — LETTER
1/12/2022        RE: Ben Salvador  C/o Torin Salvador  [address] [address]        [place] [facility]  [place] [facility] Record Number:  [id number]  Place of Service where encounter took place:  [facility] ([facility]) [[id number]]  Chief Complaint   Patient presents with     RECHECK       HPI:    Ben Salvador  is a 89 year old (1/14/1928), who is being seen today for an episodic care visit.  HPI information obtained from: facility chart records, facility staff, patient report and [facility] chart review.   He and his wife have lived at this facility since 1/2020. Medical history significant for Parkinsonism, depression, anxiety, CAD with MI and stent 2003,  CKD stage 3, BPH, HTN, recurrent falls, vitamin B12 deficiency, anemia, gout.     Today's concern is:     Multiple opens wound of lower extremity, unspecified laterality, subsequent encounter  Peripheral vascular disease with stasis dermatitis  Cellulitis of left lower extremity  Parkinsonism, unspecified Parkinsonism type (H)   He is seen today to follow up on open wounds of both LE, first reported by staff on 12/24/2021. Cephalexin was started that day for cellulitis of the LLE. Home care is involved for dressing changes. He reports feeling well with less pain of his legs. Denies fever or chills. Good appetite. Wheelchair bound. Independent with transfers, toileting and dressing.     Past Medical and Surgical History reviewed in Epic today.    MEDICATIONS:    Current Outpatient Medications   Medication Sig Dispense Refill     acetaminophen (TYLENOL) 325 MG tablet TAKE TWO TABLETS (650 MG) BY MOUTH EVERY 6 HOURS AS NEEDED FOR PAIN 60 tablet 97     allopurinol (ZYLOPRIM) 100 MG tablet TAKE 1 TABLET BY MOUTH ONCE DAILY 28 tablet 97     ASPIRIN LOW DOSE 81 MG chewable tablet CHEW AND SWALLOW ONE TABLET BY MOUTH ONCE DAILY 28 tablet 97     atorvastatin (LIPITOR) 40 MG tablet TAKE 1 TABLET BY MOUTH ONCE DAILY 28 tablet 97      carbidopa-levodopa (SINEMET)  MG tablet TAKE 1 TABLET BY MOUTH TWICE DAILY IN THE MORNING & IN THE EVENING 56 tablet PRN     citalopram (CELEXA) 20 MG tablet TAKE 1 TABLET BY MOUTH ONCE DAILY 28 tablet PRN     cyanocobalamin (VITAMIN B-12) 500 MCG tablet TAKE 1 TABLET BY MOUTH ONCE DAILY 28 tablet PRN     Eucerin external lotion APPLY TOPICALLY TO BILATERAL LOWER EXTREMITIES AS DIRECTED TWICE DAILY 500 mL 97     mineral oil-white petrolatum (EUCERIN) CREA cream APPLY TOPICALLY TO BILATERAL LOWER EXTREMITIES AT BEDTIME OR AS NEEDED 454 g 97     mirtazapine (REMERON) 7.5 MG tablet TAKE 1 TABLET BY MOUTH AT BEDTIME 28 tablet PRN     Multiple Vitamins-Minerals (CEROVITE SENIOR) TABS TAKE 1 TABLET BY MOUTH ONCE DAILY 28 tablet 97     nitroGLYcerin (NITROSTAT) 0.4 MG sublingual tablet PLACE 1 TABLET UNDER TONGUE AT ONSET OF CHEST PAIN. MAY REPEAT EVERY 5 MINUTES AS NEEDED X 3 DOSES 25 tablet 97     triamcinolone (KENALOG) 0.1 % external ointment APPLY TOPICALLY TO LOWER EXTREMITY RASH TWICE DAILY AS NEEDED 80 g 97     VITAMIN D3 25 MCG (1000 UT) tablet TAKE TWO TABLETS (2000 UNITS) BY MOUTH ONCE DAILY  56 tablet 97         REVIEW OF SYSTEMS:  4 point ROS including Respiratory, CV, GI and , other than that noted in the HPI,  is negative    Objective:  Temp 97  F (36.1  C)   Wt 50.3 kg (111 lb)   BMI 24.02 kg/m    Exam:  GENERAL APPEARANCE:  Alert, in no distress, thin  ENT:  [place], oropharynx clear  EYES:  EOM normal, conjunctiva and lids normal  NECK: no adenopathy,masses or thyromegaly  RESP:  lungs clear to auscultation , no respiratory distress  CV:  regular rate and rhythm, no murmur, no edema, +2 pedal pulses  ABDOMEN:  soft, non-tender, no distension, no masses  M/S:   wheelchair. MA with good strength. No joint inflammation  SKIN: chronic stasis discoloration both LE, no erythema. Small superficial open area right posterior calf, clean and dry. Very  small superficial open area left shin clean, dry. No new  open areas  PSYCH:  oriented X 3, memory impaired , affect and mood normal    Labs:   Recent labs in [facility] reviewed by me today.     ASSESSMENT / PLAN:  (S81.249D) Multiple opens wound of lower extremity, unspecified laterality, subsequent encounter  (primary encounter diagnosis)  (I87.2) Peripheral vascular disease with stasis dermatitis  Comment: wounds of both LE are almost healed   Plan: continue wound care with adaptic,dry gauze and kerlix to secure. Tylenol prn. Wound care per [facility].     (L03.116) Cellulitis of left lower extremity  Comment: completed a course of cephalexin 1/3/2022. Infection resolved   Plan: monitor     (G20) Parkinsonism, unspecified Parkinsonism type (H)  Comment: chronic bilateral LE weakness, tremors and freezing. No tremor noted today   Plan: continue sinemet. AL staff assistance with cares, meals and med admin. He and his wife isolate in their apt and don't socialize.       Electronically signed by:  SYBIL Petersen CNP                 Sincerely,        SYBIL Petersen CNP

## 2022-01-18 ENCOUNTER — LAB REQUISITION (OUTPATIENT)
Dept: LAB | Facility: CLINIC | Age: 87
End: 2022-01-18
Payer: COMMERCIAL

## 2022-01-18 DIAGNOSIS — U07.1 COVID-19: ICD-10-CM

## 2022-01-19 ENCOUNTER — ASSISTED LIVING VISIT (OUTPATIENT)
Dept: GERIATRICS | Facility: CLINIC | Age: 87
End: 2022-01-19
Payer: MEDICARE

## 2022-01-19 VITALS — WEIGHT: 111 LBS | BODY MASS INDEX: 24.02 KG/M2 | TEMPERATURE: 97 F

## 2022-01-19 DIAGNOSIS — F33.1 MODERATE EPISODE OF RECURRENT MAJOR DEPRESSIVE DISORDER (H): ICD-10-CM

## 2022-01-19 DIAGNOSIS — G20.C PARKINSONISM, UNSPECIFIED PARKINSONISM TYPE (H): ICD-10-CM

## 2022-01-19 DIAGNOSIS — L03.116 CELLULITIS OF LEFT LOWER EXTREMITY: ICD-10-CM

## 2022-01-19 DIAGNOSIS — I87.2 PERIPHERAL VASCULAR DISEASE WITH STASIS DERMATITIS: ICD-10-CM

## 2022-01-19 DIAGNOSIS — S81.809D MULTIPLE OPENS WOUND OF LOWER EXTREMITY, UNSPECIFIED LATERALITY, SUBSEQUENT ENCOUNTER: Primary | ICD-10-CM

## 2022-01-19 DIAGNOSIS — R41.89 COGNITIVE IMPAIRMENT: ICD-10-CM

## 2022-01-19 DIAGNOSIS — F41.1 GENERALIZED ANXIETY DISORDER: ICD-10-CM

## 2022-01-19 NOTE — LETTER
1/19/2022        RE: Ben Salvador  C/o Torin Salvador  [address] [facility] [address]        [place] [facility]  [place] [facility] Record Number:  [id number]  Place of Service where encounter took place:  [facility] ([facility]) [[id number]]  Chief Complaint   Patient presents with     RECHECK       HPI:    Ben Salvador  is a 89 year old (1/14/1928), who is being seen today for an episodic care visit.  HPI information obtained from: facility chart records, facility staff, patient report and [facility] chart review.  He and his wife have lived at this facility since 1/2020. Medical history significant for Parkinsonism, depression, anxiety, CAD with MI and stent 2003,  CKD stage 3, BPH, HTN, recurrent falls, vitamin B12 deficiency, anemia, gout.        Today's concern is:     Multiple opens wound of lower extremity, unspecified laterality, subsequent encounter  Peripheral vascular disease with stasis dermatitis  Cellulitis of left lower extremity  Parkinsonism, unspecified Parkinsonism type (H)  Moderate episode of recurrent major depressive disorder (H)  Generalized anxiety disorder  Cognitive impairment   He is seen today to follow up on multiple open areas of both LE due to trauma of some kind. He received  A course of cephalexin for cellulitis of the LLE, last dose 1/3/2022. Pending sale to [facility] has been involved for wound care.   He reports feeling well and states that all wounds have healed. Denies pain of his legs. Good appetite. Wheelchair bound, in part due to his fear of falling.  Independent with transfers, toileting and dressing.     Past Medical and Surgical History reviewed in Epic today.    MEDICATIONS:    Current Outpatient Medications   Medication Sig Dispense Refill     acetaminophen (TYLENOL) 325 MG tablet TAKE TWO TABLETS (650 MG) BY MOUTH EVERY 6 HOURS AS NEEDED FOR PAIN 60 tablet 97     allopurinol (ZYLOPRIM) 100 MG tablet TAKE 1 TABLET BY MOUTH ONCE DAILY 28 tablet  97     ASPIRIN LOW DOSE 81 MG chewable tablet CHEW AND SWALLOW ONE TABLET BY MOUTH ONCE DAILY 28 tablet 97     atorvastatin (LIPITOR) 40 MG tablet TAKE 1 TABLET BY MOUTH ONCE DAILY 28 tablet 97     carbidopa-levodopa (SINEMET)  MG tablet TAKE 1 TABLET BY MOUTH TWICE DAILY IN THE MORNING & IN THE EVENING 56 tablet PRN     citalopram (CELEXA) 20 MG tablet TAKE 1 TABLET BY MOUTH ONCE DAILY 28 tablet PRN     cyanocobalamin (VITAMIN B-12) 500 MCG tablet TAKE 1 TABLET BY MOUTH ONCE DAILY 28 tablet PRN     Eucerin external lotion APPLY TOPICALLY TO BILATERAL LOWER EXTREMITIES AS DIRECTED TWICE DAILY 500 mL 97     mineral oil-white petrolatum (EUCERIN) CREA cream APPLY TOPICALLY TO BILATERAL LOWER EXTEMITIES AT AT BEDTIME OR AS NEEDED 454 g 97     mirtazapine (REMERON) 7.5 MG tablet TAKE 1 TABLET BY MOUTH AT BEDTIME 28 tablet PRN     Multiple Vitamins-Minerals (CEROVITE SENIOR) TABS TAKE 1 TABLET BY MOUTH ONCE DAILY 28 tablet 97     nitroGLYcerin (NITROSTAT) 0.4 MG sublingual tablet PLACE 1 TABLET UNDER TONGUE AT ONSET OF CHEST PAIN. MAY REPEAT EVERY 5 MINUTES AS NEEDED X 3 DOSES 25 tablet 97     triamcinolone (KENALOG) 0.1 % external ointment APPLY TOPICALLY TO LOWER EXTREMITY RASH TWICE DAILY AS NEEDED 80 g 97     VITAMIN D3 25 MCG (1000 UT) tablet TAKE TWO TABLETS (2000 UNITS) BY MOUTH ONCE DAILY ***NOTE DOSAGE/STRENGTH*** 56 tablet 97         REVIEW OF SYSTEMS:  4 point ROS including Respiratory, CV, GI and , other than that noted in the HPI,  is negative    Objective:  Temp 97  F (36.1  C)   Wt 50.3 kg (111 lb)   BMI 24.02 kg/m    Exam:  GENERAL APPEARANCE:  Alert, in no distress, thin  ENT:  [place], oropharynx clear  EYES:  EOM normal, conjunctiva and lids normal  NECK: no adenopathy,masses or thyromegaly  RESP:  lungs clear to auscultation , no respiratory distress  CV:  regular rate and rhythm, no murmur, no edema, +2 pedal pulses  ABDOMEN:  soft, non-tender, no distension, no masses  M/S:   wheelchair. FRANCES[place]  with good strength. No joint inflammation  SKIN: chronic stasis discoloration both LE, no erythema. Few scabs both LE, no open areas   PSYCH:  oriented X 3, memory impaired , affect and mood normal    Labs:   Recent labs in [facility] reviewed by me today.     ASSESSMENT / PLAN:  (S81.809D) Multiple opens wound of lower extremity, unspecified laterality, subsequent encounter  (primary encounter diagnosis)  (I87.2) Peripheral vascular disease with stasis dermatitis  Comment: open areas due to trauma, compounded by PVD. All areas have healed.   Plan: closely monitor and protect skin     (L03.116) Cellulitis of left lower extremity  Comment: infection resolved. Completed a course of cephalexin 1/3/2022  Plan: monitor skin     (G20) Parkinsonism, unspecified Parkinsonism type (H)  Comment: wheelchair for mobility due to LE tremors and freezing. Activity is limited by his fear of falling   Plan: continue sinemet.     (F33.1) Moderate episode of recurrent major depressive disorder (H)  (F41.1) Generalized anxiety disorder  Comment: well managed. Pleasant and talkative   Plan: continue citalopram, mirtazapine, trazodone     (R41.89) Cognitive impairment  Comment: mild deficits. Conversation is appropriate  SLUMS 25/30 while on tcu in 2019  Plan: AL staff assistance with meals, cares, med admin.   He and his wife isolate in their apartment.       Electronically signed by:  SYBIL Petersen CNP                 Sincerely,        SYBIL Petersen CNP

## 2022-01-19 NOTE — PROGRESS NOTES
[place] [facility]  [place] [facility] Record Number:  [id number]  Place of Service where encounter took place:  [facility] ([facility]) [[id number]]  Chief Complaint   Patient presents with     RECHECK       HPI:    Ben Salvador  is a 89 year old (1/14/1928), who is being seen today for an episodic care visit.  HPI information obtained from: facility chart records, facility staff, patient report and [facility] chart review.  He and his wife have lived at this facility since 1/2020. Medical history significant for Parkinsonism, depression, anxiety, CAD with MI and stent 2003,  CKD stage 3, BPH, HTN, recurrent falls, vitamin B12 deficiency, anemia, gout.        Today's concern is:     Multiple opens wound of lower extremity, unspecified laterality, subsequent encounter  Peripheral vascular disease with stasis dermatitis  Cellulitis of left lower extremity  Parkinsonism, unspecified Parkinsonism type (H)  Moderate episode of recurrent major depressive disorder (H)  Generalized anxiety disorder  Cognitive impairment   He is seen today to follow up on multiple open areas of both LE due to trauma of some kind. He received  A course of cephalexin for cellulitis of the LLE, last dose 1/3/2022. [facility] has been involved for wound care.   He reports feeling well and states that all wounds have healed. Denies pain of his legs. Good appetite. Wheelchair bound, in part due to his fear of falling.  Independent with transfers, toileting and dressing.     Past Medical and Surgical History reviewed in Epic today.    MEDICATIONS:    Current Outpatient Medications   Medication Sig Dispense Refill     acetaminophen (TYLENOL) 325 MG tablet TAKE TWO TABLETS (650 MG) BY MOUTH EVERY 6 HOURS AS NEEDED FOR PAIN 60 tablet 97     allopurinol (ZYLOPRIM) 100 MG tablet TAKE 1 TABLET BY MOUTH ONCE DAILY 28 tablet 97     ASPIRIN LOW DOSE 81 MG chewable tablet CHEW AND SWALLOW ONE TABLET BY MOUTH ONCE DAILY 28 tablet  97     atorvastatin (LIPITOR) 40 MG tablet TAKE 1 TABLET BY MOUTH ONCE DAILY 28 tablet 97     carbidopa-levodopa (SINEMET)  MG tablet TAKE 1 TABLET BY MOUTH TWICE DAILY IN THE MORNING & IN THE EVENING 56 tablet PRN     citalopram (CELEXA) 20 MG tablet TAKE 1 TABLET BY MOUTH ONCE DAILY 28 tablet PRN     cyanocobalamin (VITAMIN B-12) 500 MCG tablet TAKE 1 TABLET BY MOUTH ONCE DAILY 28 tablet PRN     Eucerin external lotion APPLY TOPICALLY TO BILATERAL LOWER EXTREMITIES AS DIRECTED TWICE DAILY 500 mL 97     mineral oil-white petrolatum (EUCERIN) CREA cream APPLY TOPICALLY TO BILATERAL LOWER EXTEMITIES AT AT BEDTIME OR AS NEEDED 454 g 97     mirtazapine (REMERON) 7.5 MG tablet TAKE 1 TABLET BY MOUTH AT BEDTIME 28 tablet PRN     Multiple Vitamins-Minerals (CEROVITE SENIOR) TABS TAKE 1 TABLET BY MOUTH ONCE DAILY 28 tablet 97     nitroGLYcerin (NITROSTAT) 0.4 MG sublingual tablet PLACE 1 TABLET UNDER TONGUE AT ONSET OF CHEST PAIN. MAY REPEAT EVERY 5 MINUTES AS NEEDED X 3 DOSES 25 tablet 97     triamcinolone (KENALOG) 0.1 % external ointment APPLY TOPICALLY TO LOWER EXTREMITY RASH TWICE DAILY AS NEEDED 80 g 97     VITAMIN D3 25 MCG (1000 UT) tablet TAKE TWO TABLETS (2000 UNITS) BY MOUTH ONCE DAILY  56 tablet 97         REVIEW OF SYSTEMS:  4 point ROS including Respiratory, CV, GI and , other than that noted in the HPI,  is negative    Objective:  Temp 97  F (36.1  C)   Wt 50.3 kg (111 lb)   BMI 24.02 kg/m    Exam:  GENERAL APPEARANCE:  Alert, in no distress, thin  ENT:  [place], oropharynx clear  EYES:  EOM normal, conjunctiva and lids normal  NECK: no adenopathy,masses or thyromegaly  RESP:  lungs clear to auscultation , no respiratory distress  CV:  regular rate and rhythm, no murmur, no edema, +2 pedal pulses  ABDOMEN:  soft, non-tender, no distension, no masses  M/S:   wheelchair. MA with good strength. No joint inflammation  SKIN: chronic stasis discoloration both LE, no erythema. Few scabs both LE, no open  areas   PSYCH:  oriented X 3, memory impaired , affect and mood normal    Labs:   Recent labs in [facility] reviewed by me today.     ASSESSMENT / PLAN:  (S81.809D) Multiple opens wound of lower extremity, unspecified laterality, subsequent encounter  (primary encounter diagnosis)  (I87.2) Peripheral vascular disease with stasis dermatitis  Comment: open areas due to trauma, compounded by PVD. All areas have healed.   Plan: closely monitor and protect skin     (L03.116) Cellulitis of left lower extremity  Comment: infection resolved. Completed a course of cephalexin 1/3/2022  Plan: monitor skin     (G20) Parkinsonism, unspecified Parkinsonism type (H)  Comment: wheelchair for mobility due to LE tremors and freezing. Activity is limited by his fear of falling   Plan: continue sinemet.     (F33.1) Moderate episode of recurrent major depressive disorder (H)  (F41.1) Generalized anxiety disorder  Comment: well managed. Pleasant and talkative   Plan: continue citalopram, mirtazapine, trazodone     (R41.89) Cognitive impairment  Comment: mild deficits. Conversation is appropriate  SLUMS 25/30 while on tcu in 2019  Plan: AL staff assistance with meals, cares, med admin.   He and his wife isolate in their apartment.       Electronically signed by:  SYBIL Petersen CNP

## 2022-01-19 NOTE — LETTER
1/19/2022        RE: Ben Salvador  C/o Torin Salvador  [address] [address]        [place] [facility]  [place] [facility] Record Number:  [id number]  Place of Service where encounter took place:  [facility] ([facility]) [[id number]]  Chief Complaint   Patient presents with     RECHECK       HPI:    Ben Salvador  is a 89 year old (1/14/1928), who is being seen today for an episodic care visit.  HPI information obtained from: facility chart records, facility staff, patient report and [facility] chart review.  He and his wife have lived at this facility since 1/2020. Medical history significant for Parkinsonism, depression, anxiety, CAD with MI and stent 2003,  CKD stage 3, BPH, HTN, recurrent falls, vitamin B12 deficiency, anemia, gout.        Today's concern is:     Multiple opens wound of lower extremity, unspecified laterality, subsequent encounter  Peripheral vascular disease with stasis dermatitis  Cellulitis of left lower extremity  Parkinsonism, unspecified Parkinsonism type (H)  Moderate episode of recurrent major depressive disorder (H)  Generalized anxiety disorder  Cognitive impairment   He is seen today to follow up on multiple open areas of both LE due to trauma of some kind. He received  A course of cephalexin for cellulitis of the LLE, last dose 1/3/2022. [facility] has been involved for wound care.   He reports feeling well and states that all wounds have healed. Denies pain of his legs. Good appetite. Wheelchair bound, in part due to his fear of falling.  Independent with transfers, toileting and dressing.     Past Medical and Surgical History reviewed in Epic today.    MEDICATIONS:    Current Outpatient Medications   Medication Sig Dispense Refill     acetaminophen (TYLENOL) 325 MG tablet TAKE TWO TABLETS (650 MG) BY MOUTH EVERY 6 HOURS AS NEEDED FOR PAIN 60 tablet 97     allopurinol (ZYLOPRIM) 100 MG tablet TAKE 1 TABLET BY MOUTH ONCE DAILY 28 tablet  97     ASPIRIN LOW DOSE 81 MG chewable tablet CHEW AND SWALLOW ONE TABLET BY MOUTH ONCE DAILY 28 tablet 97     atorvastatin (LIPITOR) 40 MG tablet TAKE 1 TABLET BY MOUTH ONCE DAILY 28 tablet 97     carbidopa-levodopa (SINEMET)  MG tablet TAKE 1 TABLET BY MOUTH TWICE DAILY IN THE MORNING & IN THE EVENING 56 tablet PRN     citalopram (CELEXA) 20 MG tablet TAKE 1 TABLET BY MOUTH ONCE DAILY 28 tablet PRN     cyanocobalamin (VITAMIN B-12) 500 MCG tablet TAKE 1 TABLET BY MOUTH ONCE DAILY 28 tablet PRN     Eucerin external lotion APPLY TOPICALLY TO BILATERAL LOWER EXTREMITIES AS DIRECTED TWICE DAILY 500 mL 97     mineral oil-white petrolatum (EUCERIN) CREA cream APPLY TOPICALLY TO BILATERAL LOWER EXTEMITIES AT AT BEDTIME OR AS NEEDED 454 g 97     mirtazapine (REMERON) 7.5 MG tablet TAKE 1 TABLET BY MOUTH AT BEDTIME 28 tablet PRN     Multiple Vitamins-Minerals (CEROVITE SENIOR) TABS TAKE 1 TABLET BY MOUTH ONCE DAILY 28 tablet 97     nitroGLYcerin (NITROSTAT) 0.4 MG sublingual tablet PLACE 1 TABLET UNDER TONGUE AT ONSET OF CHEST PAIN. MAY REPEAT EVERY 5 MINUTES AS NEEDED X 3 DOSES 25 tablet 97     triamcinolone (KENALOG) 0.1 % external ointment APPLY TOPICALLY TO LOWER EXTREMITY RASH TWICE DAILY AS NEEDED 80 g 97     VITAMIN D3 25 MCG (1000 UT) tablet TAKE TWO TABLETS (2000 UNITS) BY MOUTH ONCE DAILY  56 tablet 97         REVIEW OF SYSTEMS:  4 point ROS including Respiratory, CV, GI and , other than that noted in the HPI,  is negative    Objective:  Temp 97  F (36.1  C)   Wt 50.3 kg (111 lb)   BMI 24.02 kg/m    Exam:  GENERAL APPEARANCE:  Alert, in no distress, thin  ENT:  [place], oropharynx clear  EYES:  EOM normal, conjunctiva and lids normal  NECK: no adenopathy,masses or thyromegaly  RESP:  lungs clear to auscultation , no respiratory distress  CV:  regular rate and rhythm, no murmur, no edema, +2 pedal pulses  ABDOMEN:  soft, non-tender, no distension, no masses  M/S:   wheelchair. MA with good strength. No  joint inflammation  SKIN: chronic stasis discoloration both LE, no erythema. Few scabs both LE, no open areas   PSYCH:  oriented X 3, memory impaired , affect and mood normal    Labs:   Recent labs in [facility] reviewed by me today.     ASSESSMENT / PLAN:  (S81.809D) Multiple opens wound of lower extremity, unspecified laterality, subsequent encounter  (primary encounter diagnosis)  (I87.2) Peripheral vascular disease with stasis dermatitis  Comment: open areas due to trauma, compounded by PVD. All areas have healed.   Plan: closely monitor and protect skin     (L03.116) Cellulitis of left lower extremity  Comment: infection resolved. Completed a course of cephalexin 1/3/2022  Plan: monitor skin     (G20) Parkinsonism, unspecified Parkinsonism type (H)  Comment: wheelchair for mobility due to LE tremors and freezing. Activity is limited by his fear of falling   Plan: continue sinemet.     (F33.1) Moderate episode of recurrent major depressive disorder (H)  (F41.1) Generalized anxiety disorder  Comment: well managed. Pleasant and talkative   Plan: continue citalopram, mirtazapine, trazodone     (R41.89) Cognitive impairment  Comment: mild deficits. Conversation is appropriate  SLUMS 25/30 while on tcu in 2019  Plan: AL staff assistance with meals, cares, med admin.   He and his wife isolate in their apartment.       Electronically signed by:  SYBIL Petersen CNP                 Sincerely,        SYBIL Petersen CNP

## 2022-01-27 PROCEDURE — U0003 INFECTIOUS AGENT DETECTION BY NUCLEIC ACID (DNA OR RNA); SEVERE ACUTE RESPIRATORY SYNDROME CORONAVIRUS 2 (SARS-COV-2) (CORONAVIRUS DISEASE [COVID-19]), AMPLIFIED PROBE TECHNIQUE, MAKING USE OF HIGH THROUGHPUT TECHNOLOGIES AS DESCRIBED BY CMS-2020-01-R: HCPCS | Mod: ORL | Performed by: INTERNAL MEDICINE

## 2022-01-28 ENCOUNTER — LAB REQUISITION (OUTPATIENT)
Dept: LAB | Facility: CLINIC | Age: 87
End: 2022-01-28
Payer: MEDICARE

## 2022-01-28 DIAGNOSIS — U07.1 COVID-19: ICD-10-CM

## 2022-01-30 LAB — SARS-COV-2 RNA RESP QL NAA+PROBE: NEGATIVE

## 2022-02-02 PROCEDURE — U0005 INFEC AGEN DETEC AMPLI PROBE: HCPCS | Mod: ORL | Performed by: INTERNAL MEDICINE

## 2022-02-03 ENCOUNTER — LAB REQUISITION (OUTPATIENT)
Dept: LAB | Facility: CLINIC | Age: 87
End: 2022-02-03
Payer: MEDICARE

## 2022-02-03 DIAGNOSIS — U07.1 COVID-19: ICD-10-CM

## 2022-02-04 LAB — SARS-COV-2 RNA RESP QL NAA+PROBE: NOT DETECTED

## 2022-02-07 ENCOUNTER — LAB REQUISITION (OUTPATIENT)
Dept: LAB | Facility: CLINIC | Age: 87
End: 2022-02-07
Payer: MEDICARE

## 2022-02-07 DIAGNOSIS — U07.1 COVID-19: ICD-10-CM

## 2022-02-08 DIAGNOSIS — R63.4 WEIGHT LOSS: ICD-10-CM

## 2022-02-08 PROCEDURE — U0005 INFEC AGEN DETEC AMPLI PROBE: HCPCS | Mod: ORL | Performed by: INTERNAL MEDICINE

## 2022-02-08 RX ORDER — UREA 10 %
500 LOTION (ML) TOPICAL DAILY
Qty: 90 TABLET | Refills: 3 | Status: SHIPPED | OUTPATIENT
Start: 2022-02-08 | End: 2022-01-01

## 2022-02-10 ENCOUNTER — TELEPHONE (OUTPATIENT)
Dept: GERIATRICS | Facility: CLINIC | Age: 87
End: 2022-02-10
Payer: COMMERCIAL

## 2022-02-10 LAB — SARS-COV-2 RNA RESP QL NAA+PROBE: NEGATIVE

## 2022-02-10 NOTE — TELEPHONE ENCOUNTER
Triage Home Care/Hospice Order Request    Provider: SYBIL Gomez CNP   Facility: [facility]  Facility Type:  AL    Agency: [facility] Type: HC - SN  Caller: Davina  Call Back Number: [phone]      Order Request: During dressing change today the adhesive ripped the top layer of skin off; requesting to change orders to non-adherent dressing and one additional visit this weekend.    Verbal orders approved and given to Davina - left     Provider giving order: SYBIL Gomez CNP, RN

## 2022-02-15 ENCOUNTER — TELEPHONE (OUTPATIENT)
Dept: GERIATRICS | Facility: CLINIC | Age: 87
End: 2022-02-15
Payer: COMMERCIAL

## 2022-02-15 DIAGNOSIS — L03.90 CELLULITIS: Primary | ICD-10-CM

## 2022-02-15 RX ORDER — CEPHALEXIN 500 MG/1
500 CAPSULE ORAL 3 TIMES DAILY
Qty: 15 CAPSULE | Refills: 0 | Status: SHIPPED | OUTPATIENT
Start: 2022-02-15 | End: 2022-02-24

## 2022-02-15 RX ORDER — CEPHALEXIN 500 MG/1
500 CAPSULE ORAL 3 TIMES DAILY
COMMUNITY
Start: 2022-02-15 | End: 2022-02-15

## 2022-02-15 NOTE — TELEPHONE ENCOUNTER
[facility] Geriatrics Triage Nurse Telephone Encounter    Provider: SYBIL Gomez CNP   Facility: [facility]  Facility Type:  AL    Caller: Dvaina (Replaced by [facility] RN)  Call Back Number: [phone]    Allergies:  No Known Allergies     Reason for call: Replaced by [facility] homecare nurse called to report that patient's left lower extremity appears to have cellulitis.  Patient has 2 open areas to his left lower extremity one to the medial side and lateral side.  Skin surrounding the medial wound is red and warm.  Edema baseline for patient with no increase.  Patient complaining of pain when wounds were being cleansed.  Serosanguinous drainage present along with scattered purulent.  Temperature 98.9.  Patient was recently treated for cellulitis in early January.    Verbal Order/Direction given by Provider: Start Keflex 500 mg po TID x 5 days for cellulitis.      Provider giving Order:  SYBIL Gomez CNP     Verbal Order given to: Carrie.     Gwen Barrios RN

## 2022-02-16 ENCOUNTER — ASSISTED LIVING VISIT (OUTPATIENT)
Dept: GERIATRICS | Facility: CLINIC | Age: 87
End: 2022-02-16
Payer: MEDICARE

## 2022-02-16 ENCOUNTER — LAB REQUISITION (OUTPATIENT)
Dept: LAB | Facility: CLINIC | Age: 87
End: 2022-02-16
Payer: COMMERCIAL

## 2022-02-16 VITALS — WEIGHT: 111 LBS | BODY MASS INDEX: 24.02 KG/M2 | TEMPERATURE: 97.5 F

## 2022-02-16 DIAGNOSIS — L03.116 CELLULITIS OF LEFT LOWER EXTREMITY: ICD-10-CM

## 2022-02-16 DIAGNOSIS — D64.9 ANEMIA, UNSPECIFIED TYPE: ICD-10-CM

## 2022-02-16 DIAGNOSIS — K12.0 CANKER SORE: ICD-10-CM

## 2022-02-16 DIAGNOSIS — N18.32 STAGE 3B CHRONIC KIDNEY DISEASE (H): ICD-10-CM

## 2022-02-16 DIAGNOSIS — E53.8 VITAMIN B12 DEFICIENCY: ICD-10-CM

## 2022-02-16 DIAGNOSIS — I25.9 CHRONIC ISCHEMIC HEART DISEASE: ICD-10-CM

## 2022-02-16 DIAGNOSIS — F41.1 GENERALIZED ANXIETY DISORDER: ICD-10-CM

## 2022-02-16 DIAGNOSIS — S91.002A OPEN WOUND OF LEFT KNEE, LEG, AND ANKLE, INITIAL ENCOUNTER: Primary | ICD-10-CM

## 2022-02-16 DIAGNOSIS — S81.002A OPEN WOUND OF LEFT KNEE, LEG, AND ANKLE, INITIAL ENCOUNTER: Primary | ICD-10-CM

## 2022-02-16 DIAGNOSIS — S81.802A OPEN WOUND OF LEFT KNEE, LEG, AND ANKLE, INITIAL ENCOUNTER: Primary | ICD-10-CM

## 2022-02-16 DIAGNOSIS — I87.2 PERIPHERAL VASCULAR DISEASE WITH STASIS DERMATITIS: ICD-10-CM

## 2022-02-16 DIAGNOSIS — G20.C PARKINSONISM, UNSPECIFIED PARKINSONISM TYPE (H): ICD-10-CM

## 2022-02-16 DIAGNOSIS — I10 ESSENTIAL HYPERTENSION: ICD-10-CM

## 2022-02-16 DIAGNOSIS — F33.1 MODERATE EPISODE OF RECURRENT MAJOR DEPRESSIVE DISORDER (H): ICD-10-CM

## 2022-02-16 DIAGNOSIS — R41.89 COGNITIVE IMPAIRMENT: ICD-10-CM

## 2022-02-16 DIAGNOSIS — U07.1 COVID-19: ICD-10-CM

## 2022-02-16 NOTE — LETTER
2/16/2022        RE: Ben Salvador  C/o Torin Salvador  [address] [address]        [facility]    Chief Complaint   Patient presents with     RECHECK     HPI:  Ben Salvador is a 89 year old  (1/14/1928), who is being seen today for an episodic care visit at: [facility]) [[id number]].   He and his wife have lived at this facility since 1/2020. Medical history significant for Parkinsonism, depression, anxiety, CAD with MI and stent 2003,  CKD stage 3, BPH, HTN, recurrent falls, vitamin B12 deficiency, anemia, gout.     Today's concern is:      Open wound of left knee, leg, and ankle, initial encounter  Peripheral vascular disease with stasis dermatitis  Cellulitis of left lower extremity  Parkinsonism, unspecified Parkinsonism type (H)  Moderate episode of recurrent major depressive disorder (H)  Generalized anxiety disorder  Chronic ischemic heart disease  Stage 3b chronic kidney disease (H)  Essential hypertension  Anemia, unspecified type  Vitamin B12 deficiency  Canker sore  Cognitive impairment   He is seen today for evaluation of new open areas of his LLE. He recently had open wounds of both LE and all were healed when seen 1/19/2022. He reports the new wounds of his left shin occurred when the home care nurse removed the dressing and a layer of skin came off with the adhesive. This occurred 2/10/2022 and the home care nurse called 2/15/2022 reporting 2 open areas of the left leg and signs of cellulitis. Cephalexin was started. Patient reports pain of his leg has improved. No fevers or chills. He is also concerned about a sore on his inner upper lip due to irritation from a broken tooth.       Allergies, and PMH/PSH reviewed in EPIC today.  REVIEW OF SYSTEMS:  4 point ROS including Respiratory, CV, GI and , other than that noted in the HPI,  is negative    Objective:   Temp 97.5  F (36.4  C)   Wt 50.3 kg (111 lb)   BMI 24.02 kg/m    GENERAL APPEARANCE:   Alert, in no distress  ENT:  [place], shallow ulcer inner upper lip, poor dentition   EYES:  EOM normal, conjunctiva and lids normal  NECK:  no adenopathy,masses or thyromegaly  RESP:  lungs clear to auscultation , no respiratory distress  CV:  regular rate and rhythm, no murmur,  no edema, + pedal pulses   ABDOMEN:  soft, non-tender, no distension, no masses  M/S:   wheelchair. MA with good strength. No joint inflammation  SKIN:  2 superficial open areas across the left shin, both clean with granulation, no drainage. Chronic stasis discoloration both LE, no bright erythema. No open wounds RLE  PSYCH:  oriented X 3, memory impaired , affect and mood normal    Recent labs in [facility] reviewed by me today.     ASSESSMENT / PLAN:  (S81.002A,  S81.802A,  S91.002A) Open wound of left knee, leg, and ankle, initial encounter  (primary encounter diagnosis)  (I87.2) Peripheral vascular disease with stasis dermatitis  (L03.116) Cellulitis of left lower extremity  Comment: very fragile skin of both LE. Wounds are clean and without signs of infection   Plan: continue wound care using adaptic and dry dressing. Complete cephalexin 2/20/2022. [facility] nurse is involved for wound care.     (G20) Parkinsonism, unspecified Parkinsonism type (H)  Comment: LE tremors and freezing symptoms improved with sinemet  Plan: continue sinemet     (F33.1) Moderate episode of recurrent major depressive disorder (H)  (F41.1) Generalized anxiety disorder  Comment: pleasant and cooperative. Mobility and functional status limited by his anxiety and fear of falling   Plan: continue mirtazapine, citalopram, trazodone. Wheelchair for mobility     (I25.9) Chronic ischemic heart disease  Comment:history of MI with stent placement in 2003. No acute cardiac issues.   ECHO 11/5/2016:  EF 45%  Plan: continue ASA and statin      (N18.32) Stage 3b chronic kidney disease (H)  Comment: baseline creatinine ~1.9. He has declined recent labs-will discuss  again  Plan: avoid nephrotoxins. Renal dosing of meds    (I10) Essential hypertension  Comment: per history and no longer on treatment. No recent VS to review  Plan: monitor VS per facility routine     (D64.9) Anemia, unspecified type  (E53.8) Vitamin B12 deficiency  Comment:anemia likely multifactorial, history of macrocytosis. Baseline Hgb mid 9s-10.  He has recently declined having labs drawn-will discuss again in the near future.   Plan: continue B12 supplement.     (K12.0) Canker sore  Comment: small ulcer inner upper lip, irritated by broken tooth   Plan: good oral hygiene, warm salt water rinses. Recommend dental appointment.     (R41.89) Cognitive impairment  Comment: conversation appropriate. SLUMS 25/30 while on tcu in 2019  Plan: AL staff assistance with cares, meals and med admin. He and his wife isolate in their apartment and only leave for haircuts.         Electronically signed by: SYBIL Petersen CNP             Sincerely,        SYBIL Petersen CNP

## 2022-02-16 NOTE — PROGRESS NOTES
[facility] GERIATRICS    Chief Complaint   Patient presents with     RECHECK     HPI:  Ben Salvador is a 89 year old  (1/14/1928), who is being seen today for an episodic care visit at: [facility]) [[id number]].   He and his wife have lived at this facility since 1/2020. Medical history significant for Parkinsonism, depression, anxiety, CAD with MI and stent 2003,  CKD stage 3, BPH, HTN, recurrent falls, vitamin B12 deficiency, anemia, gout.     Today's concern is:      Open wound of left knee, leg, and ankle, initial encounter  Peripheral vascular disease with stasis dermatitis  Cellulitis of left lower extremity  Parkinsonism, unspecified Parkinsonism type (H)  Moderate episode of recurrent major depressive disorder (H)  Generalized anxiety disorder  Chronic ischemic heart disease  Stage 3b chronic kidney disease (H)  Essential hypertension  Anemia, unspecified type  Vitamin B12 deficiency  Canker sore  Cognitive impairment   He is seen today for evaluation of new open areas of his LLE. He recently had open wounds of both LE and all were healed when seen 1/19/2022. He reports the new wounds of his left shin occurred when the home care nurse removed the dressing and a layer of skin came off with the adhesive. This occurred 2/10/2022 and the home care nurse called 2/15/2022 reporting 2 open areas of the left leg and signs of cellulitis. Cephalexin was started. Patient reports pain of his leg has improved. No fevers or chills. He is also concerned about a sore on his inner upper lip due to irritation from a broken tooth.       Allergies, and PMH/PSH reviewed in EPIC today.  REVIEW OF SYSTEMS:  4 point ROS including Respiratory, CV, GI and , other than that noted in the HPI,  is negative    Objective:   Temp 97.5  F (36.4  C)   Wt 50.3 kg (111 lb)   BMI 24.02 kg/m    GENERAL APPEARANCE:  Alert, in no distress  ENT:  [place], shallow ulcer inner upper lip, poor dentition   EYES:  EOM normal,  conjunctiva and lids normal  NECK:  no adenopathy,masses or thyromegaly  RESP:  lungs clear to auscultation , no respiratory distress  CV:  regular rate and rhythm, no murmur,  no edema, + pedal pulses   ABDOMEN:  soft, non-tender, no distension, no masses  M/S:   wheelchair. MA with good strength. No joint inflammation  SKIN:  2 superficial open areas across the left shin, both clean with granulation, no drainage. Chronic stasis discoloration both LE, no bright erythema. No open wounds RLE  PSYCH:  oriented X 3, memory impaired , affect and mood normal    Recent labs in [facility] reviewed by me today.     ASSESSMENT / PLAN:  (S81.002A,  S81.802A,  S91.002A) Open wound of left knee, leg, and ankle, initial encounter  (primary encounter diagnosis)  (I87.2) Peripheral vascular disease with stasis dermatitis  (L03.116) Cellulitis of left lower extremity  Comment: very fragile skin of both LE. Wounds are clean and without signs of infection   Plan: continue wound care using adaptic and dry dressing. Complete cephalexin 2/20/2022. [facility] nurse is involved for wound care.     (G20) Parkinsonism, unspecified Parkinsonism type (H)  Comment: LE tremors and freezing symptoms improved with sinemet  Plan: continue sinemet     (F33.1) Moderate episode of recurrent major depressive disorder (H)  (F41.1) Generalized anxiety disorder  Comment: pleasant and cooperative. Mobility and functional status limited by his anxiety and fear of falling   Plan: continue mirtazapine, citalopram, trazodone. Wheelchair for mobility     (I25.9) Chronic ischemic heart disease  Comment:history of MI with stent placement in 2003. No acute cardiac issues.   ECHO 11/5/2016:  EF 45%  Plan: continue ASA and statin      (N18.32) Stage 3b chronic kidney disease (H)  Comment: baseline creatinine ~1.9. He has declined recent labs-will discuss again  Plan: avoid nephrotoxins. Renal dosing of meds    (I10) Essential hypertension  Comment: per history  and no longer on treatment. No recent VS to review  Plan: monitor VS per facility routine     (D64.9) Anemia, unspecified type  (E53.8) Vitamin B12 deficiency  Comment:anemia likely multifactorial, history of macrocytosis. Baseline Hgb mid 9s-10.  He has recently declined having labs drawn-will discuss again in the near future.   Plan: continue B12 supplement.     (K12.0) Canker sore  Comment: small ulcer inner upper lip, irritated by broken tooth   Plan: good oral hygiene, warm salt water rinses. Recommend dental appointment.     (R41.89) Cognitive impairment  Comment: conversation appropriate. SLUMS 25/30 while on tcu in 2019  Plan: [place] staff assistance with cares, meals and med admin. He and his wife isolate in their apartment and only leave for haircuts.         Electronically signed by: SYBIL Petersen CNP

## 2022-02-22 ENCOUNTER — LAB REQUISITION (OUTPATIENT)
Dept: LAB | Facility: CLINIC | Age: 87
End: 2022-02-22
Payer: MEDICARE

## 2022-02-22 VITALS — WEIGHT: 111 LBS | TEMPERATURE: 97.7 F | BODY MASS INDEX: 24.02 KG/M2

## 2022-02-22 DIAGNOSIS — U07.1 COVID-19: ICD-10-CM

## 2022-02-22 PROCEDURE — U0005 INFEC AGEN DETEC AMPLI PROBE: HCPCS | Mod: ORL | Performed by: INTERNAL MEDICINE

## 2022-02-22 NOTE — PROGRESS NOTES
[facility] GERIATRICS    Chief Complaint   Patient presents with     RECHECK     HPI:  Ben Salvador is a 89 year old  (1/14/1928), who is being seen today for an episodic care visit at: [facility]) [[id number]].   He and his wife have lived at this facility since 1/2020. Medical history significant for Parkinsonism, depression, anxiety, CAD with MI and stent 2003,  CKD stage 3, BPH, HTN, recurrent falls, vitamin B12 deficiency, anemia, gout.     Today's concern is:      Multiple open wounds of left lower extremity, subsequent encounter  Cellulitis of left lower extremity  Peripheral vascular disease with stasis dermatitis  Canker sore  Parkinsonism, unspecified Parkinsonism type (H)  Generalized anxiety disorder   He is seen today to follow up on recurrent open areas of the LLE. Completed a course of 2/20/2022. Reports pain has improved. Afebrile. Reports the sore of his upper inner lip isn't improving and is irritated by a broken tooth. Area is painful with eating. Feeling well, but is somewhat anxious today (in part due to his wife's behavior).       Allergies, and PMH/PSH reviewed in EPIC today    REVIEW OF SYSTEMS:  4 point ROS including Respiratory, CV, GI and , other than that noted in the HPI,  is negative    Objective:   Temp 97.7  F (36.5  C)   Wt 50.3 kg (111 lb)   BMI 24.02 kg/m    GENERAL APPEARANCE:  Alert, in no distress  ENT:  [place], shallow ulcer inner upper lip, poor dentition   EYES:  EOM normal, conjunctiva and lids normal  NECK:  no adenopathy,masses or thyromegaly  RESP:  lungs clear to auscultation , no respiratory distress  CV:  regular rate and rhythm, no murmur,  no edema    ABDOMEN:  soft, non-tender, no distension, no masses  M/S:   wheelchair. MA with good strength. No joint inflammation  SKIN: 2 superficial open areas across the left shin, clean with granulation, no drainage. Chronic stasis discoloration both LE, no warmth or erythema.  No open wounds RLE  PSYCH:   oriented X 3, memory impaired , affect and mood normal        Recent labs in EPIC reviewed by me today.     ASSESSMENT / PLAN:  (S85.109H) Multiple open wounds of left lower extremity, subsequent encounter  (primary encounter diagnosis)  (L03.116) Cellulitis of left lower extremity  (I87.2) Peripheral vascular disease with stasis dermatitis  Comment: wounds are healing. Cellulitis appears resolved. Completed cephalexin 2/20/2022.   Plan: continue wound care using adaptic and dry dressing. Discussed with nurse from [facility] who provides wound care MWF.     (K12.0) Canker sore  Comment: sore on upper inner lip irritated by a broken tooth. He doesn't want to leave his apt to see a dentist   Plan: anbesol prior to meals and HS. He may self administer  Discussed with staff     (G20) Parkinsonism, unspecified Parkinsonism type (H)  Comment: LE tremors and freezing symptoms have improved with sinemet. Wheelchair for mobility due to his anxiety and fear of falling.   Plan: continue sinemet     (F41.1) Generalized anxiety disorder  Comment: mild anxiety today, but overall his mood has been good. He and his wife isolate in their apt and only leave for haircut.   Plan: continue mirtazapine, citalopram, trazodone.         Electronically signed by: SYBIL Petersen CNP

## 2022-02-23 ENCOUNTER — ASSISTED LIVING VISIT (OUTPATIENT)
Dept: GERIATRICS | Facility: CLINIC | Age: 87
End: 2022-02-23
Payer: MEDICARE

## 2022-02-23 DIAGNOSIS — G20.C PARKINSONISM, UNSPECIFIED PARKINSONISM TYPE (H): ICD-10-CM

## 2022-02-23 DIAGNOSIS — K12.0 CANKER SORE: ICD-10-CM

## 2022-02-23 DIAGNOSIS — S81.802D MULTIPLE OPEN WOUNDS OF LEFT LOWER EXTREMITY, SUBSEQUENT ENCOUNTER: Primary | ICD-10-CM

## 2022-02-23 DIAGNOSIS — L03.116 CELLULITIS OF LEFT LOWER EXTREMITY: ICD-10-CM

## 2022-02-23 DIAGNOSIS — I87.2 PERIPHERAL VASCULAR DISEASE WITH STASIS DERMATITIS: ICD-10-CM

## 2022-02-23 DIAGNOSIS — F41.1 GENERALIZED ANXIETY DISORDER: ICD-10-CM

## 2022-02-23 LAB — SARS-COV-2 RNA RESP QL NAA+PROBE: NEGATIVE

## 2022-02-23 NOTE — LETTER
2/23/2022        RE: Ben Salvador  C/o Torin Salvador  [address] [address]        [facility] GERIATRICS    Chief Complaint   Patient presents with     RECHECK     HPI:  Ben Salvador is a 89 year old  (1/14/1928), who is being seen today for an episodic care visit at: [facility]) [[id number]].   He and his wife have lived at this facility since 1/2020. Medical history significant for Parkinsonism, depression, anxiety, CAD with MI and stent 2003,  CKD stage 3, BPH, HTN, recurrent falls, vitamin B12 deficiency, anemia, gout.     Today's concern is:      Multiple open wounds of left lower extremity, subsequent encounter  Cellulitis of left lower extremity  Peripheral vascular disease with stasis dermatitis  Canker sore  Parkinsonism, unspecified Parkinsonism type (H)  Generalized anxiety disorder   He is seen today to follow up on recurrent open areas of the LLE. Completed a course of 2/20/2022. Reports pain has improved. Afebrile. Reports the sore of his upper inner lip isn't improving and is irritated by a broken tooth. Area is painful with eating. Feeling well, but is somewhat anxious today (in part due to his wife's behavior).       Allergies, and PMH/PSH reviewed in EPIC today    REVIEW OF SYSTEMS:  4 point ROS including Respiratory, CV, GI and , other than that noted in the HPI,  is negative    Objective:   Temp 97.7  F (36.5  C)   Wt 50.3 kg (111 lb)   BMI 24.02 kg/m    GENERAL APPEARANCE:  Alert, in no distress  ENT:  [place], shallow ulcer inner upper lip, poor dentition   EYES:  EOM normal, conjunctiva and lids normal  NECK:  no adenopathy,masses or thyromegaly  RESP:  lungs clear to auscultation , no respiratory distress  CV:  regular rate and rhythm, no murmur,  no edema    ABDOMEN:  soft, non-tender, no distension, no masses  M/S:   wheelchair. MA with good strength. No joint inflammation  SKIN: 2 superficial open areas across the left shin, clean with  granulation, no drainage. Chronic stasis discoloration both LE, no warmth or erythema.  No open wounds RLE  PSYCH:  oriented X 3, memory impaired , affect and mood normal        Recent labs in [facility] reviewed by me today.     ASSESSMENT / PLAN:  (S86.771C) Multiple open wounds of left lower extremity, subsequent encounter  (primary encounter diagnosis)  (L03.116) Cellulitis of left lower extremity  (I87.2) Peripheral vascular disease with stasis dermatitis  Comment: wounds are healing. Cellulitis appears resolved. Completed cephalexin 2/20/2022.   Plan: continue wound care using adaptic and dry dressing. Discussed with nurse from [facility] who provides wound care MW.     (K12.0) Canker sore  Comment: sore on upper inner lip irritated by a broken tooth. He doesn't want to leave his apt to see a dentist   Plan: anbesol prior to meals and HS. He may self administer  Discussed with staff     (G20) Parkinsonism, unspecified Parkinsonism type (H)  Comment: LE tremors and freezing symptoms have improved with sinemet. Wheelchair for mobility due to his anxiety and fear of falling.   Plan: continue sinemet     (F41.1) Generalized anxiety disorder  Comment: mild anxiety today, but overall his mood has been good. He and his wife isolate in their apt and only leave for haircut.   Plan: continue mirtazapine, citalopram, trazodone.         Electronically signed by: SYBIL Petersen CNP             Sincerely,        SYBIL Petersen CNP

## 2022-02-24 RX ORDER — TRAZODONE HYDROCHLORIDE 50 MG/1
25 TABLET, FILM COATED ORAL 2 TIMES DAILY
COMMUNITY
End: 2022-02-24

## 2022-02-24 RX ORDER — TRAZODONE HYDROCHLORIDE 50 MG/1
25 TABLET, FILM COATED ORAL 2 TIMES DAILY
Qty: 30 TABLET | Refills: 11 | Status: SHIPPED | OUTPATIENT
Start: 2022-02-24 | End: 2022-04-19

## 2022-04-07 NOTE — PROGRESS NOTES
"Chief Complaint: I have no appetite and I can't sleep     HPI:    Ben Salvador is a 89 year old  (1/14/1928), who is being seen today for an episodic care visit at [facility]). Today's concern is:  Patient resting in wheelchair by wife's side. Pleasant and happy to see writer.   -Report he is having difficulty sleeping. Wakes around 3:30 and can't fall back asleep.  -Able to eat breakfast but has no appetite for lunch and dinner. \"nlthing tastes good to me\". Taking remeron at HS.   -Has chronic PCD and statis dermatitis and drying of skin. Numerous lotions ordered and patient is frustrated with staff.       REVIEW OF SYSTEMS:  4 point ROS including Respiratory, CV, GI and , other than that noted in the HPI,  is negative    /62   Pulse (!) 37   Temp 97.8  F (36.6  C)   SpO2 96%   GENERAL APPEARANCE:  Alert,oriented x 3 in no distress. Lungs CTA, S1/S2 without M/G/R . No edema. Abdomen is soft, +BTS. No focal neurological deficits.      Assessment/Plan:  (F51.01) Primary insomnia  (primary encounter diagnosis)  Comment: Acute, unable to sleep at night; affecting quality of care.   Plan:   -Increase trazodone 100 mg HS.   -Monitor     (L85.3) Dry skin  (I87.2) Peripheral vascular disease with stasis dermatitis  Comment: Chronic, stable. Numerous creams ordered   Plan: Discontinue Minerin cream and use Eurcerin cream BID.    (R63.0) Lack of appetite  Comment: Chronic, worsening. Unstable and affecting patient.   Plan:   -Increase Remeron 15 mg/HS  -Monitor weight.             Electronically Signed by:  Michelle Leach MA    "

## 2022-04-08 ENCOUNTER — ASSISTED LIVING VISIT (OUTPATIENT)
Dept: GERIATRICS | Facility: CLINIC | Age: 87
End: 2022-04-08
Payer: MEDICARE

## 2022-04-08 VITALS
OXYGEN SATURATION: 96 % | SYSTOLIC BLOOD PRESSURE: 132 MMHG | HEART RATE: 37 BPM | TEMPERATURE: 97.8 F | DIASTOLIC BLOOD PRESSURE: 62 MMHG

## 2022-04-08 DIAGNOSIS — R63.0 LACK OF APPETITE: ICD-10-CM

## 2022-04-08 DIAGNOSIS — F51.01 PRIMARY INSOMNIA: Primary | ICD-10-CM

## 2022-04-08 DIAGNOSIS — I87.2 PERIPHERAL VASCULAR DISEASE WITH STASIS DERMATITIS: ICD-10-CM

## 2022-04-08 DIAGNOSIS — L85.3 DRY SKIN: ICD-10-CM

## 2022-04-09 DIAGNOSIS — F41.9 ANXIETY: ICD-10-CM

## 2022-04-09 DIAGNOSIS — G47.00 INSOMNIA, UNSPECIFIED TYPE: Primary | ICD-10-CM

## 2022-04-11 DIAGNOSIS — M10.9 GOUT, UNSPECIFIED CAUSE, UNSPECIFIED CHRONICITY, UNSPECIFIED SITE: ICD-10-CM

## 2022-04-11 DIAGNOSIS — R63.4 WEIGHT LOSS: ICD-10-CM

## 2022-04-11 DIAGNOSIS — I10 ESSENTIAL HYPERTENSION: ICD-10-CM

## 2022-04-11 RX ORDER — VITAMIN B COMPLEX
TABLET ORAL
Qty: 90 TABLET | Refills: 3 | Status: CANCELLED | OUTPATIENT
Start: 2022-04-11

## 2022-04-11 RX ORDER — TRAZODONE HYDROCHLORIDE 100 MG/1
TABLET ORAL
Qty: 31 TABLET | Refills: 11 | Status: SHIPPED | OUTPATIENT
Start: 2022-04-11 | End: 2023-01-01

## 2022-04-11 RX ORDER — MULTIVIT-MIN/FA/LYCOPEN/LUTEIN .4-300-25
1 TABLET ORAL DAILY
Qty: 90 TABLET | Refills: 3 | Status: SHIPPED | OUTPATIENT
Start: 2022-04-11 | End: 2023-01-01

## 2022-04-11 RX ORDER — ASPIRIN 81 MG/1
TABLET, CHEWABLE ORAL
Qty: 90 TABLET | Refills: 3 | Status: SHIPPED | OUTPATIENT
Start: 2022-04-11 | End: 2023-01-01

## 2022-04-11 RX ORDER — VITAMIN B COMPLEX
2 TABLET ORAL DAILY
Qty: 180 TABLET | Refills: 3 | Status: SHIPPED | OUTPATIENT
Start: 2022-04-11 | End: 2023-01-01

## 2022-04-11 RX ORDER — ALLOPURINOL 100 MG/1
100 TABLET ORAL DAILY
Qty: 90 TABLET | Refills: 3 | Status: SHIPPED | OUTPATIENT
Start: 2022-04-11 | End: 2023-01-01

## 2022-04-11 RX ORDER — ATORVASTATIN CALCIUM 40 MG/1
40 TABLET, FILM COATED ORAL DAILY
Qty: 90 TABLET | Refills: 3 | Status: SHIPPED | OUTPATIENT
Start: 2022-04-11 | End: 2023-01-01

## 2022-04-11 RX ORDER — MIRTAZAPINE 15 MG/1
TABLET, FILM COATED ORAL
Qty: 31 TABLET | Refills: 11 | Status: SHIPPED | OUTPATIENT
Start: 2022-04-11 | End: 2023-01-01

## 2022-04-15 PROCEDURE — U0003 INFECTIOUS AGENT DETECTION BY NUCLEIC ACID (DNA OR RNA); SEVERE ACUTE RESPIRATORY SYNDROME CORONAVIRUS 2 (SARS-COV-2) (CORONAVIRUS DISEASE [COVID-19]), AMPLIFIED PROBE TECHNIQUE, MAKING USE OF HIGH THROUGHPUT TECHNOLOGIES AS DESCRIBED BY CMS-2020-01-R: HCPCS | Mod: ORL | Performed by: INTERNAL MEDICINE

## 2022-04-16 ENCOUNTER — LAB REQUISITION (OUTPATIENT)
Dept: LAB | Facility: CLINIC | Age: 87
End: 2022-04-16
Payer: MEDICARE

## 2022-04-16 DIAGNOSIS — U07.1 COVID-19: ICD-10-CM

## 2022-04-16 LAB — SARS-COV-2 RNA RESP QL NAA+PROBE: NEGATIVE

## 2022-04-19 RX ORDER — MIRTAZAPINE 15 MG/1
15 TABLET, FILM COATED ORAL AT BEDTIME
Start: 2022-04-19 | End: 2022-04-19

## 2022-04-20 ENCOUNTER — LAB REQUISITION (OUTPATIENT)
Dept: LAB | Facility: CLINIC | Age: 87
End: 2022-04-20
Payer: MEDICARE

## 2022-04-20 DIAGNOSIS — U07.1 COVID-19: ICD-10-CM

## 2022-04-20 PROCEDURE — U0005 INFEC AGEN DETEC AMPLI PROBE: HCPCS | Mod: ORL | Performed by: INTERNAL MEDICINE

## 2022-04-21 LAB — SARS-COV-2 RNA RESP QL NAA+PROBE: NEGATIVE

## 2022-04-27 ENCOUNTER — LAB REQUISITION (OUTPATIENT)
Dept: LAB | Facility: CLINIC | Age: 87
End: 2022-04-27
Payer: MEDICARE

## 2022-04-27 DIAGNOSIS — U07.1 COVID-19: ICD-10-CM

## 2022-04-27 PROCEDURE — U0005 INFEC AGEN DETEC AMPLI PROBE: HCPCS | Mod: ORL | Performed by: INTERNAL MEDICINE

## 2022-04-28 LAB — SARS-COV-2 RNA RESP QL NAA+PROBE: NEGATIVE

## 2022-05-03 ENCOUNTER — LAB REQUISITION (OUTPATIENT)
Dept: LAB | Facility: CLINIC | Age: 87
End: 2022-05-03
Payer: MEDICARE

## 2022-05-03 DIAGNOSIS — U07.1 COVID-19: ICD-10-CM

## 2022-05-04 DIAGNOSIS — R25.3 MUSCLE TWITCHING: ICD-10-CM

## 2022-05-04 DIAGNOSIS — F41.1 GENERALIZED ANXIETY DISORDER: ICD-10-CM

## 2022-05-04 PROCEDURE — U0005 INFEC AGEN DETEC AMPLI PROBE: HCPCS | Mod: ORL | Performed by: INTERNAL MEDICINE

## 2022-05-04 RX ORDER — CARBIDOPA/LEVODOPA 10MG-100MG
TABLET ORAL
Qty: 180 TABLET | Refills: 3 | Status: SHIPPED | OUTPATIENT
Start: 2022-05-04 | End: 2023-01-01

## 2022-05-04 RX ORDER — CITALOPRAM HYDROBROMIDE 20 MG/1
TABLET ORAL
Qty: 90 TABLET | Refills: 3 | Status: SHIPPED | OUTPATIENT
Start: 2022-05-04 | End: 2023-01-01

## 2022-05-05 LAB — SARS-COV-2 RNA RESP QL NAA+PROBE: NEGATIVE

## 2022-05-09 ENCOUNTER — LAB REQUISITION (OUTPATIENT)
Dept: LAB | Facility: CLINIC | Age: 87
End: 2022-05-09
Payer: MEDICARE

## 2022-05-09 DIAGNOSIS — U07.1 COVID-19: ICD-10-CM

## 2022-05-11 PROCEDURE — U0003 INFECTIOUS AGENT DETECTION BY NUCLEIC ACID (DNA OR RNA); SEVERE ACUTE RESPIRATORY SYNDROME CORONAVIRUS 2 (SARS-COV-2) (CORONAVIRUS DISEASE [COVID-19]), AMPLIFIED PROBE TECHNIQUE, MAKING USE OF HIGH THROUGHPUT TECHNOLOGIES AS DESCRIBED BY CMS-2020-01-R: HCPCS | Mod: ORL | Performed by: INTERNAL MEDICINE

## 2022-05-12 LAB — SARS-COV-2 RNA RESP QL NAA+PROBE: NEGATIVE

## 2022-05-17 ENCOUNTER — TELEPHONE (OUTPATIENT)
Dept: GERIATRICS | Facility: CLINIC | Age: 87
End: 2022-05-17
Payer: COMMERCIAL

## 2022-05-17 ENCOUNTER — LAB REQUISITION (OUTPATIENT)
Dept: LAB | Facility: CLINIC | Age: 87
End: 2022-05-17
Payer: MEDICARE

## 2022-05-17 DIAGNOSIS — U07.1 COVID-19: ICD-10-CM

## 2022-05-17 NOTE — TELEPHONE ENCOUNTER
Triage Home Care/Hospice Order Request    Provider: SYBIL Maurice CNP  Facility: [facility]  Facility Type:  AL    Agency: [facility] Type: HC - SN  Caller: Shirin  Call Back Number: [phone]      Order Request:   - Change OT eval/tx to PT eval/tx for lymphedema per agency requirments  - Wound care orders - Silvasorb cleanse w/ Silvasorb adaptic dressing covered with ABD pad and Kerlix 2x/week  - Change parameter for notifying PCP of HR if less than 40 (this is pt's baseline HR)    Verbal orders approved and given to Shirin BE    Provider giving order: SYBIL Maurice CNP, RN

## 2022-05-18 PROCEDURE — U0003 INFECTIOUS AGENT DETECTION BY NUCLEIC ACID (DNA OR RNA); SEVERE ACUTE RESPIRATORY SYNDROME CORONAVIRUS 2 (SARS-COV-2) (CORONAVIRUS DISEASE [COVID-19]), AMPLIFIED PROBE TECHNIQUE, MAKING USE OF HIGH THROUGHPUT TECHNOLOGIES AS DESCRIBED BY CMS-2020-01-R: HCPCS | Mod: ORL | Performed by: INTERNAL MEDICINE

## 2022-05-19 LAB — SARS-COV-2 RNA RESP QL NAA+PROBE: NEGATIVE

## 2022-06-10 ENCOUNTER — TELEPHONE (OUTPATIENT)
Dept: GERIATRICS | Facility: CLINIC | Age: 87
End: 2022-06-10
Payer: MEDICARE

## 2022-06-10 NOTE — TELEPHONE ENCOUNTER
[facility] Geriatrics Triage Nurse Telephone Encounter    Provider: SYBIL Maurice CNP  Facility: [facility]  Facility Type:  AL    Caller: Shirin (HC nurse)  Call Back Number: [phone]    Allergies:  No Known Allergies     Reason for call: HC nurse called to report possible cellulitis to his left lower extremity.  Left leg is red all the way around.  No warmth or increase in pain.  No fever present.  Several pus filled blisters noted all over left leg.  Nurse did wound cares the other day and patient's leg did not look like this.          Verbal Order/Direction given by Provider: Start Keflex 500 mg po BID x 10 days for cellulitis.      Provider giving Order:  SYBIL Maurice CNP    Verbal Order given to: Joanne Barrios RN

## 2022-06-24 ENCOUNTER — LAB REQUISITION (OUTPATIENT)
Dept: LAB | Facility: CLINIC | Age: 87
End: 2022-06-24
Payer: MEDICARE

## 2022-06-24 DIAGNOSIS — U07.1 COVID-19: ICD-10-CM

## 2022-06-24 PROCEDURE — U0005 INFEC AGEN DETEC AMPLI PROBE: HCPCS | Mod: ORL | Performed by: INTERNAL MEDICINE

## 2022-06-25 LAB — SARS-COV-2 RNA RESP QL NAA+PROBE: NEGATIVE

## 2022-06-27 ENCOUNTER — LAB REQUISITION (OUTPATIENT)
Dept: LAB | Facility: CLINIC | Age: 87
End: 2022-06-27
Payer: MEDICARE

## 2022-06-27 DIAGNOSIS — U07.1 COVID-19: ICD-10-CM

## 2022-06-28 PROCEDURE — U0003 INFECTIOUS AGENT DETECTION BY NUCLEIC ACID (DNA OR RNA); SEVERE ACUTE RESPIRATORY SYNDROME CORONAVIRUS 2 (SARS-COV-2) (CORONAVIRUS DISEASE [COVID-19]), AMPLIFIED PROBE TECHNIQUE, MAKING USE OF HIGH THROUGHPUT TECHNOLOGIES AS DESCRIBED BY CMS-2020-01-R: HCPCS | Mod: ORL | Performed by: INTERNAL MEDICINE

## 2022-06-29 LAB — SARS-COV-2 RNA RESP QL NAA+PROBE: NEGATIVE

## 2022-07-06 ENCOUNTER — ASSISTED LIVING VISIT (OUTPATIENT)
Dept: GERIATRICS | Facility: CLINIC | Age: 87
End: 2022-07-06
Payer: MEDICARE

## 2022-07-06 VITALS — WEIGHT: 111 LBS | BODY MASS INDEX: 21.79 KG/M2 | TEMPERATURE: 90.6 F | HEIGHT: 60 IN

## 2022-07-06 DIAGNOSIS — D46.9 MYELODYSPLASTIC SYNDROME (H): ICD-10-CM

## 2022-07-06 DIAGNOSIS — I25.118 CORONARY ARTERY DISEASE OF NATIVE HEART WITH STABLE ANGINA PECTORIS, UNSPECIFIED VESSEL OR LESION TYPE (H): ICD-10-CM

## 2022-07-06 DIAGNOSIS — E44.0 MODERATE PROTEIN MALNUTRITION (H): Primary | ICD-10-CM

## 2022-07-06 NOTE — PROGRESS NOTES
"[facility] GERIATRICS    Chief Complaint   Patient presents with     [facility] Care Coordination - Regulatory     HPI:  Ben Salvador is a 89 year old  (1/14/1928), who is being seen today for an episodic care visit at: [facility]) [[id number]]. Today's concern is: Patient resting in wheelchair in room, spouse at side. Reports feeling well today and is without concern. Eating and sleeping ok, no difficulties with constipation. Mood is stable and feels he is not depressed at this time. Working with wound care and unable to graduate 2/2 new wounds, some concerned noted. Denies CP, SOB or lightheadedness.     BP Readings from Last 3 Encounters:   04/07/22 132/62   02/09/21 (!) 166/75   02/02/21 (!) 166/75     Pulse Readings from Last 4 Encounters:   04/07/22 (!) 37   02/09/21 56   02/02/21 56   10/21/20 66     Wt Readings from Last 4 Encounters:   07/06/22 50.3 kg (111 lb)   02/22/22 50.3 kg (111 lb)   02/16/22 50.3 kg (111 lb)   01/19/22 50.3 kg (111 lb)       Allergies, and PMH/PSH reviewed in EPIC today.  REVIEW OF SYSTEMS:  10 point ROS of systems including Constitutional, Eyes, Respiratory, Cardiovascular, Gastroenterology, Genitourinary, Integumentary, Musculoskeletal, Psychiatric were all negative except for pertinent positives noted in my HPI.    Objective:   Temp (!) 90.6  F (32.6  C)   Ht 1.448 m (4' 9\")   Wt 50.3 kg (111 lb)   BMI 24.02 kg/m    GENERAL APPEARANCE:  Alert, in no distress  ENT:  Mouth and posterior oropharynx normal, moist mucous membranes, normal hearing acuity  RESP:  respiratory effort and palpation of chest normal, lungs clear to auscultation , no respiratory distress  CV:  Palpation and auscultation of heart done , regular rate and rhythm, no murmur, rub, or gallop  ABDOMEN:  normal bowel sounds, soft, nontender, no hepatosplenomegaly or other masses  M/S:   Gait and station normal  Digits and nails normal  SKIN:  open areas bilateral LE  NEURO:   Cranial nerves 2-12 are " normal tested and grossly at patient's baseline  PSYCH:  oriented X 3, affect and mood normal    Labs done in SNF are in [facility]. Please refer to them using [employer]/Care Everywhere. and Recent labs in EPIC reviewed by me today.     Assessment/Plan:   Diagnosis Comments   1. Moderate protein malnutrition (H)  Body mass index is 24.02 kg/m .  Gradual weight loss, without recent weight since 2020 per chart review.   -Obtain monthly weights    2. Myelodysplastic syndrome (H)  Per history. Monitor    3. Coronary artery disease of native heart with stable angina pectoris, unspecified vessel or lesion type (H)  Chronic, h/o MI. Last EF 45%  -Remains on ASA   -Was on lisinopril in past and unable to recall why it was discontinued. Refused new medications at this time.        Orders:  1. Monthly weights     Electronically signed by:   Alix Tarango NP

## 2022-07-06 NOTE — LETTER
"    7/6/2022        RE: Ben Salvador  C/o Torin Salvador  [address] [address]        [facility]    Chief Complaint   Patient presents with     [facility] Care Coordination - Regulatory     HPI:  Ben Salvador is a 89 year old  (1/14/1928), who is being seen today for an episodic care visit at: [facility] ([facility]) [[id number]]. Today's concern is: Patient resting in wheelchair in room, spouse at side. Reports feeling well today and is without concern. Eating and sleeping ok, no difficulties with constipation. Mood is stable and feels he is not depressed at this time. Working with wound care and unable to graduate 2/2 new wounds, some concerned noted. Denies CP, SOB or lightheadedness.     BP Readings from Last 3 Encounters:   04/07/22 132/62   02/09/21 (!) 166/75   02/02/21 (!) 166/75     Pulse Readings from Last 4 Encounters:   04/07/22 (!) 37   02/09/21 56   02/02/21 56   10/21/20 66     Wt Readings from Last 4 Encounters:   07/06/22 50.3 kg (111 lb)   02/22/22 50.3 kg (111 lb)   02/16/22 50.3 kg (111 lb)   01/19/22 50.3 kg (111 lb)       Allergies, and PMH/PSH reviewed in [facility] today.  REVIEW OF SYSTEMS:  10 point ROS of systems including Constitutional, Eyes, Respiratory, Cardiovascular, Gastroenterology, Genitourinary, Integumentary, Musculoskeletal, Psychiatric were all negative except for pertinent positives noted in my HPI.    Objective:   Temp (!) 90.6  F (32.6  C)   Ht 1.448 m (4' 9\")   Wt 50.3 kg (111 lb)   BMI 24.02 kg/m    GENERAL APPEARANCE:  Alert, in no distress  ENT:  Mouth and posterior oropharynx normal, moist mucous membranes, normal hearing acuity  RESP:  respiratory effort and palpation of chest normal, lungs clear to auscultation , no respiratory distress  CV:  Palpation and auscultation of heart done , regular rate and rhythm, no murmur, rub, or gallop  ABDOMEN:  normal bowel sounds, soft, nontender, no hepatosplenomegaly or other masses  M/S:   Gait and " station normal  Digits and nails normal  SKIN:  open areas bilateral LE  NEURO:   Cranial nerves 2-12 are normal tested and grossly at patient's baseline  PSYCH:  oriented X 3, affect and mood normal    Labs done in SNF are in [facility]. Please refer to them using EPIC/Care Everywhere. and Recent labs in EPIC reviewed by me today.     Assessment/Plan:   Diagnosis Comments   1. Moderate protein malnutrition (H)  Body mass index is 24.02 kg/m .  Gradual weight loss, without recent weight since 2020 per chart review.   -Obtain monthly weights    2. Myelodysplastic syndrome (H)  Per history. Monitor    3. Coronary artery disease of native heart with stable angina pectoris, unspecified vessel or lesion type (H)  Chronic, h/o MI. Last EF 45%  -Remains on ASA   -Was on lisinopril in past and unable to recall why it was discontinued. Refused new medications at this time.        Orders:  1. Monthly weights     Electronically signed by:   Alix Tarango NP            Sincerely,        Alix Tarango NP

## 2022-07-08 ENCOUNTER — TELEPHONE (OUTPATIENT)
Dept: GERIATRICS | Facility: CLINIC | Age: 87
End: 2022-07-08

## 2022-07-08 NOTE — TELEPHONE ENCOUNTER
[facility] GERIATRIC SERVICES NON-EMERGENT  ENCOUNTER    Ben Salvador is a 89 year old  (1/14/1928), phone call received today regarding: Shirin with [facility]     1) SN to continue 2 wk 8 for wound cares  2) Lantiseptic to leg. Cover wound with Xeroform, ABD and rolled gauze.      Electronically signed by:   Brea Almeida RN

## 2022-07-10 PROBLEM — E44.0 MODERATE PROTEIN MALNUTRITION (H): Status: ACTIVE | Noted: 2022-07-10

## 2022-08-10 ENCOUNTER — ASSISTED LIVING VISIT (OUTPATIENT)
Dept: GERIATRICS | Facility: CLINIC | Age: 87
End: 2022-08-10
Payer: MEDICARE

## 2022-08-10 VITALS
RESPIRATION RATE: 18 BRPM | WEIGHT: 111 LBS | HEIGHT: 60 IN | SYSTOLIC BLOOD PRESSURE: 132 MMHG | BODY MASS INDEX: 21.79 KG/M2 | OXYGEN SATURATION: 96 % | DIASTOLIC BLOOD PRESSURE: 62 MMHG

## 2022-08-10 DIAGNOSIS — S81.802D OPEN WOUND OF LEFT LOWER LEG, SUBSEQUENT ENCOUNTER: Primary | ICD-10-CM

## 2022-08-10 NOTE — PROGRESS NOTES
[facility] GERIATRICS  ACUTE/EPISODIC VISIT    [facility] Medical Record Number:  [id number]  Place of Service where encounter took place:  [facility] ([facility]) [[id number]]    Chief Complaint   Patient presents with     RECHECK       HPI:    Ben Salvador is a 89 year old  (1/14/1928), who is being seen today for an episodic care visit.  HPI information obtained from: facility chart records, facility staff and patient report.    Today's concern is:  Patient has chronic wound left lower extremity. Followed by wound care. Today, reports his leg is more sore, red and warm to touch. Sone drainage noted. Wrapped with dressing. Has had similar issue with past wound and complications of cellulitis. Improved with use of keflex; requesting abx at this time. No further indications of acute illness.     BP Readings from Last 3 Encounters:   08/10/22 132/62   04/07/22 132/62   02/09/21 (!) 166/75     Pulse Readings from Last 4 Encounters:   04/07/22 (!) 37   02/09/21 56   02/02/21 56   10/21/20 66     Wt Readings from Last 4 Encounters:   08/10/22 50.3 kg (111 lb)   07/06/22 50.3 kg (111 lb)   02/22/22 50.3 kg (111 lb)   02/16/22 50.3 kg (111 lb)       ALLERGIES:  No Known Allergies     MEDICATIONS:  Post Discharge Medication Reconciliation Status: patient was not discharged from an inpatient facility or TCU.     Current Outpatient Medications   Medication Sig Dispense Refill     ACE/ARB/ARNI NOT PRESCRIBED (INTENTIONAL) Please choose reason not prescribed from choices below.       acetaminophen (TYLENOL) 325 MG tablet TAKE TWO TABLETS (650 MG) BY MOUTH EVERY 6 HOURS AS NEEDED FOR PAIN 60 tablet 97     allopurinol (ZYLOPRIM) 100 MG tablet Take 1 tablet (100 mg) by mouth in the morning. 90 tablet 3     aspirin (ASPIRIN LOW DOSE) 81 MG chewable tablet CHEW AND SWALLOW ONE TABLET BY MOUTH ONCE DAILY 90 tablet 3     atorvastatin (LIPITOR) 40 MG tablet Take 1 tablet (40 mg) by mouth in the morning. 90 tablet 3      benzocaine (ANBESOL) 20 % LIQD liquid Apply small amount to mouth sore prior to meals and at bedtime 30 mL 3     carbidopa-levodopa (SINEMET)  MG tablet TAKE 1 TABLET BY MOUTH TWICE DAILY IN THE MORNING & IN THE EVENING 180 tablet 3     citalopram (CELEXA) 20 MG tablet TAKE 1 TABLET BY MOUTH ONCE DAILY 90 tablet 3     cyanocobalamin (VITAMIN B-12) 500 MCG tablet Take 1 tablet (500 mcg) by mouth daily 90 tablet 3     Eucerin external lotion APPLY TOPICALLY TO BILATERAL LOWER EXTREMITIES AS DIRECTED TWICE DAILY 500 mL 97     mineral oil-white petrolatum (EUCERIN) CREA cream APPLY TOPICALLY TO BILATERAL LOWER EXTEMITIES AT AT BEDTIME OR AS NEEDED 454 g 97     mirtazapine (REMERON) 15 MG tablet TAKE 1 TABLET BY MOUTH AT BEDTIME 31 tablet 11     Multiple Vitamins-Minerals (CEROVITE SENIOR) TABS Take 1 tablet by mouth in the morning. 90 tablet 3     nitroGLYcerin (NITROSTAT) 0.4 MG sublingual tablet PLACE 1 TABLET UNDER TONGUE AT ONSET OF CHEST PAIN. MAY REPEAT EVERY 5 MINUTES AS NEEDED X 3 DOSES 25 tablet 97     traZODone (DESYREL) 100 MG tablet TAKE 1 TABLET BY MOUTH AT BEDTIME 31 tablet 11     triamcinolone (KENALOG) 0.1 % external ointment APPLY TOPICALLY TO LOWER EXTREMITY RASH TWICE DAILY AS NEEDED 80 g 97     Vitamin D3 (CHOLECALCIFEROL) 25 mcg (1000 units) tablet Take 2 tablets (50 mcg) by mouth in the morning. 180 tablet 3     Medications reviewed:  Medications reconciled to facility chart and changes were made to reflect current medications as identified as above med list. Below are the changes that were made:   Medications stopped since last EPIC medication reconciliation:   There are no discontinued medications.    Medications started since last [facility] medication reconciliation:  No orders of the defined types were placed in this encounter.        REVIEW OF SYSTEMS:  4 point ROS neg other than the symptoms noted above in the HPI.  Review of Systems   Musculoskeletal:        Left lower extremity pain    "      PHYSICAL EXAM:  /62   Resp 18   Ht 1.448 m (4' 9\")   Wt 50.3 kg (111 lb)   SpO2 96%   BMI 24.02 kg/m    Physical Exam  Vitals and nursing note reviewed.   Cardiovascular:      Rate and Rhythm: Normal rate and regular rhythm.      Pulses: Normal pulses.   Abdominal:      General: Abdomen is flat.      Palpations: Abdomen is soft.   Skin:            Comments: Wound present. Erythema, drainage and warmth to touch.      Neurological:      Mental Status: He is alert.   Psychiatric:         Attention and Perception: Attention normal.           ASSESSMENT / PLAN:  (K42.203S) Open wound of left lower leg, subsequent encounter  (primary encounter diagnosis)  Comment: Chronic wound, followed by wound RN. Now presenting with increased redness, warmth and drainage. S/sx constituent with cellulitis.   Plan:   -Keflex 500 mg PO BID x 7 days.       Orders:  Keflex 500 mg PO BID x7 days     Electronically signed by  Alix Tarango NP          "

## 2022-08-10 NOTE — LETTER
8/10/2022        RE: Ben Salvador  C/o Torin Salvador  [address] [address]        [facility] GERIATRICS  ACUTE/EPISODIC VISIT    [facility] Medical Record Number:  [id number]  [facility] [[id number]]    Chief Complaint   Patient presents with     RECHECK       HPI:    Ben Salvador is a 89 year old  (1/14/1928), who is being seen today for an episodic care visit.  HPI information obtained from: facility chart records, facility staff and patient report.    Today's concern is:  Patient has chronic wound left lower extremity. Followed by wound care. Today, reports his leg is more sore, red and warm to touch. Sone drainage noted. Wrapped with dressing. Has had similar issue with past wound and complications of cellulitis. Improved with use of keflex; requesting abx at this time. No further indications of acute illness.     BP Readings from Last 3 Encounters:   08/10/22 132/62   04/07/22 132/62   02/09/21 (!) 166/75     Pulse Readings from Last 4 Encounters:   04/07/22 (!) 37   02/09/21 56   02/02/21 56   10/21/20 66     Wt Readings from Last 4 Encounters:   08/10/22 50.3 kg (111 lb)   07/06/22 50.3 kg (111 lb)   02/22/22 50.3 kg (111 lb)   02/16/22 50.3 kg (111 lb)       ALLERGIES:  No Known Allergies     MEDICATIONS:  Post Discharge Medication Reconciliation Status: patient was not discharged from an inpatient facility or TCU.     Current Outpatient Medications   Medication Sig Dispense Refill     ACE/ARB/ARNI NOT PRESCRIBED (INTENTIONAL) Please choose reason not prescribed from choices below.       acetaminophen (TYLENOL) 325 MG tablet TAKE TWO TABLETS (650 MG) BY MOUTH EVERY 6 HOURS AS NEEDED FOR PAIN 60 tablet 97     allopurinol (ZYLOPRIM) 100 MG tablet Take 1 tablet (100 mg) by mouth in the morning. 90 tablet 3     aspirin (ASPIRIN LOW DOSE) 81 MG chewable tablet CHEW AND SWALLOW ONE TABLET BY MOUTH ONCE DAILY 90  tablet 3     atorvastatin (LIPITOR) 40 MG tablet Take 1 tablet (40 mg) by mouth in the morning. 90 tablet 3     benzocaine (ANBESOL) 20 % LIQD liquid Apply small amount to mouth sore prior to meals and at bedtime 30 mL 3     carbidopa-levodopa (SINEMET)  MG tablet TAKE 1 TABLET BY MOUTH TWICE DAILY IN THE MORNING & IN THE EVENING 180 tablet 3     citalopram (CELEXA) 20 MG tablet TAKE 1 TABLET BY MOUTH ONCE DAILY 90 tablet 3     cyanocobalamin (VITAMIN B-12) 500 MCG tablet Take 1 tablet (500 mcg) by mouth daily 90 tablet 3     Eucerin external lotion APPLY TOPICALLY TO BILATERAL LOWER EXTREMITIES AS DIRECTED TWICE DAILY 500 mL 97     mineral oil-white petrolatum (EUCERIN) CREA cream APPLY TOPICALLY TO BILATERAL LOWER EXTEMITIES AT AT BEDTIME OR AS NEEDED 454 g 97     mirtazapine (REMERON) 15 MG tablet TAKE 1 TABLET BY MOUTH AT BEDTIME 31 tablet 11     Multiple Vitamins-Minerals (CEROVITE SENIOR) TABS Take 1 tablet by mouth in the morning. 90 tablet 3     nitroGLYcerin (NITROSTAT) 0.4 MG sublingual tablet PLACE 1 TABLET UNDER TONGUE AT ONSET OF CHEST PAIN. MAY REPEAT EVERY 5 MINUTES AS NEEDED X 3 DOSES 25 tablet 97     traZODone (DESYREL) 100 MG tablet TAKE 1 TABLET BY MOUTH AT BEDTIME 31 tablet 11     triamcinolone (KENALOG) 0.1 % external ointment APPLY TOPICALLY TO LOWER EXTREMITY RASH TWICE DAILY AS NEEDED 80 g 97     Vitamin D3 (CHOLECALCIFEROL) 25 mcg (1000 units) tablet Take 2 tablets (50 mcg) by mouth in the morning. 180 tablet 3     Medications reviewed:  Medications reconciled to facility chart and changes were made to reflect current medications as identified as above med list. Below are the changes that were made:   Medications stopped since last EPIC medication reconciliation:   There are no discontinued medications.    Medications started since last [facility] medication reconciliation:  No orders of the defined types were placed in this encounter.        REVIEW OF SYSTEMS:  4 point ROS neg other than  "the symptoms noted above in the HPI.  Review of Systems   Musculoskeletal:        Left lower extremity pain         PHYSICAL EXAM:  /62   Resp 18   Ht 1.448 m (4' 9\")   Wt 50.3 kg (111 lb)   SpO2 96%   BMI 24.02 kg/m    Physical Exam  Vitals and nursing note reviewed.   Cardiovascular:      Rate and Rhythm: Normal rate and regular rhythm.      Pulses: Normal pulses.   Abdominal:      General: Abdomen is flat.      Palpations: Abdomen is soft.   Skin:            Comments: Wound present. Erythema, drainage and warmth to touch.      Neurological:      Mental Status: He is alert.   Psychiatric:         Attention and Perception: Attention normal.           ASSESSMENT / PLAN:  (H84.370F) Open wound of left lower leg, subsequent encounter  (primary encounter diagnosis)  Comment: Chronic wound, followed by wound RN. Now presenting with increased redness, warmth and drainage. S/sx constituent with cellulitis.   Plan:   -Keflex 500 mg PO BID x 7 days.       Orders:  Keflex 500 mg PO BID x7 days     Electronically signed by  Alix Tarango NP                Sincerely,        Alix Tarango NP    "

## 2022-08-23 ENCOUNTER — TELEPHONE (OUTPATIENT)
Dept: GERIATRICS | Facility: CLINIC | Age: 87
End: 2022-08-23

## 2022-08-23 NOTE — TELEPHONE ENCOUNTER
[facility] NON-EMERGENT VOICEMAIL ENCOUNTER    Ben Salvador is a 89 year old  (1/14/1928), Voicemail Message received today regarding:   Ofe PLATA updating provider about wound care changes.    Disposition    Pt with wound on LLE that is not healing. Was assessed by [facility] and recommended changing wound care treatment to include ammonium lactate.    Requests    PCP updated of change in wound care treatment.      Electronically signed by:   Tereza Villanueva RN

## 2022-08-31 ENCOUNTER — ASSISTED LIVING VISIT (OUTPATIENT)
Dept: GERIATRICS | Facility: CLINIC | Age: 87
End: 2022-08-31
Payer: MEDICARE

## 2022-08-31 VITALS
SYSTOLIC BLOOD PRESSURE: 129 MMHG | HEIGHT: 60 IN | HEART RATE: 62 BPM | WEIGHT: 111 LBS | TEMPERATURE: 97.4 F | OXYGEN SATURATION: 97 % | DIASTOLIC BLOOD PRESSURE: 88 MMHG | BODY MASS INDEX: 21.79 KG/M2 | RESPIRATION RATE: 18 BRPM

## 2022-08-31 DIAGNOSIS — N18.32 STAGE 3B CHRONIC KIDNEY DISEASE (H): ICD-10-CM

## 2022-08-31 DIAGNOSIS — F41.1 GENERALIZED ANXIETY DISORDER: ICD-10-CM

## 2022-08-31 DIAGNOSIS — I25.9 CHRONIC ISCHEMIC HEART DISEASE: ICD-10-CM

## 2022-08-31 DIAGNOSIS — R63.4 WEIGHT LOSS: ICD-10-CM

## 2022-08-31 DIAGNOSIS — I10 ESSENTIAL HYPERTENSION: ICD-10-CM

## 2022-08-31 DIAGNOSIS — S81.802D OPEN WOUND OF LEFT LOWER LEG, SUBSEQUENT ENCOUNTER: Primary | ICD-10-CM

## 2022-08-31 DIAGNOSIS — E53.8 VITAMIN B12 DEFICIENCY: ICD-10-CM

## 2022-08-31 DIAGNOSIS — E44.0 MODERATE PROTEIN MALNUTRITION (H): ICD-10-CM

## 2022-08-31 DIAGNOSIS — G20.C PARKINSONISM, UNSPECIFIED PARKINSONISM TYPE (H): ICD-10-CM

## 2022-08-31 NOTE — PROGRESS NOTES
"Ben Salvador is a 89 year old male seen August 31, 2022  at [facility] where he has resided for 2 and a half years (admit 2/2020) seen to follow up anxiety and Parkinsonism.   Pt is seen in his apartment, up to  with his wife present.   \"My teeth fell out\"    Refuses to leave the building to see a dentist.  Does not like the food at AL, and has a poor appetite worsened by loss of his teeth, is chronically underweight     U/S ordered last week by [facility] for LE edema and non-healing wound.  Negative for DVT    Pt reports he elevates his LLE tid.  States his wound is healing but does not allow exam as [facility] nurse was in to change dressing this morning.             By chart review, patient had a [facility] admission in December 2019 for recurrent falls and worsened anxiety.   He had been taking lorazepam at home for the anxiety, worsening the falls, and this was tapered off.  He transitioned to [facility] where his anxiety, panic attacks and fear of falling impaired his ability to progress with therapies.    Mirtazapine started and he was followed by the Psychologist.  By the end of his month-long stay he was able to ambulate 300' with FWW and min assist, independent with transfers.   Since AL admission he has not tried walking and has declined [facility] PHYSICAL THERAPY / OCCUPATIONAL THERAPY, retains disabling fear of falling.             Pt has been primary caregiver for his wife Mariela.   After hospitalization their family determined they needed more support and have moved them to [place] for permanent placement.       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  They previously lived in their home of 66 years, in [place].  They have a son Torin and daughter " "Dixie.     Non smoker    ROS:  Tinetti 18/28    SLUMS 25/30  Continued poor appetite and weight loss; weight 111 lbs.      EXAM: up to WC, NAD  /88   Pulse 62   Temp 97.4  F (36.3  C)   Resp 18   Ht 1.448 m (4' 9\")   Wt 50.3 kg (111 lb)   SpO2 97%   BMI 24.02 kg/m     Missing several teeth    Neck supple without adenopathy  Lungs clear bilaterally with fair air movement  Heart RRR s1s2  Abd soft, NT, no distention, +BS  Ext without edema  Neuro: accurate historian, LE weakness  Psych: affect okay    Labs reviewed:  Cr 1.9 in Feb 2021    hgb 10.6        IMP/PLAN:   (S81.282D) Open wound of left lower leg, subsequent encounter   Comment: likely stasis ulcer    Plan: continue current wound care per [facility] nursing   Elevation as he is doing.    Monitor for infection       (G20) Parkinsonism, unspecified Parkinsonism type (H)    Comment: stable on current regimen    Plan: Sinemet 10/100 bid.      (F41.1) Generalized anxiety disorder   (F41.0) Panic attack  (F33.1) Moderate episode of recurrent major depressive disorder (H)  Comment: isolative; he and Mariela do not leave their apartment.  Plan: mirtazapine 15 mg/HS, trazodone 100mg/HS and citalopram 20 mg/day     Would look to GDR of trazodone in this frail elderly man.           (I10) Essential hypertension  (N18.32) Stage 3b chronic kidney disease  Comment: GFR 33, good bp control    Plan: no Rx currently  Recheck BMP next lab day   Renal dosing of all medications     (I25.9) Chronic ischemic heart disease  Comment: h/o MI, last EF 45% with inferior and inferolateral hypokinesis   Plan: daily ASA and atorvastatin 40 mg/day for secondary prevention.  Given low intake, could decrease atorvastatin to 20 mg/day         (E44.0) Moderate protein malnutrition (H)  (R63.4) Weight loss  Comment: preceded admission and has continued; thin and frail  Plan: continue mirtazapine at current dose, with hopes to boost appetite.    Nutritional supplements prn  Reviewed with " pt today, importance of good nutrition to aid in wound healing.      (M62.81) Generalized muscle weakness  Comment: not ambulatory and needs assist for all ADLs     Plan: he has declined [facility] PHYSICAL THERAPY     Continue vit D 25 mcg/day, dietary calcium for bone health     (D51.9) Anemia due to vitamin B12 deficiency, unspecified B12 deficiency type  Comment: macrocytic anemia   Likely also a nutritional component     Plan: continue B12 replacement  Recheck CBC      Yessenia Coffman MD

## 2022-08-31 NOTE — LETTER
"    8/31/2022        RE: Ben Salvador  C/o Torin Salvador  [address] [address]        Ben Salvador is a 89 year old male seen August 31, 2022  at [facility] where he has resided for 2 and a half years (admit 2/2020) seen to follow up anxiety and Parkinsonism.   Pt is seen in his apartment, up to  with his wife present.   \"My teeth fell out\"    Refuses to leave the building to see a dentist.  Does not like the food at AL, and has a poor appetite worsened by loss of his teeth, is chronically underweight     U/S ordered last week by [facility] for LE edema and non-healing wound.  Negative for DVT    Pt reports he elevates his LLE tid.  States his wound is healing but does not allow exam as [facility] nurse was in to change dressing this morning.             By chart review, patient had a [facility] admission in December 2019 for recurrent falls and worsened anxiety.   He had been taking lorazepam at home for the anxiety, worsening the falls, and this was tapered off.  He transitioned to [facility] where his anxiety, panic attacks and fear of falling impaired his ability to progress with therapies.    Mirtazapine started and he was followed by the Psychologist.  By the end of his month-long stay he was able to ambulate 300' with FWW and min assist, independent with transfers.   Since AL admission he has not tried walking and has declined [facility] PHYSICAL THERAPY / OCCUPATIONAL THERAPY, retains disabling fear of falling.             Pt has been primary caregiver for his wife Mariela.   After hospitalization their family determined they needed more support and have moved them to [place] for permanent placement.       PMH:  CAD, s/p MI and PTCAx2, 2003  HTN with CKD stage 3  Cataracts  PA /B12 deficiency, 2004  Probable MDS, 2006  Asthma, 2004  Gallstone pancreatitis, 2017  Anxiety / depression  Panic attacks  Gout  Recurrent falls  BPH    SH:  Lives with his wife Mariela in [place] apartment with " "services.  They previously lived in their home of 66 years, in [place].  They have a son Torin and daughter Dixei.     Non smoker    ROS:  Tinetti 18/28    SLUMS 25/30  Continued poor appetite and weight loss; weight 111 lbs.      EXAM: up to WC, NAD  /88   Pulse 62   Temp 97.4  F (36.3  C)   Resp 18   Ht 1.448 m (4' 9\")   Wt 50.3 kg (111 lb)   SpO2 97%   BMI 24.02 kg/m     Missing several teeth    Neck supple without adenopathy  Lungs clear bilaterally with fair air movement  Heart RRR s1s2  Abd soft, NT, no distention, +BS  Ext without edema  Neuro: accurate historian, LE weakness  Psych: affect okay    Labs reviewed:  Cr 1.9 in Feb 2021    hgb 10.6        IMP/PLAN:   (S81.802D) Open wound of left lower leg, subsequent encounter   Comment: likely stasis ulcer    Plan: continue current wound care per [facility] nursing   Elevation as he is doing.    Monitor for infection       (G20) Parkinsonism, unspecified Parkinsonism type (H)    Comment: stable on current regimen    Plan: Sinemet 10/100 bid.      (F41.1) Generalized anxiety disorder   (F41.0) Panic attack  (F33.1) Moderate episode of recurrent major depressive disorder (H)  Comment: isolative; he and Mariela do not leave their apartment.  Plan: mirtazapine 15 mg/HS, trazodone 100mg/HS and citalopram 20 mg/day     Would look to GDR of trazodone in this frail elderly man.           (I10) Essential hypertension  (N18.32) Stage 3b chronic kidney disease  Comment: GFR 33, good bp control    Plan: no Rx currently  Recheck BMP next lab day   Renal dosing of all medications     (I25.9) Chronic ischemic heart disease  Comment: h/o MI, last EF 45% with inferior and inferolateral hypokinesis   Plan: daily ASA and atorvastatin 40 mg/day for secondary prevention.  Given low intake, could decrease atorvastatin to 20 mg/day         (E44.0) Moderate protein malnutrition (H)  (R63.4) Weight loss  Comment: preceded admission and has continued; thin and frail  Plan: continue " mirtazapine at current dose, with hopes to boost appetite.    Nutritional supplements prn  Reviewed with pt today, importance of good nutrition to aid in wound healing.      (M62.81) Generalized muscle weakness  Comment: not ambulatory and needs assist for all ADLs     Plan: he has declined [facility] PHYSICAL THERAPY     Continue vit D 25 mcg/day, dietary calcium for bone health     (D51.9) Anemia due to vitamin B12 deficiency, unspecified B12 deficiency type  Comment: macrocytic anemia   Likely also a nutritional component     Plan: continue B12 replacement  Recheck CBC      Yessenia Coffman MD         Sincerely,        Yessenia Coffman MD

## 2022-09-09 ENCOUNTER — ASSISTED LIVING VISIT (OUTPATIENT)
Dept: GERIATRICS | Facility: CLINIC | Age: 87
End: 2022-09-09
Payer: COMMERCIAL

## 2022-09-09 VITALS
HEIGHT: 60 IN | SYSTOLIC BLOOD PRESSURE: 129 MMHG | WEIGHT: 111 LBS | RESPIRATION RATE: 18 BRPM | OXYGEN SATURATION: 96 % | HEART RATE: 62 BPM | TEMPERATURE: 97.6 F | BODY MASS INDEX: 21.79 KG/M2 | DIASTOLIC BLOOD PRESSURE: 88 MMHG

## 2022-09-09 DIAGNOSIS — Z53.9 ERRONEOUS ENCOUNTER--DISREGARD: Primary | ICD-10-CM

## 2022-09-09 NOTE — LETTER
9/9/2022        RE: Ben Salvador  C/o Torin Salvador  [address]  [facility] [address]          This encounter was opened in error. Please disregard.        Sincerely,        Alix Tarango, NP

## 2022-09-11 ENCOUNTER — LAB REQUISITION (OUTPATIENT)
Dept: LAB | Facility: CLINIC | Age: 87
End: 2022-09-11
Payer: MEDICARE

## 2022-09-11 DIAGNOSIS — N18.30 CHRONIC KIDNEY DISEASE, STAGE 3 UNSPECIFIED (H): ICD-10-CM

## 2022-09-11 DIAGNOSIS — I10 ESSENTIAL (PRIMARY) HYPERTENSION: ICD-10-CM

## 2022-09-13 LAB
ANION GAP SERPL CALCULATED.3IONS-SCNC: 10 MMOL/L (ref 7–15)
BUN SERPL-MCNC: 49.3 MG/DL (ref 8–23)
CALCIUM SERPL-MCNC: 9.3 MG/DL (ref 8.2–9.6)
CHLORIDE SERPL-SCNC: 106 MMOL/L (ref 98–107)
CREAT SERPL-MCNC: 1.79 MG/DL (ref 0.67–1.17)
DEPRECATED HCO3 PLAS-SCNC: 26 MMOL/L (ref 22–29)
ERYTHROCYTE [DISTWIDTH] IN BLOOD BY AUTOMATED COUNT: 14.9 % (ref 10–15)
GFR SERPL CREATININE-BSD FRML MDRD: 35 ML/MIN/1.73M2
GLUCOSE SERPL-MCNC: 141 MG/DL (ref 70–99)
HCT VFR BLD AUTO: 33.3 % (ref 40–53)
HGB BLD-MCNC: 10.3 G/DL (ref 13.3–17.7)
MCH RBC QN AUTO: 30.4 PG (ref 26.5–33)
MCHC RBC AUTO-ENTMCNC: 30.9 G/DL (ref 31.5–36.5)
MCV RBC AUTO: 98 FL (ref 78–100)
PLATELET # BLD AUTO: 183 10E3/UL (ref 150–450)
POTASSIUM SERPL-SCNC: 5.1 MMOL/L (ref 3.4–5.3)
RBC # BLD AUTO: 3.39 10E6/UL (ref 4.4–5.9)
SODIUM SERPL-SCNC: 142 MMOL/L (ref 136–145)
WBC # BLD AUTO: 7.9 10E3/UL (ref 4–11)

## 2022-09-13 PROCEDURE — 85027 COMPLETE CBC AUTOMATED: CPT | Mod: ORL | Performed by: INTERNAL MEDICINE

## 2022-09-13 PROCEDURE — P9603 ONE-WAY ALLOW PRORATED MILES: HCPCS | Mod: ORL | Performed by: INTERNAL MEDICINE

## 2022-09-13 PROCEDURE — 80048 BASIC METABOLIC PNL TOTAL CA: CPT | Mod: ORL | Performed by: INTERNAL MEDICINE

## 2022-09-13 PROCEDURE — 36415 COLL VENOUS BLD VENIPUNCTURE: CPT | Mod: ORL | Performed by: INTERNAL MEDICINE

## 2022-09-14 ENCOUNTER — ASSISTED LIVING VISIT (OUTPATIENT)
Dept: GERIATRICS | Facility: CLINIC | Age: 87
End: 2022-09-14
Payer: MEDICARE

## 2022-09-14 VITALS
DIASTOLIC BLOOD PRESSURE: 88 MMHG | SYSTOLIC BLOOD PRESSURE: 129 MMHG | WEIGHT: 111 LBS | TEMPERATURE: 97.7 F | HEART RATE: 62 BPM | OXYGEN SATURATION: 97 % | BODY MASS INDEX: 21.79 KG/M2 | RESPIRATION RATE: 18 BRPM | HEIGHT: 60 IN

## 2022-09-14 DIAGNOSIS — E53.8 VITAMIN B12 DEFICIENCY (NON ANEMIC): ICD-10-CM

## 2022-09-14 DIAGNOSIS — D51.0 PERNICIOUS ANEMIA: Primary | ICD-10-CM

## 2022-09-14 NOTE — LETTER
"    9/14/2022        RE: Ben Salvador  C/o Torin Salvador  [address] [address]        [facility] GERIATRICS    Chief Complaint   Patient presents with     RECHECK     HPI:  Ben Salvador is a 89 year old  (1/14/1928), who is being seen today for an episodic care visit at: [facility] ([facility]) [[id number]]. Today's concern is:   Patient recently seen for visit and routine labs were done. Patient continues to have baseline low hemoglobin, hematocrit and elevated MCV consistent with pernicious anemia. Currently taking vitamin B12 without recent blood levels taken. Remains asymptomatic.     BP Readings from Last 3 Encounters:   09/14/22 129/88   08/30/22 129/88   08/31/22 129/88     Pulse Readings from Last 4 Encounters:   09/14/22 62   08/30/22 62   08/31/22 62   04/07/22 (!) 37         Allergies, and PMH/PSH reviewed in EPIC today.  REVIEW OF SYSTEMS:  10 point ROS of systems including Constitutional, Eyes, Respiratory, Cardiovascular, Gastroenterology, Genitourinary, Integumentary, Musculoskeletal, Psychiatric were all negative except for pertinent positives noted in my HPI.    Objective:   /88   Pulse 62   Temp 97.7  F (36.5  C)   Resp 18   Ht 1.448 m (4' 9\")   Wt 50.3 kg (111 lb)   SpO2 97%   BMI 24.02 kg/m    GENERAL APPEARANCE:  Alert, in no distress  RESP:  respiratory effort and palpation of chest normal, lungs clear to auscultation   CV:  Palpation and auscultation of heart done , regular rate and rhythm, no murmur, rub, or gallop, peripheral edema 1+ in left lower extremity   ABDOMEN:  normal bowel sounds, soft, nontender, no hepatosplenomegaly or other masses  M/S:   Gait and station normal  Digits and nails normal  SKIN:  Inspection of skin and subcutaneous tissue baseline, Palpation of skin and subcutaneous tissue baseline  NEURO:   Cranial nerves 2-12 are normal tested and grossly at patient's baseline  PSYCH:  oriented X 3    Labs done in SNF are in [facility] " EPIC. Please refer to them using EPIC/Care Everywhere. and Recent labs in EPIC reviewed by me today.     Assessment/Plan:    ICD-10-CM    1. Pernicious anemia  D51.0    2. 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     Continue with current POC without change at this time.         Post Medication Reconciliation Status: Patient was not discharged from an inpatient facility or TCU        Orders:  1. Vitamin B12 level next lab day     Electronically signed by:   Alix Tarango NP            Sincerely,        Alix Tarango NP

## 2022-09-14 NOTE — PROGRESS NOTES
"[facility] GERIATRICS    Chief Complaint   Patient presents with     RECHECK     HPI:  Ben Salvador is a 89 year old  (1/14/1928), who is being seen today for an episodic care visit at: [facility]) [[id number]]. Today's concern is:   Patient recently seen for visit and routine labs were done. Patient continues to have baseline low hemoglobin, hematocrit and elevated MCV consistent with pernicious anemia. Currently taking vitamin B12 without recent blood levels taken. Remains asymptomatic.     BP Readings from Last 3 Encounters:   09/14/22 129/88   08/30/22 129/88   08/31/22 129/88     Pulse Readings from Last 4 Encounters:   09/14/22 62   08/30/22 62   08/31/22 62   04/07/22 (!) 37         Allergies, and PMH/PSH reviewed in [employer] today.  REVIEW OF SYSTEMS:  10 point ROS of systems including Constitutional, Eyes, Respiratory, Cardiovascular, Gastroenterology, Genitourinary, Integumentary, Musculoskeletal, Psychiatric were all negative except for pertinent positives noted in my HPI.    Objective:   /88   Pulse 62   Temp 97.7  F (36.5  C)   Resp 18   Ht 1.448 m (4' 9\")   Wt 50.3 kg (111 lb)   SpO2 97%   BMI 24.02 kg/m    GENERAL APPEARANCE:  Alert, in no distress  RESP:  respiratory effort and palpation of chest normal, lungs clear to auscultation   CV:  Palpation and auscultation of heart done , regular rate and rhythm, no murmur, rub, or gallop, peripheral edema 1+ in left lower extremity   ABDOMEN:  normal bowel sounds, soft, nontender, no hepatosplenomegaly or other masses  M/S:   Gait and station normal  Digits and nails normal  SKIN:  Inspection of skin and subcutaneous tissue baseline, Palpation of skin and subcutaneous tissue baseline  NEURO:   Cranial nerves 2-12 are normal tested and grossly at patient's baseline  PSYCH:  oriented X 3    Labs done in SNF are in [facility]. Please refer to them using [employer]/Care Everywhere. and Recent labs in [facility] reviewed by me today. "     Assessment/Plan:    ICD-10-CM    1. Pernicious anemia  D51.0    2. 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     Continue with current POC without change at this time.         Post Medication Reconciliation Status: Patient was not discharged from an inpatient facility or TCU        Orders:  1. Vitamin B12 level next lab day     Electronically signed by:   Alix Tarango NP

## 2022-09-18 ENCOUNTER — HEALTH MAINTENANCE LETTER (OUTPATIENT)
Age: 87
End: 2022-09-18

## 2022-09-18 ENCOUNTER — LAB REQUISITION (OUTPATIENT)
Dept: LAB | Facility: CLINIC | Age: 87
End: 2022-09-18
Payer: MEDICARE

## 2022-09-18 DIAGNOSIS — Z81.0 FAMILY HISTORY OF INTELLECTUAL DISABILITIES: ICD-10-CM

## 2022-09-20 PROCEDURE — 82607 VITAMIN B-12: CPT | Mod: ORL | Performed by: NURSE PRACTITIONER

## 2022-09-20 PROCEDURE — P9603 ONE-WAY ALLOW PRORATED MILES: HCPCS | Mod: ORL | Performed by: NURSE PRACTITIONER

## 2022-09-20 PROCEDURE — 36415 COLL VENOUS BLD VENIPUNCTURE: CPT | Mod: ORL | Performed by: NURSE PRACTITIONER

## 2022-09-21 ENCOUNTER — ASSISTED LIVING VISIT (OUTPATIENT)
Dept: GERIATRICS | Facility: CLINIC | Age: 87
End: 2022-09-21
Payer: MEDICARE

## 2022-09-21 VITALS
RESPIRATION RATE: 18 BRPM | OXYGEN SATURATION: 96 % | TEMPERATURE: 97.9 F | BODY MASS INDEX: 21.79 KG/M2 | WEIGHT: 111 LBS | SYSTOLIC BLOOD PRESSURE: 124 MMHG | HEIGHT: 60 IN | HEART RATE: 47 BPM | DIASTOLIC BLOOD PRESSURE: 70 MMHG

## 2022-09-21 DIAGNOSIS — Z79.899 MEDICATION DOSE CHANGED: ICD-10-CM

## 2022-09-21 DIAGNOSIS — E53.8 VITAMIN B 12 DEFICIENCY: Primary | ICD-10-CM

## 2022-09-21 LAB — VIT B12 SERPL-MCNC: 1380 PG/ML (ref 232–1245)

## 2022-09-21 NOTE — LETTER
"    9/21/2022        RE: Ben Salvador  C/o Torin Salvador  [address] [address]        [facility] GERIATRICS    Chief Complaint   Patient presents with     RECHECK     HPI:  Ben Salvador is a 89 year old  (1/14/1928), who is being seen today for an episodic care visit at: [facility] ([facility]) [[id number]]. Today's concern is:   Patient resting in room today, feeling well.  With recent lab work done demonstrating an elevated vitamin B12 level.  Otherwise doing well.  No further concerns by  or family.    Allergies, and PMH/PSH reviewed in [facility] today.  REVIEW OF SYSTEMS:  4 point ROS including Respiratory, CV, GI and , other than that noted in the HPI,  is negative    Objective:   /70   Pulse (!) 47   Temp 97.9  F (36.6  C)   Resp 18   Ht 1.448 m (4' 9\")   Wt 50.3 kg (111 lb)   SpO2 96%   BMI 24.02 kg/m    A & O x 3, NAD. Lungs CTA, non labored. RRR, S1/S2 w/o murmur,gallop or rub.  edema.  Abdomen soft, nontender, +BT'S. No focal neurological deficits.    -open area left lower extremity       Labs done in SNF are in [facility]. Please refer to them using [employer]/Care Everywhere. and Recent labs in [facility] reviewed by me today.     Assessment/Plan:    ICD-10-CM    1. Vitamin B 12 deficiency  E53.8    2. Medication dose changed  Z79.899    -Acute increase in vitamin D level.  We will reduce cyanocobalamin 500 MCG every other day.  Recheck vitamin B12 level in 4 weeks on lab day.        Post Medication Reconciliation Status: Patient was not discharged from an inpatient facility or TCU        Orders:  See above     Electronically signed by:   Alix Tarango NP            Sincerely,        Alix Tarango NP    "

## 2022-09-21 NOTE — PROGRESS NOTES
"[facility] GERIATRICS    Chief Complaint   Patient presents with     RECHECK     HPI:  Ben Salvador is a 89 year old  (1/14/1928), who is being seen today for an episodic care visit at: [facility]) [[id number]]. Today's concern is:   Patient resting in room today, feeling well.  With recent lab work done demonstrating an elevated vitamin B12 level.  Otherwise doing well.  No further concerns by  or family.    Allergies, and PMH/PSH reviewed in [facility] today.  REVIEW OF SYSTEMS:  4 point ROS including Respiratory, CV, GI and , other than that noted in the HPI,  is negative    Objective:   /70   Pulse (!) 47   Temp 97.9  F (36.6  C)   Resp 18   Ht 1.448 m (4' 9\")   Wt 50.3 kg (111 lb)   SpO2 96%   BMI 24.02 kg/m    A & O x 3, NAD. Lungs CTA, non labored. RRR, S1/S2 w/o murmur,gallop or rub.  edema.  Abdomen soft, nontender, +BT'S. No focal neurological deficits.    -open area left lower extremity       Labs done in SNF are in [facility]. Please refer to them using [employer]/Care Everywhere. and Recent labs in [facility] reviewed by me today.     Assessment/Plan:    ICD-10-CM    1. Vitamin B 12 deficiency  E53.8    2. Medication dose changed  Z79.899    -Acute increase in vitamin D level.  We will reduce cyanocobalamin 500 MCG every other day.  Recheck vitamin B12 level in 4 weeks on lab day.        Post Medication Reconciliation Status: Patient was not discharged from an inpatient facility or TCU        Orders:  See above     Electronically signed by:   Alix Tarango NP      "

## 2022-10-04 PROBLEM — Z87.898 PERSONAL HISTORY OF OTHER SPECIFIED CONDITIONS: Status: ACTIVE | Noted: 2019-05-07

## 2022-10-05 ENCOUNTER — ASSISTED LIVING VISIT (OUTPATIENT)
Dept: GERIATRICS | Facility: CLINIC | Age: 87
End: 2022-10-05
Payer: MEDICARE

## 2022-10-05 VITALS
HEART RATE: 47 BPM | OXYGEN SATURATION: 94 % | RESPIRATION RATE: 12 BRPM | DIASTOLIC BLOOD PRESSURE: 70 MMHG | HEIGHT: 60 IN | WEIGHT: 111 LBS | BODY MASS INDEX: 21.79 KG/M2 | TEMPERATURE: 97.6 F | SYSTOLIC BLOOD PRESSURE: 124 MMHG

## 2022-10-05 DIAGNOSIS — L03.115 CELLULITIS OF RIGHT LEG: Primary | ICD-10-CM

## 2022-10-05 RX ORDER — CEPHALEXIN 500 MG/1
500 CAPSULE ORAL 2 TIMES DAILY
Qty: 14 CAPSULE | Refills: 0 | Status: SHIPPED | OUTPATIENT
Start: 2022-10-05 | End: 2022-10-12

## 2022-10-05 NOTE — LETTER
"    10/5/2022        RE: Ben Salvador  C/o Torin Salvador  [address] [address]        [facility] GERIATRICS    Chief Complaint   Patient presents with     RECHECK     HPI:  Ben Salvador is a 89 year old  (1/14/1928), who is being seen today for an episodic care visit at: [facility] ([facility]) [[id number]]. Today's concern is: patient with ulceration of right leg. Redness and pain noted. Patient refusing outside [facility] for wound care. VS otherwise stable.   -left LE wounds healed.     Allergies, and PMH/PSH reviewed in [facility] today.  REVIEW OF SYSTEMS:  4 point ROS including Respiratory, CV, GI and , other than that noted in the HPI,  is negative    Objective:   /70   Pulse (!) 47   Temp 97.6  F (36.4  C)   Resp 12   Ht 1.448 m (4' 9\")   Wt 50.3 kg (111 lb)   SpO2 94%   BMI 24.02 kg/m    A & O x 3, NAD. Lungs CTA, non labored. RRR, S1/S2 w/o murmur,gallop or rub.  edema.  Abdomen soft, nontender, +BT'S. No focal neurological deficits.  open, bleeding wounds right LE. Erythema noted, pain with palpation.       Labs done in SNF are in [place] [facility]. Please refer to them using [employer]/Care Everywhere. and Recent labs in [facility] reviewed by me today.     Assessment/Plan:  (L03.115) Cellulitis of right leg  (primary encounter diagnosis)  Comment: Signs and symptoms consistent with cellulitis of right lower extremities. Refuses [facility] for wound care.   Plan: cephALEXin (KEFLEX) 500 MG capsule: take 500 mg PO BID x 7 days.           Post Medication Reconciliation Status: Patient was not discharged from an inpatient facility or TCU        Orders:  1. Keflex 500 mg PO BID x7 days. Diagnosis: Cellulitis right lower extremity.     Electronically signed by:   Alix Tarango NP            Sincerely,        Alix Tarango NP    "

## 2022-10-05 NOTE — PROGRESS NOTES
"[facility] GERIATRICS    Chief Complaint   Patient presents with     RECHECK     HPI:  Ben Salvador is a 89 year old  (1/14/1928), who is being seen today for an episodic care visit at: [facility]) [[id number]]. Today's concern is: patient with ulceration of right leg. Redness and pain noted. Patient refusing outside [facility] for wound care. VS otherwise stable.   -left LE wounds healed.     Allergies, and PMH/PSH reviewed in [facility] today.  REVIEW OF SYSTEMS:  4 point ROS including Respiratory, CV, GI and , other than that noted in the HPI,  is negative    Objective:   /70   Pulse (!) 47   Temp 97.6  F (36.4  C)   Resp 12   Ht 1.448 m (4' 9\")   Wt 50.3 kg (111 lb)   SpO2 94%   BMI 24.02 kg/m    A & O x 3, NAD. Lungs CTA, non labored. RRR, S1/S2 w/o murmur,gallop or rub.  edema.  Abdomen soft, nontender, +BT'S. No focal neurological deficits.  open, bleeding wounds right LE. Erythema noted, pain with palpation.       Labs done in SNF are in [facility]. Please refer to them using [employer]/Care Everywhere. and Recent labs in [facility] reviewed by me today.     Assessment/Plan:  (L03.115) Cellulitis of right leg  (primary encounter diagnosis)  Comment: Signs and symptoms consistent with cellulitis of right lower extremities. Refuses [facility] for wound care.   Plan: cephALEXin (KEFLEX) 500 MG capsule: take 500 mg PO BID x 7 days.           Post Medication Reconciliation Status: Patient was not discharged from an inpatient facility or TCU        Orders:  1. Keflex 500 mg PO BID x7 days. Diagnosis: Cellulitis right lower extremity.     Electronically signed by:   Alix Tarango NP      "

## 2022-10-12 ENCOUNTER — ASSISTED LIVING VISIT (OUTPATIENT)
Dept: GERIATRICS | Facility: CLINIC | Age: 87
End: 2022-10-12
Payer: MEDICARE

## 2022-10-12 VITALS
HEART RATE: 47 BPM | SYSTOLIC BLOOD PRESSURE: 124 MMHG | DIASTOLIC BLOOD PRESSURE: 70 MMHG | RESPIRATION RATE: 12 BRPM | BODY MASS INDEX: 21.79 KG/M2 | OXYGEN SATURATION: 97 % | TEMPERATURE: 97.6 F | HEIGHT: 60 IN | WEIGHT: 111 LBS

## 2022-10-12 DIAGNOSIS — L03.115 CELLULITIS OF RIGHT LEG: Primary | ICD-10-CM

## 2022-10-12 NOTE — LETTER
"    10/12/2022        RE: Ben Salvador  C/o Torin Salvador  [address] [address]        [facility] GERIATRICS    Chief Complaint   Patient presents with     RECHECK     F2F Wound     HPI:  Ben Salvador is a 89 year old  (1/14/1928), who is being seen today for an episodic care visit at: [facility] ([facility]) [[id number]]. Today's concern is: Patient with resolving cellulitis right lower extremity, 3 ulcers noted with scabbing.  Patient denies pain, generalized malaise.  Declining wound care at this time stating his wounds are healing.  No further concerns per staff or patient.    BP Readings from Last 3 Encounters:   10/12/22 124/70   10/05/22 124/70   09/21/22 124/70     Pulse Readings from Last 4 Encounters:   10/12/22 (!) 47   10/05/22 (!) 47   09/21/22 (!) 47   09/14/22 62     Wt Readings from Last 4 Encounters:   10/12/22 50.3 kg (111 lb)   10/05/22 50.3 kg (111 lb)   09/21/22 50.3 kg (111 lb)   01/21/22 50.3 kg (111 lb)         Allergies, and PMH/PSH reviewed in [facility] today.  REVIEW OF SYSTEMS:  4 point ROS including Respiratory, CV, GI and , other than that noted in the HPI,  is negative    Objective:   /70   Pulse (!) 47   Temp 97.6  F (36.4  C)   Resp 12   Ht 1.448 m (4' 9\")   Wt 50.3 kg (111 lb)   SpO2 97%   BMI 24.02 kg/m    A & O x 3, NAD. Lungs CTA, non labored. RRR, S1/S2 w/o murmur,gallop or rub.  edema.  Abdomen soft, nontender, +BT'S. No focal neurological deficits. Open ulceration right lower extremity scabs noted and without drainage.       Labs done in SNF are in [facility]. Please refer to them using [employer]/Care Everywhere. and Recent labs in [facility] reviewed by me today.     Assessment/Plan:  (L03.115) Cellulitis of right leg  (primary encounter diagnosis)  Comment: Improving with Keflex.  Ulceration showing healing.  Patient refusing wound care RN.  Plan:   -Continue Vaseline around tissue, 4 x 4 gauze, ABD and Kerlix.  Change dressing every other " day.  Continue Keflex until completed      Post Medication Reconciliation Status: Patient was not discharged from an inpatient facility or TCU        Orders:  No new orders at this time    Electronically signed by:   Alix Tarango NP            Sincerely,        Alix Tarango NP

## 2022-10-12 NOTE — PROGRESS NOTES
"[facility] GERIATRICS    Chief Complaint   Patient presents with     RECHECK     F2F Wound     HPI:  Ben Salvador is a 89 year old  (1/14/1928), who is being seen today for an episodic care visit at: [facility]) [[id number]]. Today's concern is: Patient with resolving cellulitis right lower extremity, 3 ulcers noted with scabbing.  Patient denies pain, generalized malaise.  Declining wound care at this time stating his wounds are healing.  No further concerns per staff or patient.    BP Readings from Last 3 Encounters:   10/12/22 124/70   10/05/22 124/70   09/21/22 124/70     Pulse Readings from Last 4 Encounters:   10/12/22 (!) 47   10/05/22 (!) 47   09/21/22 (!) 47   09/14/22 62     Wt Readings from Last 4 Encounters:   10/12/22 50.3 kg (111 lb)   10/05/22 50.3 kg (111 lb)   09/21/22 50.3 kg (111 lb)   01/21/22 50.3 kg (111 lb)         Allergies, and PMH/PSH reviewed in [facility] today.  REVIEW OF SYSTEMS:  4 point ROS including Respiratory, CV, GI and , other than that noted in the HPI,  is negative    Objective:   /70   Pulse (!) 47   Temp 97.6  F (36.4  C)   Resp 12   Ht 1.448 m (4' 9\")   Wt 50.3 kg (111 lb)   SpO2 97%   BMI 24.02 kg/m    A & O x 3, NAD. Lungs CTA, non labored. RRR, S1/S2 w/o murmur,gallop or rub.  edema.  Abdomen soft, nontender, +BT'S. No focal neurological deficits. Open ulceration right lower extremity scabs noted and without drainage.       Labs done in SNF are in [facility]. Please refer to them using [employer]/Care Everywhere. and Recent labs in [facility] reviewed by me today.     Assessment/Plan:  (L03.115) Cellulitis of right leg  (primary encounter diagnosis)  Comment: Improving with Keflex.  Ulceration showing healing.  Patient refusing wound care RN.  Plan:   -Continue Vaseline around tissue, 4 x 4 gauze, ABD and Kerlix.  Change dressing every other day.  Continue Keflex until completed      Post Medication Reconciliation Status: Patient was not discharged " from an inpatient facility or TCU        Orders:  No new orders at this time    Electronically signed by:   Alix Tarango NP

## 2022-10-19 ENCOUNTER — LAB REQUISITION (OUTPATIENT)
Dept: LAB | Facility: CLINIC | Age: 87
End: 2022-10-19
Payer: MEDICARE

## 2022-10-19 ENCOUNTER — ASSISTED LIVING VISIT (OUTPATIENT)
Dept: GERIATRICS | Facility: CLINIC | Age: 87
End: 2022-10-19
Payer: MEDICARE

## 2022-10-19 VITALS
OXYGEN SATURATION: 96 % | RESPIRATION RATE: 20 BRPM | WEIGHT: 111 LBS | TEMPERATURE: 97.7 F | HEIGHT: 60 IN | HEART RATE: 88 BPM | DIASTOLIC BLOOD PRESSURE: 73 MMHG | SYSTOLIC BLOOD PRESSURE: 104 MMHG | BODY MASS INDEX: 21.79 KG/M2

## 2022-10-19 DIAGNOSIS — F32.A DEPRESSION, UNSPECIFIED DEPRESSION TYPE: ICD-10-CM

## 2022-10-19 DIAGNOSIS — M10.9 GOUT, UNSPECIFIED CAUSE, UNSPECIFIED CHRONICITY, UNSPECIFIED SITE: ICD-10-CM

## 2022-10-19 DIAGNOSIS — G20.A1 PARKINSON DISEASE (H): ICD-10-CM

## 2022-10-19 DIAGNOSIS — Z81.0 FAMILY HISTORY OF INTELLECTUAL DISABILITIES: ICD-10-CM

## 2022-10-19 DIAGNOSIS — L03.115 CELLULITIS OF RIGHT LEG: Primary | ICD-10-CM

## 2022-10-19 DIAGNOSIS — S81.801D MULTIPLE OPEN WOUNDS OF RIGHT LOWER EXTREMITY, SUBSEQUENT ENCOUNTER: ICD-10-CM

## 2022-10-19 DIAGNOSIS — S81.802S: ICD-10-CM

## 2022-10-19 NOTE — PROGRESS NOTES
"[facility] GERIATRICS    Chief Complaint   Patient presents with     RECHECK     HPI:  Ben Salvador is a 89 year old  (1/14/1928), who is being seen today for an episodic care visit at: [facility] ([facility]) [[id number]]. Today's concern is right lower extremities wounds.   Per nursing he is refusing cares including BID prescribed lotions and only wants it daily at night.         His past medical history includes     Anxiety/depression    protein malnutrition    Heart failure disease    CKD    HTN    CAD    Gout    myelodysplastic syndrome    Pernicious anemia    Today patient was seen in his room and is wife was present.  He was sitting his in wheelchair in the same spot each visit with no TV or music.   His wife is always on the couch in the living room.  He offers no complaints.  He denies pain, numbness/tingling tremors, problems sleeping, chest pain, shortness of breath, dizziness, lightheadedness, and a poor appetite.  No recent episodes of gout.      His bilateral LE were examined.      Allergies, and PMH/PSH reviewed in [facility] today.  REVIEW OF SYSTEMS:  10 point ROS of systems including Constitutional, Eyes, Respiratory, Cardiovascular, Gastroenterology, Genitourinary, Integumentary, Musculoskeletal, Psychiatric were all negative except for pertinent positives noted in my HPI.    Objective:   /73   Pulse 88   Temp 97.7  F (36.5  C)   Resp 20   Ht 1.448 m (4' 9\")   Wt 50.3 kg (111 lb)   SpO2 96%   BMI 24.02 kg/m    Physical Exam  Constitutional:       General: He is not in acute distress.     Appearance: He is not ill-appearing.   HENT:      Head: Normocephalic.      Nose: Nose normal.      Mouth/Throat:      Mouth: Mucous membranes are moist.   Pulmonary:      Effort: Pulmonary effort is normal.   Musculoskeletal:      Cervical back: Normal range of motion.   Skin:     General: Skin is warm.          Neurological:      Mental Status: He is alert.   Psychiatric:         Attention and " Perception: Attention normal.         Mood and Affect: Affect is flat.         Speech: Speech normal.         Behavior: Behavior normal.             Most Recent 3 CBC's:  Recent Labs   Lab Test 09/13/22  1130 10/26/20  1310 10/26/20  0000 08/17/20  1539 08/17/20  0000   WBC 7.9  --   --  6.5 6.5   HGB 10.3* 10.2* 10.2* 9.6* 9.6*   MCV 98  --   --  96 96     --   --  164 164     Most Recent 3 BMP's:  Recent Labs   Lab Test 09/13/22  1130 02/04/21  1108 02/04/21  0000    142 142   POTASSIUM 5.1 4.7 4.7   CHLORIDE 106 106 106   CO2 26 26 26   BUN 49.3* 56* 56*   CR 1.79* 1.94* 1.94*   ANIONGAP 10 10 10   LUZ 9.3 9.2 9.2   * 103 103       Assessment/Plan:  (L03.115) Cellulitis of right leg  (primary encounter diagnosis)  (S81.801D) Multiple open wounds of right lower extremity, subsequent encounter  (S81.802S) Multiple open wounds of lower extremity, left, sequela  Comment: PT has multiple open areas on bilateral LE extermities.  He refused BID cares but will accept cares once daily.    Plan: Will add wound care to Left LE    Cleanse wound with vasche solution   Dry   Apply Vaseline   Apply ABD   Wrap with Kerlix and secure with tape   Ammonium lactate lotion to hydrate other areas        (M10.9) Gout, unspecified cause, unspecified chronicity, unspecified site  Comment:  Has not had recent episodes of gout.    Plan: Will discontinue allopurinol.      (F32.A) Depression, unspecified depression type  Comment: Isolates himself in his room and refuses to be referred to specialists.  Does not leave apartment for activities or meals.  Currently taking Celexa 20 mg daily and Remeron 15 mg daily and trazodone 100 mg daily at bedtime.   Plan: Will continue and would not decrease for fear of decompensation.     (G20) Parkinson's disease  Comment: Chronic stable while taking sinemet 10/100 mg daily.  Has a hx of LE tremors and freezing.  Denies tremors and dysphagia.  Uses a wheelchair for  transportation.  Plan: Continue with plan of care no changes at this time, adjustment as needed        Post Medication Reconciliation Status: Patient was not discharged from an inpatient facility or TCU        Orders:  Discontinue allopurinol  To left Lower extremity:  Cleanse wound with vasche solution   Dry   Apply Vaseline   Apply ABD   Wrap with Kerlix and secure with tape   Ammonium lactate lotion to hydrate other areas    Electronically signed by:     SYBIL Knowles, CNP     I was present with Cleopatra Ham, who participated in the service and in the documentation of this note. I have verified the history and personally performed the physical exam and medical decision making. I agree with the assessment and plan of care as documented in the note.

## 2022-10-19 NOTE — LETTER
"    10/19/2022        RE: Ben Salvador  C/o Torin Salvador  [address] [address]        [facility] GERIATRICS    Chief Complaint   Patient presents with     RECHECK     HPI:  Ben Salvador is a 89 year old  (1/14/1928), who is being seen today for an episodic care visit at: [facility] ([facility]) [[id number]]. Today's concern is right lower extremities wounds.   Per nursing he is refusing cares including BID prescribed lotions and only wants it daily at night.         His past medical history includes     Anxiety/depression    protein malnutrition    Heart failure disease    CKD    HTN    CAD    Gout    myelodysplastic syndrome    Pernicious anemia    Today patient was seen in his room and is wife was present.  He was sitting his in wheelchair in the same spot each visit with no TV or music.   His wife is always on the couch in the living room.  He offers no complaints.  He denies pain, numbness/tingling tremors, problems sleeping, chest pain, shortness of breath, dizziness, lightheadedness, and a poor appetite.  No recent episodes of gout.      His bilateral LE were examined.      Allergies, and PMH/PSH reviewed in [facility] today.  REVIEW OF SYSTEMS:  10 point ROS of systems including Constitutional, Eyes, Respiratory, Cardiovascular, Gastroenterology, Genitourinary, Integumentary, Musculoskeletal, Psychiatric were all negative except for pertinent positives noted in my HPI.    Objective:   /73   Pulse 88   Temp 97.7  F (36.5  C)   Resp 20   Ht 1.448 m (4' 9\")   Wt 50.3 kg (111 lb)   SpO2 96%   BMI 24.02 kg/m    Physical Exam  Constitutional:       General: He is not in acute distress.     Appearance: He is not ill-appearing.   HENT:      Head: Normocephalic.      Nose: Nose normal.      Mouth/Throat:      Mouth: Mucous membranes are moist.   Pulmonary:      Effort: Pulmonary effort is normal.   Musculoskeletal:      Cervical back: Normal range of motion.   Skin:     General: " Skin is warm.          Neurological:      Mental Status: He is alert.   Psychiatric:         Attention and Perception: Attention normal.         Mood and Affect: Affect is flat.         Speech: Speech normal.         Behavior: Behavior normal.             Most Recent 3 CBC's:  Recent Labs   Lab Test 09/13/22  1130 10/26/20  1310 10/26/20  0000 08/17/20  1539 08/17/20  0000   WBC 7.9  --   --  6.5 6.5   HGB 10.3* 10.2* 10.2* 9.6* 9.6*   MCV 98  --   --  96 96     --   --  164 164     Most Recent 3 BMP's:  Recent Labs   Lab Test 09/13/22  1130 02/04/21  1108 02/04/21  0000    142 142   POTASSIUM 5.1 4.7 4.7   CHLORIDE 106 106 106   CO2 26 26 26   BUN 49.3* 56* 56*   CR 1.79* 1.94* 1.94*   ANIONGAP 10 10 10   LUZ 9.3 9.2 9.2   * 103 103       Assessment/Plan:  (L03.115) Cellulitis of right leg  (primary encounter diagnosis)  (S81.801D) Multiple open wounds of right lower extremity, subsequent encounter  (S81.802S) Multiple open wounds of lower extremity, left, sequela  Comment: PT has multiple open areas on bilateral LE extermities.  He refused BID cares but will accept cares once daily.    Plan: Will add wound care to Left LE    Cleanse wound with vasche solution   Dry   Apply Vaseline   Apply ABD   Wrap with Kerlix and secure with tape   Ammonium lactate lotion to hydrate other areas        (M10.9) Gout, unspecified cause, unspecified chronicity, unspecified site  Comment:  Has not had recent episodes of gout.    Plan: Will discontinue allopurinol.      (F32.A) Depression, unspecified depression type  Comment: Isolates himself in his room and refuses to be referred to specialists.  Does not leave apartment for activities or meals.  Currently taking Celexa 20 mg daily and Remeron 15 mg daily and trazodone 100 mg daily at bedtime.   Plan: Will continue and would not decrease for fear of decompensation.     (G20) Parkinson's disease  Comment: Chronic stable while taking sinemet 10/100 mg daily.  Has  a hx of LE tremors and freezing.  Denies tremors and dysphagia.  Uses a wheelchair for transportation.  Plan: Continue with plan of care no changes at this time, adjustment as needed        Post Medication Reconciliation Status: Patient was not discharged from an inpatient facility or TCU        Orders:  Discontinue allopurinol  To left Lower extremity:  Cleanse wound with vasche solution   Dry   Apply Vaseline   Apply ABD   Wrap with Kerlix and secure with tape   Ammonium lactate lotion to hydrate other areas    Electronically signed by:     SYBIL Knowles, CNP     I was present with Cleopatra Ham, who participated in the service and in the documentation of this note. I have verified the history and personally performed the physical exam and medical decision making. I agree with the assessment and plan of care as documented in the note.             Sincerely,        Alix Tarango NP

## 2022-10-25 PROCEDURE — 82607 VITAMIN B-12: CPT | Mod: ORL | Performed by: NURSE PRACTITIONER

## 2022-10-26 ENCOUNTER — ASSISTED LIVING VISIT (OUTPATIENT)
Dept: GERIATRICS | Facility: CLINIC | Age: 87
End: 2022-10-26
Payer: MEDICARE

## 2022-10-26 VITALS
WEIGHT: 111 LBS | SYSTOLIC BLOOD PRESSURE: 104 MMHG | HEART RATE: 88 BPM | TEMPERATURE: 97.7 F | HEIGHT: 60 IN | DIASTOLIC BLOOD PRESSURE: 73 MMHG | BODY MASS INDEX: 21.79 KG/M2 | RESPIRATION RATE: 20 BRPM

## 2022-10-26 DIAGNOSIS — S81.801D WOUND OF RIGHT LOWER EXTREMITY, SUBSEQUENT ENCOUNTER: Primary | ICD-10-CM

## 2022-10-26 LAB — VIT B12 SERPL-MCNC: 1303 PG/ML (ref 232–1245)

## 2022-10-26 NOTE — PROGRESS NOTES
"[facility]    Chief Complaint   Patient presents with     RECHECK     HPI:  Ben Salvador is a 89 year old  (1/14/1928), who is being seen today for an episodic care visit at: [facility]) [[id number]]. Today's concern is: patient with ongoing wounds/ulcers of lower extremities. Followed with wound care but no longer interested in services. Recently treated for cellulitis with Keflex. Wounds with healing.     BP Readings from Last 3 Encounters:   10/26/22 104/73   10/19/22 104/73   10/12/22 124/70     Pulse Readings from Last 4 Encounters:   10/26/22 88   10/19/22 88   10/12/22 (!) 47   10/05/22 (!) 47     Wt Readings from Last 4 Encounters:   10/26/22 50.3 kg (111 lb)   10/19/22 50.3 kg (111 lb)   10/12/22 50.3 kg (111 lb)   10/05/22 50.3 kg (111 lb)         Allergies, and PMH/PSH reviewed in EPIC today.  REVIEW OF SYSTEMS:  4 point ROS including Respiratory, CV, GI and , other than that noted in the HPI,  is negative    Objective:   /73   Pulse 88   Temp 97.7  F (36.5  C)   Resp 20   Ht 1.448 m (4' 9\")   Wt 50.3 kg (111 lb)   BMI 24.02 kg/m    A & O x 3, NAD. Lungs CTA, non labored. RRR, S1/S2 w/o murmur,gallop or rub.  edema.  Abdomen soft, nontender, +BT'S. No focal neurological deficits. Skin: open ulcers right LE. Dressing DCI       Labs done in SNF are in [facility]. Please refer to them using [employer]/Care Everywhere. and Recent labs in [facility] reviewed by me today.     Assessment/Plan:  (K44.438I) Wound of right lower extremity, subsequent encounter  (primary encounter diagnosis)  Comment: Ongoing wounds bilateral LE. Patient refuses to elevate.   Plan:   -Vasoline bilateral LE daily  -Tubigrip on AM off PM        MED REC REQUIRED{  Post Medication Reconciliation Status: patient was not discharged from an inpatient facility or TCU      Orders:  See above       Electronically signed by:   Alix Tarango NP        "

## 2022-10-26 NOTE — LETTER
"    10/26/2022        RE: Ben Salvador  C/o Torin Salvador  [address] [address]        [facility] GERIATR[facility]    Chief Complaint   Patient presents with     RECHECK     HPI:  Ben Salvador is a 89 year old  (1/14/1928), who is being seen today for an episodic care visit at: [facility] ([facility]) [[id number]]. Today's concern is: patient with ongoing wounds/ulcers of lower extremities. Followed with wound care but no longer interested in services. Recently treated for cellulitis with Keflex. Wounds with healing.     BP Readings from Last 3 Encounters:   10/26/22 104/73   10/19/22 104/73   10/12/22 124/70     Pulse Readings from Last 4 Encounters:   10/26/22 88   10/19/22 88   10/12/22 (!) 47   10/05/22 (!) 47     Wt Readings from Last 4 Encounters:   10/26/22 50.3 kg (111 lb)   10/19/22 50.3 kg (111 lb)   10/12/22 50.3 kg (111 lb)   10/05/22 50.3 kg (111 lb)         Allergies, and PMH/PSH reviewed in EPIC today.  REVIEW OF SYSTEMS:  4 point ROS including Respiratory, CV, GI and , other than that noted in the HPI,  is negative    Objective:   /73   Pulse 88   Temp 97.7  F (36.5  C)   Resp 20   Ht 1.448 m (4' 9\")   Wt 50.3 kg (111 lb)   BMI 24.02 kg/m    A & O x 3, NAD. Lungs CTA, non labored. RRR, S1/S2 w/o murmur,gallop or rub.  edema.  Abdomen soft, nontender, +BT'S. No focal neurological deficits. Skin: open ulcers right LE. Dressing DCI       Labs done in SNF are in [facility]. Please refer to them using [employer]/Care Everywhere. and Recent labs in [facility] reviewed by me today.     Assessment/Plan:  (T47.006R) Wound of right lower extremity, subsequent encounter  (primary encounter diagnosis)  Comment: Ongoing wounds bilateral LE. Patient refuses to elevate.   Plan:   -Vasoline bilateral LE daily  -Tubigrip on AM off PM        MED REC REQUIRED{  Post Medication Reconciliation Status: patient was not discharged from an inpatient facility or TCU      Orders:  See above "       Electronically signed by:   Alix Tarango, ARIAS              Sincerely,        Alix Tarango, NP

## 2022-11-16 NOTE — PROGRESS NOTES
"[facility] GERIATRICS    Chief Complaint   Patient presents with     RECHECK     HPI:  Ben Salvador is a 89 year old  (1/14/1928), who is being seen today for an episodic care visit at: [facility] ([facility]) [[id number]]. Today's concern is: Patient requested NP visit to discuss current wound care orders. Would like to Ammonium Lactate to LE HS. Refusing wound care by Home care, refusing TEDS, tubigrips. Denies pain, drainage, etc. Feels they are healed.     BP Readings from Last 3 Encounters:   11/16/22 104/73   10/26/22 104/73   10/19/22 104/73     Pulse Readings from Last 4 Encounters:   11/16/22 88   10/26/22 88   10/19/22 88   10/12/22 (!) 47         Allergies, and PMH/PSH reviewed in EPIC today.  REVIEW OF SYSTEMS:  4 point ROS including Respiratory, CV, GI and , other than that noted in the HPI,  is negative    Objective:   /73   Pulse 88   Temp 97.7  F (36.5  C)   Resp 20   Ht 1.702 m (5' 7\")   Wt 49.9 kg (110 lb)   SpO2 96%   BMI 17.23 kg/m    A & O x 3, NAD. Lungs CTA, non labored. RRR, S1/S2 w/o murmur,gallop or rub.  edema.  Abdomen soft, nontender, +BT'S. No focal neurological deficits.        Labs done in SNF are in [facility]. Please refer to them using [facility]/Care Everywhere. and Recent labs in [facility] reviewed by me today.     Assessment/Plan:  (S80.801D) Wound of right lower extremity, subsequent encounter  (primary encounter diagnosis)  Comment: Chronic bilateral LE wounds with cellulitis. Difficult to treat given patient's refusal of wound care, or compression stockings.   Plan:   -Discontinue tubigrips  -Ammonium Lactate q HS  -encourage elevation.       MED REC REQUIRED{  Post Medication Reconciliation Status: patient was not discharged from an inpatient facility or TCU      Orders:  -Ammonium Lactate LE HS  -Discontinue compression stockings    Electronically signed by:  Alix Tarango NP      "

## 2022-11-16 NOTE — LETTER
"    11/16/2022        RE: Ben Salvador  C/o Torin Salvador  [address] [address]        [facility] GERIATRICS    Chief Complaint   Patient presents with     RECHECK     HPI:  Ben Salvador is a 89 year old  (1/14/1928), who is being seen today for an episodic care visit at: [facility] ([facility]) [[id number]]. Today's concern is: Patient requested NP visit to discuss current wound care orders. Would like to Ammonium Lactate to LE HS. Refusing wound care by Home care, refusing TEDS, tubigrips. Denies pain, drainage, etc. Feels they are healed.     BP Readings from Last 3 Encounters:   11/16/22 104/73   10/26/22 104/73   10/19/22 104/73     Pulse Readings from Last 4 Encounters:   11/16/22 88   10/26/22 88   10/19/22 88   10/12/22 (!) 47         Allergies, and PMH/PSH reviewed in EPIC today.  REVIEW OF SYSTEMS:  4 point ROS including Respiratory, CV, GI and , other than that noted in the HPI,  is negative    Objective:   /73   Pulse 88   Temp 97.7  F (36.5  C)   Resp 20   Ht 1.702 m (5' 7\")   Wt 49.9 kg (110 lb)   SpO2 96%   BMI 17.23 kg/m    A & O x 3, NAD. Lungs CTA, non labored. RRR, S1/S2 w/o murmur,gallop or rub.  edema.  Abdomen soft, nontender, +BT'S. No focal neurological deficits.        Labs done in SNF are in [place] [facility]. Please refer to them using [employer]/Care Everywhere. and Recent labs in EP[facility] reviewed by me today.     Assessment/Plan:  (S84.801D) Wound of right lower extremity, subsequent encounter  (primary encounter diagnosis)  Comment: Chronic bilateral LE wounds with cellulitis. Difficult to treat given patient's refusal of wound care, or compression stockings.   Plan:   -Discontinue tubigrips  -Ammonium Lactate q HS  -encourage elevation.       MED REC REQUIRED{  Post Medication Reconciliation Status: patient was not discharged from an inpatient facility or TCU      Orders:  -Ammonium Lactate LE HS  -Discontinue compression stockings    Electronically " signed by:  Alix Tarango, ARIAS            Sincerely,        Alix Tarango, NP

## 2022-11-30 NOTE — LETTER
11/30/2022        RE: Ben Salvador  C/o Torin Salvador  [address]  [facility] [address]          This encounter was opened in error. Please disregard.        Sincerely,        Alix Tarango, NP

## 2022-12-07 NOTE — PROGRESS NOTES
"[facility]ERIATRICS    Chief Complaint   Patient presents with     RECHECK     LLE wounds     HPI:  Ben Salvador is a 89 year old  (1/14/1928), who is being seen today for an episodic care visit at: [facility]) [[id number]]. Today's concern is: Patient has ongoing bilateral LE dry skin, open ulcers, and history of cellulitis. Has been receiving daily dressing changes with ammonium lactate and kerlex. Today, skin is improved, patient requesting less frequent dressing changes; continues to deny home care services. Denies pain, drainage.     BP Readings from Last 3 Encounters:   12/07/22 126/66   11/30/22 126/66   11/16/22 104/73     Pulse Readings from Last 4 Encounters:   12/07/22 67   11/30/22 67   11/16/22 88   10/26/22 88     Wt Readings from Last 4 Encounters:   12/07/22 50.3 kg (111 lb)   11/30/22 49.9 kg (110 lb)   11/16/22 49.9 kg (110 lb)   10/26/22 50.3 kg (111 lb)     Allergies, and PMH/PSH reviewed in EPIC today.  REVIEW OF SYSTEMS:  4 point ROS including Respiratory, CV, GI and , other than that noted in the HPI,  is negative    Objective:   /66   Pulse 67   Temp 97.4  F (36.3  C)   Resp 18   Ht 1.448 m (4' 9\")   Wt 50.3 kg (111 lb)   SpO2 96%   BMI 24.02 kg/m    GENERAL APPEARANCE:  Alert, in no distress  RESP:  respiratory effort and palpation of chest normal, lungs clear to auscultation   CV:  Palpation and auscultation of heart done , regular rate and rhythm, no murmur, rub, or gallop, dry skin lower extremities.   M/S:   Gait and station normal  Digits and nails normal  SKIN:  bilateral LE dry skin. RLE ulcers healing.   NEURO:   Cranial nerves 2-12 are normal tested and grossly at patient's baseline  PSYCH:  oriented X 3, affect and mood normal    Labs done in SNF are in [facility]. Please refer to them using [facility]/Care Everywhere. and Recent labs in [facility] reviewed by me today.     Assessment/Plan:  (A47.586G) Wound of right lower extremity, subsequent " encounter  (primary encounter diagnosis)  Comment: Chronic, ongoing bilateral LE ulceration. Refusing home care and asking for less frequent dressing changes.   Plan:   -Continue Ammonium Lactate lotion and dressing changes every other day. Will follow up in 1 week to assess skin.   -Monitor for skin concerns and update NP as indicated.     MED REC REQUIRED  Post Medication Reconciliation Status: patient was not discharged from an inpatient facility or TCU      Orders:  1. Change daily dressing changes to every other day. Monitor skin and update provider as indicated.     Electronically signed by: SYBIL Bond CNP  [facility]

## 2022-12-07 NOTE — LETTER
"    12/7/2022        RE: Ben Salvador  C/o Torin Salvador  [address] [address]        [facility] GERIATRICS    Chief Complaint   Patient presents with     RECHECK     LLE wounds     HPI:  Ben Salvador is a 89 year old  (1/14/1928), who is being seen today for an episodic care visit at: [facility] [[id number]]. Today's concern is: Patient has ongoing bilateral LE dry skin, open ulcers, and history of cellulitis. Has been receiving daily dressing changes with ammonium lactate and kerlex. Today, skin is improved, patient requesting less frequent dressing changes; continues to deny home care services. Denies pain, drainage.     BP Readings from Last 3 Encounters:   12/07/22 126/66   11/30/22 126/66   11/16/22 104/73     Pulse Readings from Last 4 Encounters:   12/07/22 67   11/30/22 67   11/16/22 88   10/26/22 88     Wt Readings from Last 4 Encounters:   12/07/22 50.3 kg (111 lb)   11/30/22 49.9 kg (110 lb)   11/16/22 49.9 kg (110 lb)   10/26/22 50.3 kg (111 lb)     Allergies, and PMH/PSH reviewed in [facility] today.  REVIEW OF SYSTEMS:  4 point ROS including Respiratory, CV, GI and , other than that noted in the HPI,  is negative    Objective:   /66   Pulse 67   Temp 97.4  F (36.3  C)   Resp 18   Ht 1.448 m (4' 9\")   Wt 50.3 kg (111 lb)   SpO2 96%   BMI 24.02 kg/m    GENERAL APPEARANCE:  Alert, in no distress  RESP:  respiratory effort and palpation of chest normal, lungs clear to auscultation   CV:  Palpation and auscultation of heart done , regular rate and rhythm, no murmur, rub, or gallop, dry skin lower extremities.   M/S:   Gait and station normal  Digits and nails normal  SKIN:  bilateral LE dry skin. RLE ulcers healing.   NEURO:   Cranial nerves 2-12 are normal tested and grossly at patient's baseline  PSYCH:  oriented X 3, affect and mood normal    Labs done in SNF are in [facility]. Please refer to them using [address]/Care Everywhere. and Recent labs in [facility] " reviewed by me today.     Assessment/Plan:  (S8.801D) Wound of right lower extremity, subsequent encounter  (primary encounter diagnosis)  Comment: Chronic, ongoing bilateral LE ulceration. Refusing home care and asking for less frequent dressing changes.   Plan:   -Continue Ammonium Lactate lotion and dressing changes every other day. Will follow up in 1 week to assess skin.   -Monitor for skin concerns and update NP as indicated.     MED REC REQUIRED  Post Medication Reconciliation Status: patient was not discharged from an inpatient facility or TCU      Orders:  1. Change daily dressing changes to every other day. Monitor skin and update provider as indicated.     Electronically signed by: SYBIL Bond [facility]             Sincerely,        Alix Tarango, ARIAS

## 2023-01-01 ENCOUNTER — ASSISTED LIVING VISIT (OUTPATIENT)
Dept: GERIATRICS | Facility: CLINIC | Age: 88
End: 2023-01-01
Payer: MEDICARE

## 2023-01-01 ENCOUNTER — TELEPHONE (OUTPATIENT)
Dept: GERIATRICS | Facility: CLINIC | Age: 88
End: 2023-01-01
Payer: MEDICARE

## 2023-01-01 ENCOUNTER — LAB REQUISITION (OUTPATIENT)
Dept: LAB | Facility: CLINIC | Age: 88
End: 2023-01-01
Payer: MEDICARE

## 2023-01-01 ENCOUNTER — MEDICAL CORRESPONDENCE (OUTPATIENT)
Dept: HEALTH INFORMATION MANAGEMENT | Facility: CLINIC | Age: 88
End: 2023-01-01
Payer: MEDICARE

## 2023-01-01 ENCOUNTER — MEDICAL CORRESPONDENCE (OUTPATIENT)
Dept: HEALTH INFORMATION MANAGEMENT | Facility: CLINIC | Age: 88
End: 2023-01-01

## 2023-01-01 ENCOUNTER — TELEPHONE (OUTPATIENT)
Dept: GERIATRICS | Facility: CLINIC | Age: 88
End: 2023-01-01

## 2023-01-01 ENCOUNTER — HEALTH MAINTENANCE LETTER (OUTPATIENT)
Age: 88
End: 2023-01-01

## 2023-01-01 VITALS
DIASTOLIC BLOOD PRESSURE: 76 MMHG | RESPIRATION RATE: 12 BRPM | HEART RATE: 96 BPM | HEIGHT: 60 IN | WEIGHT: 114.8 LBS | TEMPERATURE: 97.2 F | OXYGEN SATURATION: 95 % | SYSTOLIC BLOOD PRESSURE: 145 MMHG | BODY MASS INDEX: 22.54 KG/M2

## 2023-01-01 VITALS
WEIGHT: 114 LBS | TEMPERATURE: 97.9 F | BODY MASS INDEX: 22.38 KG/M2 | RESPIRATION RATE: 18 BRPM | DIASTOLIC BLOOD PRESSURE: 59 MMHG | SYSTOLIC BLOOD PRESSURE: 111 MMHG | HEART RATE: 52 BPM | HEIGHT: 60 IN

## 2023-01-01 VITALS
WEIGHT: 118.6 LBS | BODY MASS INDEX: 23.29 KG/M2 | SYSTOLIC BLOOD PRESSURE: 133 MMHG | DIASTOLIC BLOOD PRESSURE: 64 MMHG | RESPIRATION RATE: 15 BRPM | OXYGEN SATURATION: 99 % | HEART RATE: 44 BPM | HEIGHT: 60 IN | TEMPERATURE: 97.8 F

## 2023-01-01 VITALS
BODY MASS INDEX: 22.54 KG/M2 | SYSTOLIC BLOOD PRESSURE: 111 MMHG | OXYGEN SATURATION: 95 % | TEMPERATURE: 97.9 F | RESPIRATION RATE: 18 BRPM | WEIGHT: 114.8 LBS | DIASTOLIC BLOOD PRESSURE: 59 MMHG | HEART RATE: 52 BPM | HEIGHT: 60 IN

## 2023-01-01 VITALS
TEMPERATURE: 97.7 F | WEIGHT: 119.2 LBS | OXYGEN SATURATION: 98 % | BODY MASS INDEX: 23.4 KG/M2 | DIASTOLIC BLOOD PRESSURE: 69 MMHG | SYSTOLIC BLOOD PRESSURE: 155 MMHG | HEIGHT: 60 IN | RESPIRATION RATE: 16 BRPM | HEART RATE: 57 BPM

## 2023-01-01 VITALS — HEIGHT: 60 IN | WEIGHT: 114 LBS | BODY MASS INDEX: 22.38 KG/M2

## 2023-01-01 DIAGNOSIS — F41.9 ANXIETY: ICD-10-CM

## 2023-01-01 DIAGNOSIS — R52 PAIN: ICD-10-CM

## 2023-01-01 DIAGNOSIS — R35.0 FREQUENCY OF MICTURITION: ICD-10-CM

## 2023-01-01 DIAGNOSIS — R25.3 MUSCLE TWITCHING: ICD-10-CM

## 2023-01-01 DIAGNOSIS — G20.A1 PARKINSON DISEASE (H): Primary | ICD-10-CM

## 2023-01-01 DIAGNOSIS — E78.5 HYPERLIPIDEMIA, UNSPECIFIED: ICD-10-CM

## 2023-01-01 DIAGNOSIS — E56.9 VITAMIN DEFICIENCY, UNSPECIFIED: ICD-10-CM

## 2023-01-01 DIAGNOSIS — I10 ESSENTIAL (PRIMARY) HYPERTENSION: ICD-10-CM

## 2023-01-01 DIAGNOSIS — D46.9 MYELODYSPLASTIC SYNDROME (H): ICD-10-CM

## 2023-01-01 DIAGNOSIS — Z53.9 DIAGNOSIS NOT YET DEFINED: Primary | ICD-10-CM

## 2023-01-01 DIAGNOSIS — G20.A2 PARKINSON'S DISEASE WITHOUT DYSKINESIA, WITH FLUCTUATING MANIFESTATIONS (H): ICD-10-CM

## 2023-01-01 DIAGNOSIS — F33.1 MODERATE EPISODE OF RECURRENT MAJOR DEPRESSIVE DISORDER (H): ICD-10-CM

## 2023-01-01 DIAGNOSIS — R62.7 FAILURE TO THRIVE IN ADULT: ICD-10-CM

## 2023-01-01 DIAGNOSIS — Z51.5 HOSPICE CARE PATIENT: ICD-10-CM

## 2023-01-01 DIAGNOSIS — F32.9 MAJOR DEPRESSIVE DISORDER, SINGLE EPISODE, UNSPECIFIED: ICD-10-CM

## 2023-01-01 DIAGNOSIS — I25.118 CORONARY ARTERY DISEASE OF NATIVE HEART WITH STABLE ANGINA PECTORIS, UNSPECIFIED VESSEL OR LESION TYPE (H): ICD-10-CM

## 2023-01-01 DIAGNOSIS — Z53.9 ERRONEOUS ENCOUNTER--DISREGARD: Primary | ICD-10-CM

## 2023-01-01 DIAGNOSIS — D46.9 MYELODYSPLASTIC SYNDROME, UNSPECIFIED (H): ICD-10-CM

## 2023-01-01 DIAGNOSIS — S31.829A WOUND OF LEFT BUTTOCK, INITIAL ENCOUNTER: ICD-10-CM

## 2023-01-01 DIAGNOSIS — R62.7 FAILURE TO THRIVE IN ADULT: Primary | ICD-10-CM

## 2023-01-01 DIAGNOSIS — G47.00 INSOMNIA, UNSPECIFIED TYPE: ICD-10-CM

## 2023-01-01 DIAGNOSIS — I10 ESSENTIAL HYPERTENSION: ICD-10-CM

## 2023-01-01 DIAGNOSIS — R35.0 URINARY FREQUENCY: ICD-10-CM

## 2023-01-01 DIAGNOSIS — Z71.89 ADVANCED CARE PLANNING/COUNSELING DISCUSSION: ICD-10-CM

## 2023-01-01 DIAGNOSIS — F41.9 ANXIETY: Primary | ICD-10-CM

## 2023-01-01 DIAGNOSIS — R63.4 WEIGHT LOSS: Primary | ICD-10-CM

## 2023-01-01 DIAGNOSIS — S81.809D OPEN WOUND OF KNEE, LEG, AND ANKLE, UNSPECIFIED LATERALITY, SUBSEQUENT ENCOUNTER: Primary | ICD-10-CM

## 2023-01-01 DIAGNOSIS — R63.4 ABNORMAL WEIGHT LOSS: ICD-10-CM

## 2023-01-01 DIAGNOSIS — S81.009D OPEN WOUND OF KNEE, LEG, AND ANKLE, UNSPECIFIED LATERALITY, SUBSEQUENT ENCOUNTER: Primary | ICD-10-CM

## 2023-01-01 DIAGNOSIS — S91.009D OPEN WOUND OF KNEE, LEG, AND ANKLE, UNSPECIFIED LATERALITY, SUBSEQUENT ENCOUNTER: Primary | ICD-10-CM

## 2023-01-01 DIAGNOSIS — R63.4 WEIGHT LOSS: ICD-10-CM

## 2023-01-01 DIAGNOSIS — N18.32 STAGE 3B CHRONIC KIDNEY DISEASE (H): ICD-10-CM

## 2023-01-01 DIAGNOSIS — F41.1 GENERALIZED ANXIETY DISORDER: ICD-10-CM

## 2023-01-01 LAB
ALBUMIN SERPL BCG-MCNC: 3.8 G/DL (ref 3.5–5.2)
ALBUMIN UR-MCNC: NEGATIVE MG/DL
ALP SERPL-CCNC: 117 U/L (ref 40–129)
ALT SERPL W P-5'-P-CCNC: 6 U/L (ref 0–70)
ANION GAP SERPL CALCULATED.3IONS-SCNC: 12 MMOL/L (ref 7–15)
APPEARANCE UR: CLEAR
AST SERPL W P-5'-P-CCNC: 27 U/L (ref 0–45)
BACTERIA #/AREA URNS HPF: ABNORMAL /HPF
BACTERIA UR CULT: NORMAL
BILIRUB SERPL-MCNC: 0.6 MG/DL
BILIRUB UR QL STRIP: NEGATIVE
BUN SERPL-MCNC: 58.5 MG/DL (ref 8–23)
CALCIUM SERPL-MCNC: 9.7 MG/DL (ref 8.2–9.6)
CHLORIDE SERPL-SCNC: 105 MMOL/L (ref 98–107)
CHOLEST SERPL-MCNC: 163 MG/DL
COLOR UR AUTO: YELLOW
CREAT SERPL-MCNC: 1.73 MG/DL (ref 0.67–1.17)
DEPRECATED CALCIDIOL+CALCIFEROL SERPL-MC: 93 UG/L (ref 20–75)
DEPRECATED HCO3 PLAS-SCNC: 26 MMOL/L (ref 22–29)
EGFRCR SERPLBLD CKD-EPI 2021: 36 ML/MIN/1.73M2
GLUCOSE SERPL-MCNC: 82 MG/DL (ref 70–99)
GLUCOSE UR STRIP-MCNC: NEGATIVE MG/DL
HDLC SERPL-MCNC: 46 MG/DL
HGB UR QL STRIP: NEGATIVE
KETONES UR STRIP-MCNC: NEGATIVE MG/DL
LDLC SERPL CALC-MCNC: 89 MG/DL
LEUKOCYTE ESTERASE UR QL STRIP: NEGATIVE
MUCOUS THREADS #/AREA URNS LPF: PRESENT /LPF
NITRATE UR QL: NEGATIVE
NONHDLC SERPL-MCNC: 117 MG/DL
PH UR STRIP: 7 [PH] (ref 5–7)
POTASSIUM SERPL-SCNC: 4.4 MMOL/L (ref 3.4–5.3)
PROT SERPL-MCNC: 6.5 G/DL (ref 6.4–8.3)
RBC URINE: 0 /HPF
SODIUM SERPL-SCNC: 143 MMOL/L (ref 135–145)
SP GR UR STRIP: 1.02 (ref 1–1.03)
SQUAMOUS EPITHELIAL: 1 /HPF
TRIGL SERPL-MCNC: 139 MG/DL
UROBILINOGEN UR STRIP-MCNC: NORMAL MG/DL
VIT B12 SERPL-MCNC: 1250 PG/ML (ref 232–1245)
WBC URINE: 1 /HPF

## 2023-01-01 PROCEDURE — 87086 URINE CULTURE/COLONY COUNT: CPT | Mod: ORL | Performed by: NURSE PRACTITIONER

## 2023-01-01 PROCEDURE — 99348 HOME/RES VST EST LOW MDM 30: CPT | Performed by: NURSE PRACTITIONER

## 2023-01-01 PROCEDURE — G0179 MD RECERTIFICATION HHA PT: HCPCS | Performed by: NURSE PRACTITIONER

## 2023-01-01 PROCEDURE — 82306 VITAMIN D 25 HYDROXY: CPT | Mod: ORL | Performed by: NURSE PRACTITIONER

## 2023-01-01 PROCEDURE — 36415 COLL VENOUS BLD VENIPUNCTURE: CPT | Mod: ORL | Performed by: NURSE PRACTITIONER

## 2023-01-01 PROCEDURE — P9603 ONE-WAY ALLOW PRORATED MILES: HCPCS | Mod: ORL | Performed by: NURSE PRACTITIONER

## 2023-01-01 PROCEDURE — 81001 URINALYSIS AUTO W/SCOPE: CPT | Mod: ORL | Performed by: NURSE PRACTITIONER

## 2023-01-01 PROCEDURE — 99350 HOME/RES VST EST HIGH MDM 60: CPT | Performed by: NURSE PRACTITIONER

## 2023-01-01 PROCEDURE — 80053 COMPREHEN METABOLIC PANEL: CPT | Mod: ORL | Performed by: NURSE PRACTITIONER

## 2023-01-01 PROCEDURE — 80061 LIPID PANEL: CPT | Mod: ORL | Performed by: NURSE PRACTITIONER

## 2023-01-01 PROCEDURE — 99348 HOME/RES VST EST LOW MDM 30: CPT | Mod: GV | Performed by: NURSE PRACTITIONER

## 2023-01-01 PROCEDURE — 82607 VITAMIN B-12: CPT | Mod: ORL | Performed by: NURSE PRACTITIONER

## 2023-01-01 RX ORDER — LORAZEPAM 0.5 MG/1
0.5 TABLET ORAL 2 TIMES DAILY
Qty: 60 TABLET | Refills: 0 | Status: SHIPPED | OUTPATIENT
Start: 2023-01-01

## 2023-01-01 RX ORDER — MULTIVIT-MIN/FA/LYCOPEN/LUTEIN .4-300-25
TABLET ORAL
Qty: 90 TABLET | Refills: 3 | Status: SHIPPED | OUTPATIENT
Start: 2023-01-01 | End: 2023-01-01

## 2023-01-01 RX ORDER — LORAZEPAM 0.5 MG/1
0.5 TABLET ORAL 2 TIMES DAILY
Qty: 60 TABLET | Refills: 0 | Status: SHIPPED | OUTPATIENT
Start: 2023-01-01 | End: 2023-01-01

## 2023-01-01 RX ORDER — HYDROMORPHONE HYDROCHLORIDE 2 MG/1
1 TABLET ORAL 3 TIMES DAILY
Qty: 120 TABLET | Refills: 0 | Status: SHIPPED | OUTPATIENT
Start: 2023-01-01

## 2023-01-01 RX ORDER — HYDROMORPHONE HYDROCHLORIDE 2 MG/1
1 TABLET ORAL 3 TIMES DAILY
Qty: 120 TABLET | Refills: 0 | Status: SHIPPED | OUTPATIENT
Start: 2023-01-01 | End: 2023-01-01

## 2023-01-01 RX ORDER — CITALOPRAM HYDROBROMIDE 20 MG/1
TABLET ORAL
Qty: 28 TABLET | Refills: 97 | Status: SHIPPED | OUTPATIENT
Start: 2023-01-01

## 2023-01-01 RX ORDER — ASPIRIN 81 MG/1
TABLET, CHEWABLE ORAL
Qty: 90 TABLET | Refills: 3 | Status: SHIPPED | OUTPATIENT
Start: 2023-01-01 | End: 2023-01-01

## 2023-01-01 RX ORDER — ATORVASTATIN CALCIUM 40 MG/1
TABLET, FILM COATED ORAL
Qty: 90 TABLET | Refills: 3 | Status: SHIPPED | OUTPATIENT
Start: 2023-01-01 | End: 2023-01-01

## 2023-01-01 RX ORDER — CHOLECALCIFEROL (VITAMIN D3) 25 MCG
TABLET ORAL
Qty: 180 TABLET | Refills: 3 | Status: SHIPPED | OUTPATIENT
Start: 2023-01-01 | End: 2023-01-01

## 2023-01-01 RX ORDER — TRAZODONE HYDROCHLORIDE 100 MG/1
TABLET ORAL
Qty: 90 TABLET | Refills: 97 | Status: SHIPPED | OUTPATIENT
Start: 2023-01-01

## 2023-01-01 RX ORDER — MIRTAZAPINE 15 MG/1
TABLET, FILM COATED ORAL
Qty: 90 TABLET | Refills: 97 | Status: SHIPPED | OUTPATIENT
Start: 2023-01-01

## 2023-01-01 RX ORDER — CARBIDOPA/LEVODOPA 10MG-100MG
TABLET ORAL
Qty: 56 TABLET | Refills: 97 | Status: SHIPPED | OUTPATIENT
Start: 2023-01-01

## 2023-01-01 RX ORDER — SENNA AND DOCUSATE SODIUM 50; 8.6 MG/1; MG/1
1 TABLET, FILM COATED ORAL AT BEDTIME
Start: 2023-01-01

## 2023-01-01 RX ORDER — ACETAMINOPHEN 325 MG/1
650 TABLET ORAL 3 TIMES DAILY
COMMUNITY

## 2023-01-18 NOTE — LETTER
"    1/18/2023        RE: Ben Martínez  C/o Torin Gifft  [address] [address]        [facility] GERIATRICS    Chief Complaint   Patient presents with     RECHECK     New wound LLE/[facility]  Routine     HPI:  Ben Martínez is a 89 year old  (1/14/1928), who is being seen today for an episodic care visit at: [facility] ([facility]) [[id number]]. Today's concern is: patient continues with LLE wounds. Patient is requesting orders for wound care with [facility]. No further concerns per staff, patient needs F2F.    BP Readings from Last 3 Encounters:   01/18/23 (!) 155/69   12/14/22 126/66   12/07/22 126/66     Pulse Readings from Last 4 Encounters:   01/18/23 57   12/14/22 67   12/07/22 67   11/30/22 67     Wt Readings from Last 4 Encounters:   01/18/23 54.1 kg (119 lb 3.2 oz)   12/14/22 50.3 kg (111 lb)   12/07/22 50.3 kg (111 lb)   11/30/22 49.9 kg (110 lb)     Allergies, and PMH/PSH reviewed in [facility] today.  REVIEW OF SYSTEMS:  10 point ROS of systems including Constitutional, Eyes, Respiratory, Cardiovascular, Gastroenterology, Genitourinary, Integumentary, Musculoskeletal, Psychiatric were all negative except for pertinent positives noted in my HPI.    Objective:   BP (!) 155/69   Pulse 57   Temp 97.7  F (36.5  C)   Resp 16   Ht 1.448 m (4' 9\")   Wt 54.1 kg (119 lb 3.2 oz)   SpO2 98%   BMI 25.79 kg/m    A & O x 3, NAD. Lungs CTA, non labored. RRR, S1/S2 w/o murmur,gallop or rub.  no edema.  Abdomen soft, nontender, +BT'S. No focal neurological deficits.  Open blisters LLE.       Recent labs in [facility] reviewed by me today.     Assessment/Plan:  (S81.009D,  S81.809D,  S91.009D) Open wound of knee, leg, and ankle, unspecified laterality, subsequent encounter  (primary encounter diagnosis)  Comment: Acute/chronic LLE wounds.   Plan:   -Patient needs F2F for home health care      MED REC REQUIRED  Post Medication Reconciliation Status: patient was not discharged from " an inpatient facility or TCU      Orders:  F2F for homecare     Electronically signed by:   Alix Tarango NP    Documentation of Face to Face and Certification for Home Health Services    I certify that patient: Ben Matrínez is under my care and that I, or a nurse practitioner or physician's assistant working with me, had a face-to-face encounter that meets the physician face-to-face encounter requirements with this patient on: 1/18/2023.    This encounter with the patient was in whole, or in part, for the following medical condition, which is the primary reason for home health care: LLE wounds.    I certify that, based on my findings, the following services are medically necessary home health services: Nursing.    My clinical findings support the need for the above services because: Nurse is needed: dressing changes. .    Further, I certify that my clinical findings support that this patient is homebound (i.e. absences from home require considerable and taxing effort and are for medical reasons or [employer] services or infrequently or of short duration when for other reasons) because: Requires assistance of another person or specialized equipment to access medical services because patient: Requires supervision of another for safe transfer...    Based on the above findings. I certify that this patient is confined to the home and needs intermittent skilled nursing care, physical therapy and/or speech therapy.  The patient is under my care, and I have initiated the establishment of the plan of care.  This patient will be followed by a physician who will periodically review the plan of care.  Physician/Provider to provide follow up care: Alix Tarango    Attending hospital physician (the Medicare certified [facility] provider): Alix Tarango NP  Physician Signature: See electronic signature associated with these discharge orders.  Date: 1/21/2023            Sincerely,        Alix Tarango NP

## 2023-01-21 PROBLEM — E44.0 MODERATE PROTEIN MALNUTRITION (H): Status: RESOLVED | Noted: 2022-07-10 | Resolved: 2023-01-01

## 2023-03-16 NOTE — TELEPHONE ENCOUNTER
[facility] Triage Nurse Telephone Encounter    Provider: SYBIL Maurice CNP  Facility: [facility]  Facility Type:  AL    Caller: Lucina  Call Back Number: [phone]    Allergies:  No Known Allergies     Reason for call: Pt reported to nursing that he does not have much control of his left wrist. He can move his fingers and there is no pain. No swelling or redness. Pt reports no trauma. He states that it has been getting steadily worse for the past 1-2 weeks.    Verbal Order/Direction given by Provider: 2 view xray to left wrist.    Provider giving Order:  Yessenia Coffman MD    Verbal Order given to: Lucina Almeida RN

## 2023-04-26 NOTE — LETTER
4/26/2023        RE: Ben Martínez  C/o Torin Gifft  [address]  [facility] [address]          This encounter was opened in error. Please disregard.        Sincerely,        Alix Tarango, NP

## 2023-07-14 NOTE — TELEPHONE ENCOUNTER
[facility] Triage Nurse Telephone Encounter    Provider: SYBIL Maurice CNP  Facility: [facility]  Facility Type:  AL    Caller: Lucina  Call Back Number: [phone]    Allergies:  No Known Allergies     Reason for call: Pt has a patchy rash on his lower abdomen that wraps around to his back. Patch is purple/red in color, with irregular borders and some crusted discharge. Pt reports that it is itchy but not very painful. Pt refuses to be seen in clinic as he doesn't like to leave his apartment.    Verbal Order/Direction given by Provider: Valacyclovir 1000 mg PO Q8H x 7 days, dx: Shingles  Cover rash with 4x4s for protection    Provider giving Order:  SYBIL Maurice CNP    Verbal Order given to: Lucina Almeida RN

## 2023-08-31 NOTE — PROGRESS NOTES
"[facility] GERIATRICS    Chief Complaint   Patient presents with    Nursing Home Acute     ROUTINE     HPI:  Ben Martínez is a 89 year old  (1/14/1928), who is being seen today for an episodic care visit at: [facility] ([facility]) [[id number]]. Today's concern is: Patient seen in room for routine visit. Pleasant with writer and mood stable. Patient lost wife this past summer and has been stable and doing ok.  [facility] continues to do wound care on RLE, wound improving per documentation. Patient denies pain, SOB and lightheadedness. No further concerns per staff and family. Without recent falls or weight loss.     BP Readings from Last 3 Encounters:   08/31/23 (!) 145/76   04/26/23 133/64   01/18/23 (!) 155/69     Pulse Readings from Last 4 Encounters:   08/31/23 96   04/26/23 (!) 44   01/18/23 57   12/14/22 67     Wt Readings from Last 4 Encounters:   08/31/23 52.1 kg (114 lb 12.8 oz)   04/26/23 53.8 kg (118 lb 9.6 oz)   01/18/23 54.1 kg (119 lb 3.2 oz)   12/14/22 50.3 kg (111 lb)         Allergies, and PMH/PSH reviewed in [facility] today.  REVIEW OF SYSTEMS:  4 point ROS including Respiratory, CV, GI and , other than that noted in the HPI,  is negative    Objective:   BP (!) 145/76   Pulse 96   Temp 97.2  F (36.2  C)   Resp 12   Ht 1.448 m (4' 9\")   Wt 52.1 kg (114 lb 12.8 oz)   SpO2 95%   BMI 24.84 kg/m    GENERAL APPEARANCE:  Alert, in no distress  ENT:  Mouth and posterior oropharynx normal, moist mucous membranes, [place]  RESP:  respiratory effort and palpation of chest normal, lungs clear to auscultation   CV:  Palpation and auscultation of heart done , regular rate and rhythm, no murmur, rub, or gallop  ABDOMEN:  normal bowel sounds, soft, nontender, no hepatosplenomegaly or other masses  M/S:   Gait and station normal  Digits and nails normal  SKIN:  Inspection of skin and subcutaneous tissue baseline, Palpation of skin and subcutaneous tissue baseline  NEURO:   Cranial nerves 2-12 " are normal tested and grossly at patient's baseline  PSYCH:  oriented X 3    Recent labs in EPIC reviewed by me today.     Assessment/Plan:  (G20) Parkinson disease (H)  (primary encounter diagnosis)  Comment: Stable, no longer seeing neurology   -Continue carbidopa-levodopa BID.   -Continue AL support with meal delivery, medications and safety checks.     (I25.118) Coronary artery disease of native heart with stable angina pectoris, unspecified vessel or lesion type (H)  Comment: Chronic, stable  -Continue ASA and atorvastatin as ordered.     (D46.9) Myelodysplastic syndrome (H)  Comment: Per history, CBC stable     (F33.1) Moderate episode of recurrent major depressive disorder (H)  Comment: Longstanding history, stable on celexa    (N18.32) Stage 3b chronic kidney disease (H)  Comment:   Creatinine   Date Value Ref Range Status   09/13/2022 1.79 (H) 0.67 - 1.17 mg/dL Final   02/04/2021 1.94 (H) 0.70 - 1.30 mg/dL Final   Chronic, stable. Continue to renal dose when indicated and avoid nephrotoxic medications.       MED REC REQUIRED  Post Medication Reconciliation Status: patient was not discharged from an inpatient facility or TCU      Orders:  NNO    Electronically signed by:Alix Tarango NP

## 2023-09-01 NOTE — LETTER
"    9/1/2023        RE: Ben Martínez  C/o Torin Gifcarlota  [address] [facility] [address]        [facility] GERIATRICS    Chief Complaint   Patient presents with     Nursing Home Acute     ROUTINE     HPI:  Ben Martínez is a 89 year old  (1/14/1928), who is being seen today for an episodic care visit at: [facility] ([facility]) [[id number]]. Today's concern is: Patient seen in room for routine visit. Pleasant with writer and mood stable. Patient lost wife this past summer and has been stable and doing ok.  [facility] continues to do wound care on RLE, wound improving per documentation. Patient denies pain, SOB and lightheadedness. No further concerns per staff and family. Without recent falls or weight loss.     BP Readings from Last 3 Encounters:   08/31/23 (!) 145/76   04/26/23 133/64   01/18/23 (!) 155/69     Pulse Readings from Last 4 Encounters:   08/31/23 96   04/26/23 (!) 44   01/18/23 57   12/14/22 67     Wt Readings from Last 4 Encounters:   08/31/23 52.1 kg (114 lb 12.8 oz)   04/26/23 53.8 kg (118 lb 9.6 oz)   01/18/23 54.1 kg (119 lb 3.2 oz)   12/14/22 50.3 kg (111 lb)         Allergies, and PMH/PSH reviewed in EPIC today.  REVIEW OF SYSTEMS:  4 point ROS including Respiratory, CV, GI and , other than that noted in the HPI,  is negative    Objective:   BP (!) 145/76   Pulse 96   Temp 97.2  F (36.2  C)   Resp 12   Ht 1.448 m (4' 9\")   Wt 52.1 kg (114 lb 12.8 oz)   SpO2 95%   BMI 24.84 kg/m    GENERAL APPEARANCE:  Alert, in no distress  ENT:  Mouth and posterior oropharynx normal, moist mucous membranes, [place]  RESP:  respiratory effort and palpation of chest normal, lungs clear to auscultation   CV:  Palpation and auscultation of heart done , regular rate and rhythm, no murmur, rub, or gallop  ABDOMEN:  normal bowel sounds, soft, nontender, no hepatosplenomegaly or other masses  M/S:   Gait and station normal  Digits and nails normal  SKIN:  Inspection of skin and " subcutaneous tissue baseline, Palpation of skin and subcutaneous tissue baseline  NEURO:   Cranial nerves 2-12 are normal tested and grossly at patient's baseline  PSYCH:  oriented X 3    Recent labs in EPIC reviewed by me today.     Assessment/Plan:  (G20) Parkinson disease (H)  (primary encounter diagnosis)  Comment: Stable, no longer seeing neurology   -Continue carbidopa-levodopa BID.   -Continue AL support with meal delivery, medications and safety checks.     (I25.118) Coronary artery disease of native heart with stable angina pectoris, unspecified vessel or lesion type (H)  Comment: Chronic, stable  -Continue ASA and atorvastatin as ordered.     (D46.9) Myelodysplastic syndrome (H)  Comment: Per history, CBC stable     (F33.1) Moderate episode of recurrent major depressive disorder (H)  Comment: Longstanding history, stable on celexa    (N18.32) Stage 3b chronic kidney disease (H)  Comment:   Creatinine   Date Value Ref Range Status   09/13/2022 1.79 (H) 0.67 - 1.17 mg/dL Final   02/04/2021 1.94 (H) 0.70 - 1.30 mg/dL Final   Chronic, stable. Continue to renal dose when indicated and avoid nephrotoxic medications.       MED REC REQUIRED  Post Medication Reconciliation Status: patient was not discharged from an inpatient facility or TCU      Orders:  NNO    Electronically signed by:Alix Tarango NP         Sincerely,        Alix Tarango NP

## 2023-09-03 NOTE — TELEPHONE ENCOUNTER
[facility] NON-EMERGENT ENCOUNTER    Ben Martínez is a 89 year old  (1/14/1928), Voicemail Message received today regarding:   Unwitnessed fall    Disposition      Requests  FYI      Electronically signed by:   Gwen Barrios RN

## 2023-09-17 PROBLEM — M25.561 ACUTE PAIN OF RIGHT KNEE: Status: ACTIVE | Noted: 2023-01-01

## 2023-09-17 PROBLEM — I25.118 CORONARY ARTERY DISEASE OF NATIVE HEART WITH STABLE ANGINA PECTORIS, UNSPECIFIED VESSEL OR LESION TYPE (H): Status: ACTIVE | Noted: 2023-01-01

## 2023-09-17 PROBLEM — G20.A1 PARKINSON DISEASE (H): Status: ACTIVE | Noted: 2023-01-01

## 2023-09-22 NOTE — PROGRESS NOTES
"[facility] GERIATRICS    Chief Complaint   Patient presents with    RECHECK     HPI:  Ben Martínez is a 89 year old  (1/14/1928), who is being seen today for an episodic care visit at: [facility] ([facility]) [[id number]]. Today's concern is: Patient seen in room today trying to change her incontinence has been wheelchair.  Reports he is having increased urinary frequency and no longer using the urinal or red cup to void.  Denies pain, and urgency.  Additionally reports he had increased anxiety regarding the lift and is scared he will fall.  Staff reports he has become increasingly difficult with cares.  Weight loss noted.    Weight loss noted.   Wt Readings from Last 4 Encounters:   10/13/23 52.1 kg (114 lb 12.8 oz)   09/22/23 51.7 kg (114 lb)   08/31/23 52.1 kg (114 lb 12.8 oz)   04/26/23 53.8 kg (118 lb 9.6 oz)       Allergies, and PMH/PSH reviewed in EPIC today.  REVIEW OF SYSTEMS:  10 point ROS of systems including Constitutional, Eyes, Respiratory, Cardiovascular, Gastroenterology, Genitourinary, Integumentary, Musculoskeletal, Psychiatric were all negative except for pertinent positives noted in my HPI.    Objective:   /59   Pulse 52   Temp 97.9  F (36.6  C)   Resp 18   Ht 1.448 m (4' 9\")   Wt 51.7 kg (114 lb)   BMI 24.67 kg/m    A & O x 3, NAD. Lungs CTA, non labored. RRR, S1/S2 w/o murmur,gallop or rub.  edema.  Abdomen soft, nontender, +BT'S. No focal neurological deficits.      Recent labs in [facility] reviewed by me today.     Assessment/Plan:  (R63.4) Weight loss  (primary encounter diagnosis)  Comment: Weight loss 2/2 failure to thrive.   Plan: Continue boost as ordered. Will discuss hospice with daughter and son.     (R62.7) Failure to thrive in adult  Comment: Acute, no longer has desire to eat. Aware of weight loss, refusing future weights.   Plan:   CBC, BMP, CMP and albumin next lab day.     (R35.0) Urinary frequency  Comment: New, urgency, frequency and incontinence.   Plan: " UA/UC    (F41.9) Anxiety  Comment: Progressively getting worse, difficult with carlos use.   Plan: Increase remeron 7.5 mg/HS    MED REC REQUIRED  Post Medication Reconciliation Status: patient was not discharged from an inpatient facility or TCU      Orders:  UA/UC  CMP, cbc, albumin BMP next lab day  Remeron 7.5 mg/HS  ** will discuss hospice with family next week at visit     Electronically signed by:

## 2023-09-22 NOTE — LETTER
9/22/2023        RE: Ben Martínez  C/o Torin Gifft  [address] [facility] [id number] [place] [address] GERIATRICS    Chief Complaint   Patient presents with    RECHECK     HPI:  Ben Martínez is a 89 year old  (1/14/1928), who is being seen today for an episodic care visit at: [facility] ([facility]) [[id number]]. Today's concern is: ***    Allergies, and PMH/PSH reviewed in [facility] today.  REVIEW OF SYSTEMS:  {ngohsu70:516817}    Objective:   There were no vitals taken for this visit.  {half-way physical exam :209316}    {fgslab:970673}    Assessment/Plan:  {FGS DX2:650003}    MED REC REQUIRED{TIP  Click the link below to document or use med rec list, use list to pull in response :515196}  Post Medication Reconciliation Status: {MED REC LIST:090009}      Orders:  {fgsorders:460107}  ***    Electronically signed by: Amber Villanueva CNA ***          Sincerely,        Alix Tarango NP

## 2023-10-11 NOTE — TELEPHONE ENCOUNTER
[facility] Geriatrics Triage Nurse Telephone Encounter    Provider: SYBIL Maurice CNP  Facility: [facility]  Facility Type:  AL    Caller: Lucina  Call Back Number: [phone]    Allergies:  No Known Allergies     Reason for call: Pt has a pressure injury to his coccyx measuring 2.5 x 2.0 cm. Samantha-wound is nonblanchable about 1 cm in radius. Pt is often non-compliant with cares. Pt does have [facility] care assisting with LE wounds. They have been updated and are assisting with coccyx wound as well.     Verbal Order/Direction given by Provider: Tylenol 650 mg PO TID and 650 mg PO PRN    Provider giving Order:  SYBIL Maurice CNP    Verbal Order given to: Lucina Almeida RN

## 2023-10-13 NOTE — LETTER
10/13/2023        RE: Ben Martínez  C/o Torin Gifft  [address] [facility] [address]        [facility] GERIATRICS    Chief Complaint   Patient presents with     RECHECK     Buttocks wound, anxiety, pain      HPI:  Ben Martínez is a 89 year old  (1/14/1928), who is being seen today for an episodic care visit at: [facility]) [[id number]]. Today's concern is: Patient in wheelchair upon visit, daughter and son at side. Appears in distress 2/2 to pain. Has new pressure sore on left buttocks from inability to adjustment weight. Discussed goals of care with family and patient, does not want any further hospitalizations or workups, not sure if he would like treatment for infections, and wants comfort and pain control. Hospice was discussed and patient eventually agreed it was best for him and his goals of care.     Transfers from wheelchair to bed using lift, has severe anxiety with transfers that makes it very difficult to perform cares. Without recent falls but feels he is a high fall risk. Family ok with anxiety medications to help make this easier for him.     Wound on left buttocks was assessed while patient was in bed. Large amount of drainage, small open area with red, non-blanchable surrounding skin. New dressing was placed over wound and patient was directed to offload weight in bed for at least 30 minutes.      Continues to lose weight, has no appetite. Remeron dose increased recently. Now less than 10#    All family questions were addressed and hospice eval for admission was given to staff. Writer provided contact information to family for any questions or concerns.     BP Readings from Last 3 Encounters:   10/13/23 111/59   09/22/23 111/59   08/31/23 (!) 145/76         Allergies, and PMH/PSH reviewed in EPIC today.  REVIEW OF SYSTEMS:  10 point ROS of systems including Constitutional, Eyes, Respiratory, Cardiovascular, Gastroenterology, Genitourinary, Integumentary,  "Musculoskeletal, Psychiatric were all negative except for pertinent positives noted in my HPI.    Objective:   /59   Pulse 52   Temp 97.9  F (36.6  C)   Resp 18   Ht 1.448 m (4' 9\")   Wt 52.1 kg (114 lb 12.8 oz)   SpO2 95%   BMI 24.84 kg/m    GENERAL APPEARANCE:  Alert, in no distress  ENT:  Mouth and posterior oropharynx normal, moist mucous membranes, hearing acuity [place]  EYES:  EOM, conjunctivae, lids, pupils and irises normal  NECK:  No adenopathy,masses or thyromegaly  RESP:  respiratory effort and palpation of chest normal, no respiratory distress, Lung sounds clear  CV:  Palpation and auscultation of heart done , rate and rhythm regular, no murmur, no rub or gallop, Edema none  ABDOMEN:  normal bowel sounds, soft, nontender, no hepatosplenomegaly or other masses  M/S:   Gait and station baseline, Digits and nails WNL  SKIN:  Inspection/Palpation of skin and subcutaneous tissue wound left buttocks. Stage 1 pressure ulcer. Sanguineous drainage.   NEURO: 2-12 in normal limits and at patient's baseline  PSYCH:  insight and judgement, memory intact , affect and mood normal      Recent labs in EPIC reviewed by me today.     Assessment/Plan:  1. Anxiety: Longstanding history of anxiety, worse with lift transfers and cares.   -Ativan 0.5 mg PO BID  -Continue Celexa 20 mg/day  -Hospice evaluation for admission   2. Wound of left buttock, initial encounter: New wound left buttocks 2/2 pressure.   -Encourage offloading weight 2x daily in bed   -Dilaudid 2 mg PO TID for pain control   -Hospice evaluation for admission   3. Pain: Pain left buttocks wound.   -Dilaudid 2 mg PO TID  -Senna 8.6 mg PO q HS for constipation   4. Failure to thrive in adult: Progressive weight loss. Remeron HS. Continue to encourage oral intake.   -Hospice evaluation for admission.    5. Parkinson's disease without dyskinesia, with fluctuating manifestations: Chronic, no change.   6. Moderate episode of recurrent major depressive " disorder (H): Longstanding history, loss spouse this spring, ok on Celexa 20 mg/day. No change.       ** Advanced Planning:  No further hospitalizations  Reduce pill burden  Ok to treat acute illnesses with abx.   DNR/DNI    MED REC REQUIRED  Post Medication Reconciliation Status: patient was not discharged from an inpatient facility or TCU      Orders:  Discontinue ASA  Discontinue lipitor   Dilaudid 2 mg PO TID  Ativan 0.5 mg PO BID  Discontinue miralax   Senna S 8.6 mg PO q HS  Hospice evaluation for admission      Total time with patient visit 90 minutes including discussions about the POC and care coordination with the patient and family (son and daughter).   Discussed medications with family. Reviewed current MAR. Started new medication, discussed side effects and use.   Watched transfer using lift. Discussed increased anxiety with family and staff.   Inspected left buttock wound. Performed dressing change and educated family and patient on offloading weight frequently throughout the day.   Questions and concerns about hospice answered.   Advanced planning discussed with family and patient.       Electronically signed by: Alix Tarango NP           Sincerely,        Alix Tarango NP

## 2023-10-13 NOTE — PROGRESS NOTES
[facility] GERIATRICS    Chief Complaint   Patient presents with    RECHECK     Buttocks wound, anxiety, pain      HPI:  Ben Martínez is a 89 year old  (1/14/1928), who is being seen today for an episodic care visit at: [facility]) [[id number]]. Today's concern is: Patient in wheelchair upon visit, daughter and son at side. Appears in distress 2/2 to pain. Has new pressure sore on left buttocks from inability to adjustment weight. Discussed goals of care with family and patient, does not want any further hospitalizations or workups, not sure if he would like treatment for infections, and wants comfort and pain control. Hospice was discussed and patient eventually agreed it was best for him and his goals of care.     Transfers from wheelchair to bed using lift, has severe anxiety with transfers that makes it very difficult to perform cares. Without recent falls but feels he is a high fall risk. Family ok with anxiety medications to help make this easier for him.     Wound on left buttocks was assessed while patient was in bed. Large amount of drainage, small open area with red, non-blanchable surrounding skin. New dressing was placed over wound and patient was directed to offload weight in bed for at least 30 minutes.      Continues to lose weight, has no appetite. Remeron dose increased recently. Now less than 10#    All family questions were addressed and hospice eval for admission was given to staff. Writer provided contact information to family for any questions or concerns.     BP Readings from Last 3 Encounters:   10/13/23 111/59   09/22/23 111/59   08/31/23 (!) 145/76         Allergies, and PMH/PSH reviewed in EPIC today.  REVIEW OF SYSTEMS:  10 point ROS of systems including Constitutional, Eyes, Respiratory, Cardiovascular, Gastroenterology, Genitourinary, Integumentary, Musculoskeletal, Psychiatric were all negative except for pertinent positives noted in my HPI.    Objective:   BP  "111/59   Pulse 52   Temp 97.9  F (36.6  C)   Resp 18   Ht 1.448 m (4' 9\")   Wt 52.1 kg (114 lb 12.8 oz)   SpO2 95%   BMI 24.84 kg/m    GENERAL APPEARANCE:  Alert, in no distress  ENT:  Mouth and posterior oropharynx normal, moist mucous membranes, hearing acuity [place]  EYES:  EOM, conjunctivae, lids, pupils and irises normal  NECK:  No adenopathy,masses or thyromegaly  RESP:  respiratory effort and palpation of chest normal, no respiratory distress, Lung sounds clear  CV:  Palpation and auscultation of heart done , rate and rhythm regular, no murmur, no rub or gallop, Edema none  ABDOMEN:  normal bowel sounds, soft, nontender, no hepatosplenomegaly or other masses  M/S:   Gait and station baseline, Digits and nails WNL  SKIN:  Inspection/Palpation of skin and subcutaneous tissue wound left buttocks. Stage 1 pressure ulcer. Sanguineous drainage.   NEURO: 2-12 in normal limits and at patient's baseline  PSYCH:  insight and judgement, memory intact , affect and mood normal      Recent labs in [facility] reviewed by me today.     Assessment/Plan:  1. Anxiety: Longstanding history of anxiety, worse with lift transfers and cares.   -Ativan 0.5 mg PO BID  -Continue Celexa 20 mg/day  -Hospice evaluation for admission   2. Wound of left buttock, initial encounter: New wound left buttocks 2/2 pressure.   -Encourage offloading weight 2x daily in bed   -Dilaudid 2 mg PO TID for pain control   -Hospice evaluation for admission   3. Pain: Pain left buttocks wound.   -Dilaudid 2 mg PO TID  -Senna 8.6 mg PO q HS for constipation   4. Failure to thrive in adult: Progressive weight loss. Remeron HS. Continue to encourage oral intake.   -Hospice evaluation for admission.    5. Parkinson's disease without dyskinesia, with fluctuating manifestations: Chronic, no change.   6. Moderate episode of recurrent major depressive disorder (H): Longstanding history, loss spouse this spring, ok on Celexa 20 mg/day. No change.       ** Advanced " Planning:  No further hospitalizations  Reduce pill burden  Ok to treat acute illnesses with abx.   DNR/DNI    MED REC REQUIRED  Post Medication Reconciliation Status: patient was not discharged from an inpatient facility or TCU      Orders:  Discontinue ASA  Discontinue lipitor   Dilaudid 2 mg PO TID  Ativan 0.5 mg PO BID  Discontinue miralax   Senna S 8.6 mg PO q HS  Hospice evaluation for admission      Total time with patient visit 90 minutes including discussions about the POC and care coordination with the patient and family (son and daughter).   Discussed medications with family. Reviewed current MAR. Started new medication, discussed side effects and use.   Watched transfer using lift. Discussed increased anxiety with family and staff.   Inspected left buttock wound. Performed dressing change and educated family and patient on offloading weight frequently throughout the day.   Questions and concerns about hospice answered.   Advanced planning discussed with family and patient.       Electronically signed by: Alix Tarango NP

## 2023-10-13 NOTE — TELEPHONE ENCOUNTER
"Good afternoon,    We received two ERXs for Ben for Ativan and Hydromorphone. The problem is, the last name here in Epic and on the ERXs is \"GRIFFT\" but his actual last name is \"GIFFT\". We can not use those ERXS that have the wrong last name on them for controlled medications. Please fix their name and re send us the ERXs for Dilaudid and Ativan. Also, the ativan will require a prior authorization. If you have any questions, please reach out to the pharmacy. Thank you.    Nicole Younger, [facility]  [address] [place] [id number]  [email or web address]  Pharmacy: [phone] Fax: [phone]  "

## 2023-10-14 NOTE — TELEPHONE ENCOUNTER
Prior Authorization Retail Medication Request        Pharmacy Information (if different than what is on RX)  Name: [facility]  Phone: [phone]

## 2023-10-27 NOTE — LETTER
"    10/27/2023        RE: Ben Salvador  C/o Torin Salvador  [address] [facility] [address]        [facility] GERIATRICS    Chief Complaint   Patient presents with     RECHECK     HPI:  Ben Salvador is a 89 year old  (1/14/1928), who is being seen today for an episodic care visit at: [facility] ([facility]) [[id number]]. Today's concern is: Patient resting in bed upon exam. RN accompanied writer to assess anxiety level, sleeping and unable to arouse. Appears comfortable and without anxiety or pain. Little to no oral intake over several days. Enrolled with [facility] with goals of care directed at pain control and comfort. 24/7 hour care has been provided by family. No further concerns at this time.     BP Readings from Last 3 Encounters:   10/13/23 111/59   09/22/23 111/59   08/31/23 (!) 145/76     Pulse Readings from Last 4 Encounters:   10/13/23 52   09/22/23 52   08/31/23 96   04/26/23 (!) 44     Wt Readings from Last 4 Encounters:   10/27/23 51.7 kg (114 lb)   10/13/23 52.1 kg (114 lb 12.8 oz)   09/22/23 51.7 kg (114 lb)   08/31/23 52.1 kg (114 lb 12.8 oz)       Allergies, and PMH/PSH reviewed in EPIC today.  REVIEW OF SYSTEMS:  Patient sleeping and non-verbal     Objective:   Ht 1.448 m (4' 9\")   Wt 51.7 kg (114 lb)   BMI 24.67 kg/m    A & O x 3, NAD. Lungs CTA, non labored. RRR, S1/S2 w/o murmur,gallop or rub.  no edema.  Abdomen soft, nontender, +BT'S. No focal neurological deficits.        Recent labs in [facility] reviewed by me today.     Assessment/Plan:  1. Failure to thrive in adult: ongoing, little to no intake. Enrolled with hospice. Continue with plan of care no changes at this time, adjustment as needed     2. Hospice care patient: Enrolled with [facility], POC focused on pain control and comfort.    3. Anxiety: managed on ativan at this time. No change       MED REC REQUIRED  Post Medication Reconciliation Status: patient was not discharged from an inpatient facility " or TCU      Orders:  No new orders    Electronically signed by: Alix Tarango NP          Sincerely,        Alix Tarango, NP